# Patient Record
Sex: FEMALE | Race: WHITE | NOT HISPANIC OR LATINO | Employment: OTHER | URBAN - METROPOLITAN AREA
[De-identification: names, ages, dates, MRNs, and addresses within clinical notes are randomized per-mention and may not be internally consistent; named-entity substitution may affect disease eponyms.]

---

## 2017-08-17 ENCOUNTER — HOSPITAL ENCOUNTER (OUTPATIENT)
Dept: ORTHOPEDIC SURGERY | Facility: CLINIC | Age: 62
Discharge: HOME | End: 2017-08-17
Attending: ORTHOPAEDIC SURGERY | Admitting: ORTHOPAEDIC SURGERY

## 2017-08-23 ENCOUNTER — HOSPITAL ENCOUNTER (OUTPATIENT)
Dept: PHYSICAL THERAPY | Facility: HOSPITAL | Age: 62
Setting detail: RECURRING SERIES
Discharge: HOME | End: 2017-10-18
Attending: ORTHOPAEDIC SURGERY | Admitting: ORTHOPAEDIC SURGERY

## 2017-10-31 ENCOUNTER — HOSPITAL ENCOUNTER (OUTPATIENT)
Dept: PREADMISSION TESTING | Facility: HOSPITAL | Age: 62
Discharge: HOME | End: 2017-10-31
Attending: ORTHOPAEDIC SURGERY | Admitting: ORTHOPAEDIC SURGERY

## 2017-10-31 LAB
ABS VARIANT LYMPHS: 0.36 10*3/UL
ANION GAP SERPL CALC-SCNC: 13.2 MMOL/L (ref 10–20)
APTT BLD: 25.6 SEC (ref 24–31)
BACTERIA SPEC AEROBE CULT: NORMAL
BUN SERPL-MCNC: 15 MG/DL (ref 8–20)
BUN/CREAT SERPL: 13.6 (ref 5.4–26.2)
CALCIUM SERPL-MCNC: 9.1 MG/DL (ref 8.9–10.3)
CHLORIDE SERPL-SCNC: 93 MMOL/L (ref 101–111)
CONV ABS IMM GRAN: 0.12 10*3/UL
CONV ANISOCYTES: (no result)
CONV CO2: 31 MMOL/L (ref 22–32)
CONV MICROCYTES IN BLOOD BY LIGHT MICROSCOPY: (no result)
CONV PLATELETS GIANT IN BLOOD BY LIGHT MICROSCOPY: (no result)
CONV POIKILOCYTOSIS IN BLOOD BY LIGHT MICROSCOPY: SLIGHT
CONV POLYCHROMASIA IN BLOOD BY LIGHT MICROSCOPY: SLIGHT
CONV VARIANT LYMPHOCYTES RELATIVE PERCENT BY MANUAL COUNT: 3 % (ref 0–1)
CREAT UR-MCNC: 1.1 MG/DL (ref 0.4–1)
DIFFERENTIAL METHOD BLD: (no result)
ERYTHROCYTE [DISTWIDTH] IN BLOOD BY AUTOMATED COUNT: 20.5 % (ref 11.5–14.5)
GLUCOSE SERPL-MCNC: 76 MG/DL (ref 65–99)
HCT VFR BLD AUTO: 26.1 % (ref 35–49)
HGB BLD-MCNC: 7.5 G/DL (ref 12–15)
INR PPP: 1
LYMPHOCYTES # BLD AUTO: 2.3 10*3/UL (ref 0.8–4.8)
LYMPHOCYTES NFR BLD AUTO: 19 % (ref 18–42)
Lab: NORMAL
MCH RBC QN AUTO: 17.2 PG (ref 26–32)
MCHC RBC AUTO-ENTMCNC: (no result) G/DL (ref 32–36)
MCV RBC AUTO: (no result) FL (ref 80–94)
MICRO REPORT STATUS: NORMAL
MONOCYTES # BLD AUTO: 0.6 10*3/UL (ref 0.1–1.3)
MONOCYTES NFR BLD AUTO: 5 % (ref 2–11)
MYELOCYTES NFR BLD MANUAL: 1 %
NEUTROPHILS # BLD AUTO: 8.5 10*3/UL (ref 2.3–8.6)
NEUTROPHILS NFR BLD AUTO: 72 % (ref 50–75)
NRBC BLD AUTO-RTO: 1 /100{WBCS}
PATHOLOGIST REVIEW: (no result)
PLATELET # BLD AUTO: 340 10*3/UL (ref 150–450)
PMV BLD AUTO: 8.5 FL (ref 7.4–10.4)
POTASSIUM SERPL-SCNC: 3.2 MMOL/L (ref 3.6–5.1)
PROTHROMBIN TIME: 10.7 SEC (ref 9.6–11.7)
RBC # BLD AUTO: 4.36 10*6/UL (ref 4–5.4)
SODIUM SERPL-SCNC: 134 MMOL/L (ref 136–144)
SPECIMEN SOURCE: NORMAL
WBC # BLD AUTO: 11.9 10*3/UL (ref 4.5–11.5)

## 2017-11-07 ENCOUNTER — HOSPITAL ENCOUNTER (OUTPATIENT)
Dept: PREOP | Facility: HOSPITAL | Age: 62
Setting detail: HOSPITAL OUTPATIENT SURGERY
Discharge: HOME | End: 2017-11-07
Attending: ORTHOPAEDIC SURGERY | Admitting: ORTHOPAEDIC SURGERY

## 2017-11-07 LAB
ABO + RH BLD: NORMAL
ARMBAND: NORMAL
BLD COMPONENT TYPE: NORMAL
BLD GP AB SCN SERPL QL: NEGATIVE
CROSSMATCH EXPIRATION: NORMAL
HCT VFR BLD AUTO: 25.6 % (ref 35–49)
HGB BLD-MCNC: 7.4 G/DL (ref 12–15)
POTASSIUM SERPL-SCNC: 3.3 MMOL/L (ref 3.6–5.1)

## 2018-01-09 ENCOUNTER — OFFICE (AMBULATORY)
Dept: URBAN - METROPOLITAN AREA CLINIC 64 | Facility: CLINIC | Age: 63
End: 2018-01-09
Payer: MEDICAID

## 2018-01-09 VITALS
HEIGHT: 65 IN | HEART RATE: 108 BPM | DIASTOLIC BLOOD PRESSURE: 75 MMHG | SYSTOLIC BLOOD PRESSURE: 139 MMHG | WEIGHT: 192 LBS

## 2018-01-09 DIAGNOSIS — T40.2X5A ADVERSE EFFECT OF OTHER OPIOIDS, INITIAL ENCOUNTER: ICD-10-CM

## 2018-01-09 DIAGNOSIS — K62.5 HEMORRHAGE OF ANUS AND RECTUM: ICD-10-CM

## 2018-01-09 DIAGNOSIS — D50.0 IRON DEFICIENCY ANEMIA SECONDARY TO BLOOD LOSS (CHRONIC): ICD-10-CM

## 2018-01-09 DIAGNOSIS — K25.9 GASTRIC ULCER, UNSPECIFIED AS ACUTE OR CHRONIC, WITHOUT HEMO: ICD-10-CM

## 2018-01-09 LAB
CBC WITH DIFFERENTIAL/PLATELET: BASO (ABSOLUTE): 0 X10E3/UL (ref 0–0.2)
CBC WITH DIFFERENTIAL/PLATELET: BASOS: 0 %
CBC WITH DIFFERENTIAL/PLATELET: EOS (ABSOLUTE): 0.3 X10E3/UL (ref 0–0.4)
CBC WITH DIFFERENTIAL/PLATELET: EOS: 2 %
CBC WITH DIFFERENTIAL/PLATELET: HEMATOCRIT: 28.2 % — LOW (ref 34–46.6)
CBC WITH DIFFERENTIAL/PLATELET: HEMATOLOGY COMMENTS: (no result)
CBC WITH DIFFERENTIAL/PLATELET: HEMOGLOBIN: 8.6 G/DL — LOW (ref 11.1–15.9)
CBC WITH DIFFERENTIAL/PLATELET: IMMATURE CELLS: (no result)
CBC WITH DIFFERENTIAL/PLATELET: IMMATURE GRANS (ABS): 0 X10E3/UL (ref 0–0.1)
CBC WITH DIFFERENTIAL/PLATELET: IMMATURE GRANULOCYTES: 0 %
CBC WITH DIFFERENTIAL/PLATELET: LYMPHS (ABSOLUTE): 3 X10E3/UL (ref 0.7–3.1)
CBC WITH DIFFERENTIAL/PLATELET: LYMPHS: 18 %
CBC WITH DIFFERENTIAL/PLATELET: MCH: 19.5 PG — LOW (ref 26.6–33)
CBC WITH DIFFERENTIAL/PLATELET: MCHC: 30.5 G/DL — LOW (ref 31.5–35.7)
CBC WITH DIFFERENTIAL/PLATELET: MCV: 64 FL — LOW (ref 79–97)
CBC WITH DIFFERENTIAL/PLATELET: MONOCYTES(ABSOLUTE): 0.9 X10E3/UL (ref 0.1–0.9)
CBC WITH DIFFERENTIAL/PLATELET: MONOCYTES: 5 %
CBC WITH DIFFERENTIAL/PLATELET: NEUTROPHILS (ABSOLUTE): 12 X10E3/UL — HIGH (ref 1.4–7)
CBC WITH DIFFERENTIAL/PLATELET: NEUTROPHILS: 75 %
CBC WITH DIFFERENTIAL/PLATELET: NRBC: (no result)
CBC WITH DIFFERENTIAL/PLATELET: PLATELETS: 335 X10E3/UL (ref 150–379)
CBC WITH DIFFERENTIAL/PLATELET: RBC: 4.42 X10E6/UL (ref 3.77–5.28)
CBC WITH DIFFERENTIAL/PLATELET: RDW: 20.7 % — HIGH (ref 12.3–15.4)
CBC WITH DIFFERENTIAL/PLATELET: WBC: 16.2 X10E3/UL — HIGH (ref 3.4–10.8)
FERRITIN, SERUM: 8 NG/ML — LOW (ref 15–150)
IRON AND TIBC: IRON BIND.CAP.(TIBC): 493 UG/DL — HIGH (ref 250–450)
IRON AND TIBC: IRON SATURATION: 4 % — CRITICAL LOW (ref 15–55)
IRON AND TIBC: IRON, SERUM: 18 UG/DL — LOW (ref 27–159)
IRON AND TIBC: UIBC: 475 UG/DL — HIGH (ref 131–425)

## 2018-01-09 PROCEDURE — 99203 OFFICE O/P NEW LOW 30 MIN: CPT | Performed by: INTERNAL MEDICINE

## 2018-01-10 ENCOUNTER — HOSPITAL ENCOUNTER (OUTPATIENT)
Dept: OTHER | Facility: HOSPITAL | Age: 63
Setting detail: SPECIMEN
Discharge: HOME | End: 2018-01-10
Attending: INTERNAL MEDICINE | Admitting: INTERNAL MEDICINE

## 2018-01-10 ENCOUNTER — ON CAMPUS - OUTPATIENT (AMBULATORY)
Dept: URBAN - METROPOLITAN AREA HOSPITAL 2 | Facility: HOSPITAL | Age: 63
End: 2018-01-10
Payer: MEDICAID

## 2018-01-10 ENCOUNTER — OFFICE (AMBULATORY)
Dept: URBAN - METROPOLITAN AREA PATHOLOGY 4 | Facility: PATHOLOGY | Age: 63
End: 2018-01-10
Payer: MEDICAID

## 2018-01-10 VITALS
HEART RATE: 76 BPM | RESPIRATION RATE: 15 BRPM | DIASTOLIC BLOOD PRESSURE: 54 MMHG | DIASTOLIC BLOOD PRESSURE: 65 MMHG | WEIGHT: 184 LBS | HEART RATE: 81 BPM | DIASTOLIC BLOOD PRESSURE: 67 MMHG | HEART RATE: 74 BPM | HEART RATE: 85 BPM | HEART RATE: 83 BPM | OXYGEN SATURATION: 98 % | DIASTOLIC BLOOD PRESSURE: 74 MMHG | SYSTOLIC BLOOD PRESSURE: 103 MMHG | HEART RATE: 73 BPM | SYSTOLIC BLOOD PRESSURE: 125 MMHG | SYSTOLIC BLOOD PRESSURE: 96 MMHG | OXYGEN SATURATION: 100 % | DIASTOLIC BLOOD PRESSURE: 64 MMHG | HEART RATE: 105 BPM | SYSTOLIC BLOOD PRESSURE: 78 MMHG | DIASTOLIC BLOOD PRESSURE: 60 MMHG | HEART RATE: 75 BPM | SYSTOLIC BLOOD PRESSURE: 115 MMHG | SYSTOLIC BLOOD PRESSURE: 87 MMHG | DIASTOLIC BLOOD PRESSURE: 111 MMHG | TEMPERATURE: 97.7 F | DIASTOLIC BLOOD PRESSURE: 55 MMHG | SYSTOLIC BLOOD PRESSURE: 132 MMHG | RESPIRATION RATE: 18 BRPM | OXYGEN SATURATION: 95 % | OXYGEN SATURATION: 99 % | HEIGHT: 65 IN | HEART RATE: 71 BPM | RESPIRATION RATE: 16 BRPM | HEART RATE: 77 BPM | SYSTOLIC BLOOD PRESSURE: 98 MMHG | DIASTOLIC BLOOD PRESSURE: 53 MMHG | OXYGEN SATURATION: 97 % | SYSTOLIC BLOOD PRESSURE: 95 MMHG | DIASTOLIC BLOOD PRESSURE: 62 MMHG | DIASTOLIC BLOOD PRESSURE: 52 MMHG | OXYGEN SATURATION: 94 % | SYSTOLIC BLOOD PRESSURE: 107 MMHG

## 2018-01-10 DIAGNOSIS — K62.5 HEMORRHAGE OF ANUS AND RECTUM: ICD-10-CM

## 2018-01-10 DIAGNOSIS — K25.9 GASTRIC ULCER, UNSPECIFIED AS ACUTE OR CHRONIC, WITHOUT HEMO: ICD-10-CM

## 2018-01-10 DIAGNOSIS — K62.1 RECTAL POLYP: ICD-10-CM

## 2018-01-10 DIAGNOSIS — K31.819 ANGIODYSPLASIA OF STOMACH AND DUODENUM WITHOUT BLEEDING: ICD-10-CM

## 2018-01-10 DIAGNOSIS — K31.89 OTHER DISEASES OF STOMACH AND DUODENUM: ICD-10-CM

## 2018-01-10 DIAGNOSIS — K29.70 GASTRITIS, UNSPECIFIED, WITHOUT BLEEDING: ICD-10-CM

## 2018-01-10 DIAGNOSIS — D50.0 IRON DEFICIENCY ANEMIA SECONDARY TO BLOOD LOSS (CHRONIC): ICD-10-CM

## 2018-01-10 PROBLEM — Z48.815 ENCNTR FOR SURGICAL AFTCR FOLLOWING SURGERY ON THE DGSTV SYS: Status: ACTIVE | Noted: 2018-01-10

## 2018-01-10 LAB
GI HISTOLOGY: A. SELECT: (no result)
GI HISTOLOGY: B. UNSPECIFIED: (no result)
GI HISTOLOGY: PDF REPORT: (no result)

## 2018-01-10 PROCEDURE — 45380 COLONOSCOPY AND BIOPSY: CPT | Performed by: INTERNAL MEDICINE

## 2018-01-10 PROCEDURE — 43239 EGD BIOPSY SINGLE/MULTIPLE: CPT | Mod: 59 | Performed by: INTERNAL MEDICINE

## 2018-01-10 PROCEDURE — 88305 TISSUE EXAM BY PATHOLOGIST: CPT | Mod: 26 | Performed by: INTERNAL MEDICINE

## 2018-01-10 PROCEDURE — 43270 EGD LESION ABLATION: CPT | Performed by: INTERNAL MEDICINE

## 2018-01-10 RX ADMIN — PROPOFOL: 10 INJECTION, EMULSION INTRAVENOUS at 15:03

## 2018-01-24 ENCOUNTER — OFFICE (AMBULATORY)
Dept: URBAN - METROPOLITAN AREA INFUSION 3 | Facility: INFUSION | Age: 63
End: 2018-01-24
Payer: MEDICAID

## 2018-01-24 VITALS
RESPIRATION RATE: 18 BRPM | HEART RATE: 77 BPM | HEIGHT: 65 IN | SYSTOLIC BLOOD PRESSURE: 136 MMHG | TEMPERATURE: 96.9 F | HEART RATE: 76 BPM | TEMPERATURE: 96.8 F | SYSTOLIC BLOOD PRESSURE: 110 MMHG | DIASTOLIC BLOOD PRESSURE: 74 MMHG | DIASTOLIC BLOOD PRESSURE: 70 MMHG

## 2018-01-24 DIAGNOSIS — D50.0 IRON DEFICIENCY ANEMIA SECONDARY TO BLOOD LOSS (CHRONIC): ICD-10-CM

## 2018-01-24 DIAGNOSIS — K90.9 INTESTINAL MALABSORPTION, UNSPECIFIED: ICD-10-CM

## 2018-01-24 PROCEDURE — 96365 THER/PROPH/DIAG IV INF INIT: CPT | Performed by: INTERNAL MEDICINE

## 2018-01-31 ENCOUNTER — OFFICE (AMBULATORY)
Dept: URBAN - METROPOLITAN AREA INFUSION 3 | Facility: INFUSION | Age: 63
End: 2018-01-31
Payer: MEDICAID

## 2018-01-31 VITALS
TEMPERATURE: 97.6 F | TEMPERATURE: 96.9 F | DIASTOLIC BLOOD PRESSURE: 69 MMHG | HEIGHT: 65 IN | HEART RATE: 82 BPM | HEART RATE: 85 BPM | RESPIRATION RATE: 18 BRPM | DIASTOLIC BLOOD PRESSURE: 51 MMHG | SYSTOLIC BLOOD PRESSURE: 96 MMHG | SYSTOLIC BLOOD PRESSURE: 102 MMHG

## 2018-01-31 DIAGNOSIS — D50.0 IRON DEFICIENCY ANEMIA SECONDARY TO BLOOD LOSS (CHRONIC): ICD-10-CM

## 2018-01-31 DIAGNOSIS — K90.9 INTESTINAL MALABSORPTION, UNSPECIFIED: ICD-10-CM

## 2018-01-31 PROCEDURE — 96365 THER/PROPH/DIAG IV INF INIT: CPT | Performed by: INTERNAL MEDICINE

## 2018-10-02 ENCOUNTER — OFFICE (AMBULATORY)
Dept: URBAN - METROPOLITAN AREA CLINIC 64 | Facility: CLINIC | Age: 63
End: 2018-10-02

## 2018-10-02 VITALS
WEIGHT: 188 LBS | HEART RATE: 90 BPM | HEIGHT: 65 IN | DIASTOLIC BLOOD PRESSURE: 91 MMHG | SYSTOLIC BLOOD PRESSURE: 156 MMHG

## 2018-10-02 DIAGNOSIS — D50.9 IRON DEFICIENCY ANEMIA, UNSPECIFIED: ICD-10-CM

## 2018-10-02 PROCEDURE — 99213 OFFICE O/P EST LOW 20 MIN: CPT | Performed by: NURSE PRACTITIONER

## 2018-10-23 ENCOUNTER — OFFICE (AMBULATORY)
Dept: URBAN - METROPOLITAN AREA CLINIC 64 | Facility: CLINIC | Age: 63
End: 2018-10-23

## 2018-10-23 VITALS
DIASTOLIC BLOOD PRESSURE: 66 MMHG | WEIGHT: 188 LBS | HEART RATE: 88 BPM | SYSTOLIC BLOOD PRESSURE: 118 MMHG | HEIGHT: 65 IN

## 2018-10-23 DIAGNOSIS — D50.9 IRON DEFICIENCY ANEMIA, UNSPECIFIED: ICD-10-CM

## 2018-10-23 PROCEDURE — 99213 OFFICE O/P EST LOW 20 MIN: CPT | Performed by: NURSE PRACTITIONER

## 2018-10-29 ENCOUNTER — OFFICE (AMBULATORY)
Dept: URBAN - METROPOLITAN AREA CLINIC 64 | Facility: CLINIC | Age: 63
End: 2018-10-29

## 2018-10-29 DIAGNOSIS — D50.0 IRON DEFICIENCY ANEMIA SECONDARY TO BLOOD LOSS (CHRONIC): ICD-10-CM

## 2018-10-29 DIAGNOSIS — K55.20 ANGIODYSPLASIA OF COLON WITHOUT HEMORRHAGE: ICD-10-CM

## 2018-10-29 DIAGNOSIS — Z98.890 OTHER SPECIFIED POSTPROCEDURAL STATES: ICD-10-CM

## 2018-10-29 PROCEDURE — 91110 GI TRC IMG INTRAL ESOPH-ILE: CPT | Performed by: INTERNAL MEDICINE

## 2018-12-07 ENCOUNTER — OFFICE (AMBULATORY)
Dept: URBAN - METROPOLITAN AREA CLINIC 64 | Facility: CLINIC | Age: 63
End: 2018-12-07

## 2018-12-07 VITALS
HEIGHT: 65 IN | WEIGHT: 187 LBS | SYSTOLIC BLOOD PRESSURE: 110 MMHG | HEART RATE: 88 BPM | DIASTOLIC BLOOD PRESSURE: 66 MMHG

## 2018-12-07 DIAGNOSIS — D50.0 IRON DEFICIENCY ANEMIA SECONDARY TO BLOOD LOSS (CHRONIC): ICD-10-CM

## 2018-12-07 DIAGNOSIS — K92.1 MELENA: ICD-10-CM

## 2018-12-07 PROCEDURE — 99213 OFFICE O/P EST LOW 20 MIN: CPT | Performed by: NURSE PRACTITIONER

## 2018-12-12 ENCOUNTER — ON CAMPUS - OUTPATIENT (AMBULATORY)
Dept: URBAN - METROPOLITAN AREA HOSPITAL 2 | Facility: HOSPITAL | Age: 63
End: 2018-12-12

## 2018-12-12 VITALS
OXYGEN SATURATION: 96 % | SYSTOLIC BLOOD PRESSURE: 121 MMHG | WEIGHT: 184 LBS | DIASTOLIC BLOOD PRESSURE: 59 MMHG | OXYGEN SATURATION: 100 % | HEIGHT: 65 IN | HEART RATE: 105 BPM | SYSTOLIC BLOOD PRESSURE: 113 MMHG | HEART RATE: 81 BPM | SYSTOLIC BLOOD PRESSURE: 97 MMHG | SYSTOLIC BLOOD PRESSURE: 165 MMHG | OXYGEN SATURATION: 99 % | OXYGEN SATURATION: 98 % | HEART RATE: 85 BPM | SYSTOLIC BLOOD PRESSURE: 133 MMHG | DIASTOLIC BLOOD PRESSURE: 87 MMHG | HEART RATE: 80 BPM | HEART RATE: 84 BPM | TEMPERATURE: 98 F | DIASTOLIC BLOOD PRESSURE: 61 MMHG | RESPIRATION RATE: 18 BRPM | SYSTOLIC BLOOD PRESSURE: 122 MMHG | OXYGEN SATURATION: 97 % | DIASTOLIC BLOOD PRESSURE: 65 MMHG | DIASTOLIC BLOOD PRESSURE: 53 MMHG | OXYGEN SATURATION: 95 % | DIASTOLIC BLOOD PRESSURE: 63 MMHG | SYSTOLIC BLOOD PRESSURE: 102 MMHG | DIASTOLIC BLOOD PRESSURE: 56 MMHG | RESPIRATION RATE: 12 BRPM | RESPIRATION RATE: 16 BRPM | HEART RATE: 86 BPM | DIASTOLIC BLOOD PRESSURE: 74 MMHG | SYSTOLIC BLOOD PRESSURE: 120 MMHG

## 2018-12-12 DIAGNOSIS — K31.819 ANGIODYSPLASIA OF STOMACH AND DUODENUM WITHOUT BLEEDING: ICD-10-CM

## 2018-12-12 DIAGNOSIS — D50.0 IRON DEFICIENCY ANEMIA SECONDARY TO BLOOD LOSS (CHRONIC): ICD-10-CM

## 2018-12-12 PROCEDURE — 44369 SMALL BOWEL ENDOSCOPY: CPT | Performed by: INTERNAL MEDICINE

## 2018-12-12 RX ADMIN — GLUCAGON 0.5 MG: KIT at 16:17

## 2019-01-01 ENCOUNTER — OFFICE VISIT (OUTPATIENT)
Dept: SURGERY | Facility: CLINIC | Age: 64
End: 2019-01-01

## 2019-01-01 ENCOUNTER — OFFICE VISIT (OUTPATIENT)
Dept: FAMILY MEDICINE CLINIC | Facility: CLINIC | Age: 64
End: 2019-01-01

## 2019-01-01 ENCOUNTER — TELEPHONE (OUTPATIENT)
Dept: FAMILY MEDICINE CLINIC | Facility: CLINIC | Age: 64
End: 2019-01-01

## 2019-01-01 ENCOUNTER — HOSPITAL ENCOUNTER (OUTPATIENT)
Dept: RADIATION ONCOLOGY | Facility: HOSPITAL | Age: 64
Discharge: HOME | End: 2019-10-25

## 2019-01-01 ENCOUNTER — HOSPITAL ENCOUNTER (OUTPATIENT)
Dept: CT IMAGING | Facility: HOSPITAL | Age: 64
Discharge: HOME | End: 2019-08-26
Admitting: ORTHOPAEDIC SURGERY

## 2019-01-01 ENCOUNTER — EPISODE CHANGES (OUTPATIENT)
Dept: CASE MANAGEMENT | Facility: OTHER | Age: 64
End: 2019-01-01

## 2019-01-01 ENCOUNTER — LAB REQUISITION (OUTPATIENT)
Dept: LAB | Facility: HOSPITAL | Age: 64
End: 2019-01-01

## 2019-01-01 ENCOUNTER — HOSPITAL ENCOUNTER (OUTPATIENT)
Dept: RADIATION ONCOLOGY | Facility: HOSPITAL | Age: 64
Discharge: HOME | End: 2019-10-22

## 2019-01-01 ENCOUNTER — TELEPHONE (OUTPATIENT)
Dept: ONCOLOGY | Facility: CLINIC | Age: 64
End: 2019-01-01

## 2019-01-01 ENCOUNTER — HOSPITAL ENCOUNTER (OUTPATIENT)
Dept: NUCLEAR MEDICINE | Facility: HOSPITAL | Age: 64
Discharge: HOME | End: 2019-09-18

## 2019-01-01 ENCOUNTER — PREP FOR SURGERY (OUTPATIENT)
Dept: OTHER | Facility: HOSPITAL | Age: 64
End: 2019-01-01

## 2019-01-01 ENCOUNTER — HOSPITAL ENCOUNTER (OUTPATIENT)
Dept: RADIATION ONCOLOGY | Facility: HOSPITAL | Age: 64
Discharge: HOME | End: 2019-10-28

## 2019-01-01 ENCOUNTER — OFFICE VISIT (OUTPATIENT)
Dept: CARDIOLOGY | Facility: CLINIC | Age: 64
End: 2019-01-01

## 2019-01-01 ENCOUNTER — HOSPITAL ENCOUNTER (OUTPATIENT)
Dept: RADIATION ONCOLOGY | Facility: HOSPITAL | Age: 64
Discharge: HOME | End: 2019-10-23

## 2019-01-01 ENCOUNTER — READMISSION MANAGEMENT (OUTPATIENT)
Dept: CALL CENTER | Facility: HOSPITAL | Age: 64
End: 2019-01-01

## 2019-01-01 ENCOUNTER — TELEPHONE (OUTPATIENT)
Dept: ORTHOPEDIC SURGERY | Facility: CLINIC | Age: 64
End: 2019-01-01

## 2019-01-01 ENCOUNTER — HOSPITAL ENCOUNTER (OUTPATIENT)
Dept: RADIATION ONCOLOGY | Facility: HOSPITAL | Age: 64
Discharge: HOME | End: 2019-12-24

## 2019-01-01 ENCOUNTER — OFFICE VISIT (OUTPATIENT)
Dept: ORTHOPEDIC SURGERY | Facility: CLINIC | Age: 64
End: 2019-01-01

## 2019-01-01 ENCOUNTER — HOSPITAL ENCOUNTER (OUTPATIENT)
Dept: INFUSION THERAPY | Facility: HOSPITAL | Age: 64
Discharge: HOME | End: 2019-06-25
Admitting: NURSE PRACTITIONER

## 2019-01-01 ENCOUNTER — HOSPITAL ENCOUNTER (OUTPATIENT)
Dept: CARDIOLOGY | Facility: HOSPITAL | Age: 64
Discharge: HOME | End: 2019-09-23
Admitting: INTERNAL MEDICINE

## 2019-01-01 ENCOUNTER — HOSPITAL ENCOUNTER (OUTPATIENT)
Dept: INFUSION THERAPY | Facility: HOSPITAL | Age: 64
Discharge: HOME | End: 2019-07-02
Admitting: NURSE PRACTITIONER

## 2019-01-01 ENCOUNTER — ANESTHESIA (OUTPATIENT)
Dept: PERIOP | Facility: HOSPITAL | Age: 64
End: 2019-01-01

## 2019-01-01 ENCOUNTER — HOSPITAL ENCOUNTER (OUTPATIENT)
Dept: RADIATION ONCOLOGY | Facility: HOSPITAL | Age: 64
Discharge: HOME | End: 2019-12-27

## 2019-01-01 ENCOUNTER — HOSPITAL ENCOUNTER (OUTPATIENT)
Dept: RADIATION ONCOLOGY | Facility: HOSPITAL | Age: 64
Setting detail: RADIATION/ONCOLOGY SERIES
End: 2019-01-01

## 2019-01-01 ENCOUNTER — TELEPHONE (OUTPATIENT)
Dept: CARDIOLOGY | Facility: CLINIC | Age: 64
End: 2019-01-01

## 2019-01-01 ENCOUNTER — HOSPITAL ENCOUNTER (OUTPATIENT)
Dept: CARDIOLOGY | Facility: HOSPITAL | Age: 64
Discharge: HOME | End: 2019-12-17
Admitting: NURSE PRACTITIONER

## 2019-01-01 ENCOUNTER — TELEPHONE (OUTPATIENT)
Dept: PET IMAGING | Facility: HOSPITAL | Age: 64
End: 2019-01-01

## 2019-01-01 ENCOUNTER — TRANSCRIBE ORDERS (OUTPATIENT)
Dept: ADMINISTRATIVE | Facility: HOSPITAL | Age: 64
End: 2019-01-01

## 2019-01-01 ENCOUNTER — TRANSITIONAL CARE MANAGEMENT TELEPHONE ENCOUNTER (OUTPATIENT)
Dept: FAMILY MEDICINE CLINIC | Facility: CLINIC | Age: 64
End: 2019-01-01

## 2019-01-01 ENCOUNTER — TELEPHONE (OUTPATIENT)
Dept: RADIATION ONCOLOGY | Facility: HOSPITAL | Age: 64
End: 2019-01-01

## 2019-01-01 ENCOUNTER — ANESTHESIA (OUTPATIENT)
Dept: GASTROENTEROLOGY | Facility: HOSPITAL | Age: 64
End: 2019-01-01

## 2019-01-01 ENCOUNTER — HOSPITAL ENCOUNTER (OUTPATIENT)
Dept: INFUSION THERAPY | Facility: HOSPITAL | Age: 64
Discharge: HOME | End: 2019-06-28
Admitting: FAMILY MEDICINE

## 2019-01-01 ENCOUNTER — HOSPITAL ENCOUNTER (OUTPATIENT)
Dept: GENERAL RADIOLOGY | Facility: HOSPITAL | Age: 64
Discharge: HOME | End: 2019-12-02

## 2019-01-01 ENCOUNTER — HOSPITAL ENCOUNTER (OUTPATIENT)
Facility: HOSPITAL | Age: 64
Setting detail: HOSPITAL OUTPATIENT SURGERY
Discharge: HOME | End: 2019-10-04
Attending: THORACIC SURGERY (CARDIOTHORACIC VASCULAR SURGERY) | Admitting: THORACIC SURGERY (CARDIOTHORACIC VASCULAR SURGERY)

## 2019-01-01 ENCOUNTER — HOSPITAL ENCOUNTER (OUTPATIENT)
Dept: RADIATION ONCOLOGY | Facility: HOSPITAL | Age: 64
Discharge: HOME | End: 2019-12-23

## 2019-01-01 ENCOUNTER — RESULTS ENCOUNTER (OUTPATIENT)
Dept: FAMILY MEDICINE CLINIC | Facility: CLINIC | Age: 64
End: 2019-01-01

## 2019-01-01 ENCOUNTER — HOSPITAL ENCOUNTER (OUTPATIENT)
Dept: MRI IMAGING | Facility: HOSPITAL | Age: 64
Discharge: HOME | End: 2019-08-19
Admitting: FAMILY MEDICINE

## 2019-01-01 ENCOUNTER — OFFICE VISIT (OUTPATIENT)
Dept: RADIATION ONCOLOGY | Facility: HOSPITAL | Age: 64
End: 2019-01-01

## 2019-01-01 ENCOUNTER — HOSPITAL ENCOUNTER (OUTPATIENT)
Dept: RADIATION ONCOLOGY | Facility: HOSPITAL | Age: 64
Discharge: HOME | End: 2019-12-20

## 2019-01-01 ENCOUNTER — HOSPITAL ENCOUNTER (OUTPATIENT)
Dept: RADIATION ONCOLOGY | Facility: HOSPITAL | Age: 64
Discharge: HOME | End: 2019-10-24

## 2019-01-01 ENCOUNTER — APPOINTMENT (OUTPATIENT)
Dept: LAB | Facility: HOSPITAL | Age: 64
End: 2019-01-01

## 2019-01-01 ENCOUNTER — HOSPITAL ENCOUNTER (OUTPATIENT)
Facility: HOSPITAL | Age: 64
Setting detail: HOSPITAL OUTPATIENT SURGERY
Discharge: HOME | End: 2019-12-02
Attending: THORACIC SURGERY (CARDIOTHORACIC VASCULAR SURGERY) | Admitting: THORACIC SURGERY (CARDIOTHORACIC VASCULAR SURGERY)

## 2019-01-01 ENCOUNTER — HOSPITAL ENCOUNTER (OUTPATIENT)
Dept: RADIATION ONCOLOGY | Facility: HOSPITAL | Age: 64
Discharge: HOME | End: 2019-11-01

## 2019-01-01 ENCOUNTER — PATIENT OUTREACH (OUTPATIENT)
Dept: CASE MANAGEMENT | Facility: OTHER | Age: 64
End: 2019-01-01

## 2019-01-01 ENCOUNTER — CONSULT (OUTPATIENT)
Dept: ONCOLOGY | Facility: CLINIC | Age: 64
End: 2019-01-01

## 2019-01-01 ENCOUNTER — APPOINTMENT (OUTPATIENT)
Dept: PREADMISSION TESTING | Facility: HOSPITAL | Age: 64
End: 2019-01-01

## 2019-01-01 ENCOUNTER — HOSPITAL ENCOUNTER (OUTPATIENT)
Dept: RADIATION ONCOLOGY | Facility: HOSPITAL | Age: 64
Discharge: HOME | End: 2019-10-21

## 2019-01-01 ENCOUNTER — HOSPITAL ENCOUNTER (OUTPATIENT)
Dept: RADIATION ONCOLOGY | Facility: HOSPITAL | Age: 64
Discharge: HOME | End: 2019-10-29

## 2019-01-01 ENCOUNTER — HOSPITAL ENCOUNTER (OUTPATIENT)
Dept: RADIATION ONCOLOGY | Facility: HOSPITAL | Age: 64
Discharge: HOME | End: 2019-12-26

## 2019-01-01 ENCOUNTER — HOSPITAL ENCOUNTER (OUTPATIENT)
Dept: RADIATION ONCOLOGY | Facility: HOSPITAL | Age: 64
Discharge: HOME | End: 2019-10-31

## 2019-01-01 ENCOUNTER — HOSPITAL ENCOUNTER (OUTPATIENT)
Dept: NUCLEAR MEDICINE | Facility: HOSPITAL | Age: 64
Discharge: HOME | End: 2019-09-18
Admitting: INTERNAL MEDICINE

## 2019-01-01 ENCOUNTER — CONSULT (OUTPATIENT)
Dept: RADIATION ONCOLOGY | Facility: HOSPITAL | Age: 64
End: 2019-01-01

## 2019-01-01 ENCOUNTER — APPOINTMENT (OUTPATIENT)
Dept: GENERAL RADIOLOGY | Facility: HOSPITAL | Age: 64
End: 2019-01-01

## 2019-01-01 ENCOUNTER — HOSPITAL ENCOUNTER (OUTPATIENT)
Dept: RADIATION ONCOLOGY | Facility: HOSPITAL | Age: 64
Discharge: HOME | End: 2019-10-30

## 2019-01-01 ENCOUNTER — TELEPHONE (OUTPATIENT)
Dept: ONCOLOGY | Facility: HOSPITAL | Age: 64
End: 2019-01-01

## 2019-01-01 ENCOUNTER — HOSPITAL ENCOUNTER (OUTPATIENT)
Dept: CT IMAGING | Facility: HOSPITAL | Age: 64
Discharge: HOME | End: 2019-10-01
Admitting: FAMILY MEDICINE

## 2019-01-01 ENCOUNTER — HOSPITAL ENCOUNTER (OUTPATIENT)
Dept: PET IMAGING | Facility: HOSPITAL | Age: 64
Discharge: HOME | End: 2019-10-08
Admitting: INTERNAL MEDICINE

## 2019-01-01 ENCOUNTER — HOSPITAL ENCOUNTER (OUTPATIENT)
Dept: RADIATION ONCOLOGY | Facility: HOSPITAL | Age: 64
Discharge: HOME | End: 2019-12-30

## 2019-01-01 ENCOUNTER — ANESTHESIA EVENT (OUTPATIENT)
Dept: GASTROENTEROLOGY | Facility: HOSPITAL | Age: 64
End: 2019-01-01

## 2019-01-01 ENCOUNTER — ANESTHESIA EVENT (OUTPATIENT)
Dept: PERIOP | Facility: HOSPITAL | Age: 64
End: 2019-01-01

## 2019-01-01 ENCOUNTER — OFFICE VISIT (OUTPATIENT)
Dept: ONCOLOGY | Facility: CLINIC | Age: 64
End: 2019-01-01

## 2019-01-01 ENCOUNTER — HOSPITAL ENCOUNTER (INPATIENT)
Facility: HOSPITAL | Age: 64
LOS: 1 days | Discharge: HOME | End: 2019-09-10
Attending: INTERNAL MEDICINE | Admitting: INTERNAL MEDICINE

## 2019-01-01 VITALS
TEMPERATURE: 97.4 F | SYSTOLIC BLOOD PRESSURE: 141 MMHG | BODY MASS INDEX: 28.34 KG/M2 | HEART RATE: 105 BPM | DIASTOLIC BLOOD PRESSURE: 90 MMHG | OXYGEN SATURATION: 94 % | WEIGHT: 160 LBS

## 2019-01-01 VITALS
OXYGEN SATURATION: 96 % | HEIGHT: 63 IN | DIASTOLIC BLOOD PRESSURE: 81 MMHG | HEART RATE: 105 BPM | SYSTOLIC BLOOD PRESSURE: 150 MMHG | BODY MASS INDEX: 28.49 KG/M2 | WEIGHT: 160.8 LBS | TEMPERATURE: 98.3 F

## 2019-01-01 VITALS
WEIGHT: 180.2 LBS | WEIGHT: 182.4 LBS | HEART RATE: 104 BPM | HEIGHT: 64 IN | SYSTOLIC BLOOD PRESSURE: 108 MMHG | DIASTOLIC BLOOD PRESSURE: 72 MMHG | BODY MASS INDEX: 30.77 KG/M2 | TEMPERATURE: 98.3 F | HEART RATE: 122 BPM | SYSTOLIC BLOOD PRESSURE: 124 MMHG | OXYGEN SATURATION: 91 % | RESPIRATION RATE: 18 BRPM | DIASTOLIC BLOOD PRESSURE: 62 MMHG | RESPIRATION RATE: 18 BRPM | TEMPERATURE: 98.4 F | BODY MASS INDEX: 31.14 KG/M2 | HEIGHT: 64 IN

## 2019-01-01 VITALS
HEART RATE: 96 BPM | SYSTOLIC BLOOD PRESSURE: 113 MMHG | WEIGHT: 157 LBS | DIASTOLIC BLOOD PRESSURE: 77 MMHG | OXYGEN SATURATION: 94 % | TEMPERATURE: 97.8 F | BODY MASS INDEX: 28.72 KG/M2

## 2019-01-01 VITALS
HEART RATE: 78 BPM | DIASTOLIC BLOOD PRESSURE: 73 MMHG | RESPIRATION RATE: 16 BRPM | TEMPERATURE: 97.5 F | OXYGEN SATURATION: 93 % | SYSTOLIC BLOOD PRESSURE: 127 MMHG | BODY MASS INDEX: 28.12 KG/M2 | HEIGHT: 63 IN | WEIGHT: 158.73 LBS

## 2019-01-01 VITALS
HEIGHT: 63 IN | HEART RATE: 108 BPM | TEMPERATURE: 98.2 F | RESPIRATION RATE: 18 BRPM | WEIGHT: 165 LBS | DIASTOLIC BLOOD PRESSURE: 74 MMHG | BODY MASS INDEX: 29.23 KG/M2 | SYSTOLIC BLOOD PRESSURE: 132 MMHG | OXYGEN SATURATION: 96 %

## 2019-01-01 VITALS — WEIGHT: 171 LBS | BODY MASS INDEX: 30.3 KG/M2 | HEIGHT: 63 IN

## 2019-01-01 VITALS
SYSTOLIC BLOOD PRESSURE: 132 MMHG | TEMPERATURE: 98.5 F | HEIGHT: 63 IN | OXYGEN SATURATION: 96 % | HEART RATE: 90 BPM | DIASTOLIC BLOOD PRESSURE: 68 MMHG | WEIGHT: 171 LBS | RESPIRATION RATE: 18 BRPM | BODY MASS INDEX: 30.3 KG/M2

## 2019-01-01 VITALS
TEMPERATURE: 97.4 F | HEART RATE: 93 BPM | SYSTOLIC BLOOD PRESSURE: 122 MMHG | OXYGEN SATURATION: 96 % | DIASTOLIC BLOOD PRESSURE: 78 MMHG | BODY MASS INDEX: 28.17 KG/M2 | WEIGHT: 159 LBS

## 2019-01-01 VITALS
HEART RATE: 72 BPM | BODY MASS INDEX: 29.74 KG/M2 | TEMPERATURE: 98.4 F | RESPIRATION RATE: 18 BRPM | WEIGHT: 161.6 LBS | HEIGHT: 62 IN | OXYGEN SATURATION: 95 % | SYSTOLIC BLOOD PRESSURE: 118 MMHG | DIASTOLIC BLOOD PRESSURE: 58 MMHG

## 2019-01-01 VITALS
TEMPERATURE: 98.2 F | HEIGHT: 65 IN | BODY MASS INDEX: 29.69 KG/M2 | OXYGEN SATURATION: 95 % | HEART RATE: 94 BPM | RESPIRATION RATE: 18 BRPM | WEIGHT: 178.2 LBS

## 2019-01-01 VITALS
SYSTOLIC BLOOD PRESSURE: 112 MMHG | BODY MASS INDEX: 29.02 KG/M2 | DIASTOLIC BLOOD PRESSURE: 64 MMHG | HEIGHT: 64 IN | RESPIRATION RATE: 18 BRPM | WEIGHT: 170 LBS | HEART RATE: 96 BPM | TEMPERATURE: 97.9 F

## 2019-01-01 VITALS
TEMPERATURE: 98.3 F | HEIGHT: 63 IN | WEIGHT: 165.2 LBS | RESPIRATION RATE: 16 BRPM | HEART RATE: 98 BPM | DIASTOLIC BLOOD PRESSURE: 88 MMHG | SYSTOLIC BLOOD PRESSURE: 156 MMHG | BODY MASS INDEX: 29.27 KG/M2

## 2019-01-01 VITALS
BODY MASS INDEX: 28.31 KG/M2 | OXYGEN SATURATION: 99 % | HEART RATE: 96 BPM | WEIGHT: 159.8 LBS | TEMPERATURE: 97.6 F | HEIGHT: 63 IN | DIASTOLIC BLOOD PRESSURE: 82 MMHG | SYSTOLIC BLOOD PRESSURE: 136 MMHG

## 2019-01-01 VITALS
SYSTOLIC BLOOD PRESSURE: 146 MMHG | WEIGHT: 184 LBS | HEIGHT: 64 IN | HEART RATE: 92 BPM | DIASTOLIC BLOOD PRESSURE: 79 MMHG | TEMPERATURE: 98.6 F | BODY MASS INDEX: 31.41 KG/M2 | RESPIRATION RATE: 16 BRPM

## 2019-01-01 VITALS
DIASTOLIC BLOOD PRESSURE: 59 MMHG | HEIGHT: 64 IN | HEART RATE: 90 BPM | SYSTOLIC BLOOD PRESSURE: 116 MMHG | WEIGHT: 173 LBS | BODY MASS INDEX: 29.53 KG/M2

## 2019-01-01 VITALS
SYSTOLIC BLOOD PRESSURE: 132 MMHG | HEART RATE: 78 BPM | HEIGHT: 63 IN | OXYGEN SATURATION: 99 % | BODY MASS INDEX: 28.24 KG/M2 | DIASTOLIC BLOOD PRESSURE: 84 MMHG | WEIGHT: 159.39 LBS | RESPIRATION RATE: 14 BRPM | TEMPERATURE: 98 F

## 2019-01-01 VITALS
SYSTOLIC BLOOD PRESSURE: 115 MMHG | BODY MASS INDEX: 30.66 KG/M2 | TEMPERATURE: 98.4 F | DIASTOLIC BLOOD PRESSURE: 71 MMHG | HEIGHT: 65 IN | RESPIRATION RATE: 18 BRPM | WEIGHT: 184 LBS | OXYGEN SATURATION: 98 % | HEART RATE: 84 BPM

## 2019-01-01 VITALS
TEMPERATURE: 98 F | WEIGHT: 165 LBS | RESPIRATION RATE: 20 BRPM | SYSTOLIC BLOOD PRESSURE: 138 MMHG | HEIGHT: 63 IN | HEART RATE: 92 BPM | BODY MASS INDEX: 29.23 KG/M2 | DIASTOLIC BLOOD PRESSURE: 89 MMHG

## 2019-01-01 VITALS
HEART RATE: 111 BPM | WEIGHT: 170.8 LBS | HEIGHT: 64 IN | BODY MASS INDEX: 29.16 KG/M2 | SYSTOLIC BLOOD PRESSURE: 82 MMHG | DIASTOLIC BLOOD PRESSURE: 46 MMHG

## 2019-01-01 VITALS
OXYGEN SATURATION: 100 % | RESPIRATION RATE: 16 BRPM | HEIGHT: 63 IN | SYSTOLIC BLOOD PRESSURE: 126 MMHG | BODY MASS INDEX: 31.02 KG/M2 | TEMPERATURE: 97.2 F | HEART RATE: 84 BPM | DIASTOLIC BLOOD PRESSURE: 66 MMHG | WEIGHT: 175.04 LBS

## 2019-01-01 VITALS
SYSTOLIC BLOOD PRESSURE: 130 MMHG | WEIGHT: 175 LBS | HEIGHT: 64 IN | OXYGEN SATURATION: 96 % | RESPIRATION RATE: 18 BRPM | BODY MASS INDEX: 29.88 KG/M2 | DIASTOLIC BLOOD PRESSURE: 64 MMHG | HEART RATE: 67 BPM | TEMPERATURE: 98.3 F

## 2019-01-01 VITALS
BODY MASS INDEX: 30.39 KG/M2 | TEMPERATURE: 97.9 F | SYSTOLIC BLOOD PRESSURE: 102 MMHG | HEART RATE: 81 BPM | HEIGHT: 64 IN | RESPIRATION RATE: 15 BRPM | WEIGHT: 178 LBS | DIASTOLIC BLOOD PRESSURE: 64 MMHG | OXYGEN SATURATION: 95 %

## 2019-01-01 VITALS
WEIGHT: 170 LBS | HEART RATE: 90 BPM | BODY MASS INDEX: 30.12 KG/M2 | HEIGHT: 63 IN | DIASTOLIC BLOOD PRESSURE: 70 MMHG | SYSTOLIC BLOOD PRESSURE: 115 MMHG

## 2019-01-01 VITALS
OXYGEN SATURATION: 96 % | HEIGHT: 63 IN | SYSTOLIC BLOOD PRESSURE: 140 MMHG | DIASTOLIC BLOOD PRESSURE: 83 MMHG | WEIGHT: 159.2 LBS | BODY MASS INDEX: 28.21 KG/M2 | HEART RATE: 86 BPM

## 2019-01-01 DIAGNOSIS — C34.92 PRIMARY MALIGNANT NEOPLASM OF LEFT LUNG (HCC): Primary | ICD-10-CM

## 2019-01-01 DIAGNOSIS — M19.011 DEGENERATIVE JOINT DISEASE OF RIGHT ACROMIOCLAVICULAR JOINT: ICD-10-CM

## 2019-01-01 DIAGNOSIS — M84.40XS: ICD-10-CM

## 2019-01-01 DIAGNOSIS — M51.36 DDD (DEGENERATIVE DISC DISEASE), LUMBAR: ICD-10-CM

## 2019-01-01 DIAGNOSIS — K58.9 IRRITABLE BOWEL SYNDROME WITHOUT DIARRHEA: ICD-10-CM

## 2019-01-01 DIAGNOSIS — M43.26 FUSION OF LUMBAR SPINE: ICD-10-CM

## 2019-01-01 DIAGNOSIS — D50.0 IRON DEFICIENCY ANEMIA DUE TO CHRONIC BLOOD LOSS: ICD-10-CM

## 2019-01-01 DIAGNOSIS — I63.239: ICD-10-CM

## 2019-01-01 DIAGNOSIS — G89.29 CHRONIC LOW BACK PAIN, UNSPECIFIED BACK PAIN LATERALITY, WITH SCIATICA PRESENCE UNSPECIFIED: Primary | ICD-10-CM

## 2019-01-01 DIAGNOSIS — Z01.810 PREOPERATIVE CARDIOVASCULAR EXAMINATION: ICD-10-CM

## 2019-01-01 DIAGNOSIS — C32.9 LARYNGEAL CANCER (HCC): ICD-10-CM

## 2019-01-01 DIAGNOSIS — Z48.815 ENCOUNTER FOR SURGICAL AFTERCARE FOLLOWING SURGERY OF DIGESTIVE SYSTEM: ICD-10-CM

## 2019-01-01 DIAGNOSIS — R05.9 COUGH: ICD-10-CM

## 2019-01-01 DIAGNOSIS — R60.9 EDEMA, UNSPECIFIED TYPE: ICD-10-CM

## 2019-01-01 DIAGNOSIS — F34.89 OTHER SPECIFIED PERSISTENT MOOD DISORDERS (HCC): ICD-10-CM

## 2019-01-01 DIAGNOSIS — M50.30 OTHER CERVICAL DISC DEGENERATION, UNSPECIFIED CERVICAL REGION: ICD-10-CM

## 2019-01-01 DIAGNOSIS — I99.9 VASCULAR DISEASE: Primary | ICD-10-CM

## 2019-01-01 DIAGNOSIS — M15.9 PRIMARY OSTEOARTHRITIS INVOLVING MULTIPLE JOINTS: ICD-10-CM

## 2019-01-01 DIAGNOSIS — M48.02 SPINAL STENOSIS OF CERVICAL REGION: ICD-10-CM

## 2019-01-01 DIAGNOSIS — M54.5 CHRONIC LOW BACK PAIN, UNSPECIFIED BACK PAIN LATERALITY, WITH SCIATICA PRESENCE UNSPECIFIED: ICD-10-CM

## 2019-01-01 DIAGNOSIS — M48.062 LUMBAR STENOSIS WITH NEUROGENIC CLAUDICATION: Primary | ICD-10-CM

## 2019-01-01 DIAGNOSIS — C73 MALIGNANT NEOPLASM OF THYROID GLAND (HCC): ICD-10-CM

## 2019-01-01 DIAGNOSIS — R26.81 UNSTEADY GAIT: ICD-10-CM

## 2019-01-01 DIAGNOSIS — E78.2 MIXED HYPERLIPIDEMIA: ICD-10-CM

## 2019-01-01 DIAGNOSIS — D64.9 ANEMIA, UNSPECIFIED TYPE: Primary | ICD-10-CM

## 2019-01-01 DIAGNOSIS — R91.8 OTHER NONSPECIFIC ABNORMAL FINDING OF LUNG FIELD: ICD-10-CM

## 2019-01-01 DIAGNOSIS — J98.59 MEDIASTINAL MASS: ICD-10-CM

## 2019-01-01 DIAGNOSIS — D50.0 IRON DEFICIENCY ANEMIA DUE TO CHRONIC BLOOD LOSS: Primary | ICD-10-CM

## 2019-01-01 DIAGNOSIS — M40.30 FLAT BACK SYNDROME: ICD-10-CM

## 2019-01-01 DIAGNOSIS — R93.89 ABNORMAL CXR: ICD-10-CM

## 2019-01-01 DIAGNOSIS — F41.9 ANXIETY: ICD-10-CM

## 2019-01-01 DIAGNOSIS — M54.5 CHRONIC LOW BACK PAIN, UNSPECIFIED BACK PAIN LATERALITY, WITH SCIATICA PRESENCE UNSPECIFIED: Primary | ICD-10-CM

## 2019-01-01 DIAGNOSIS — R06.02 SHORTNESS OF BREATH: ICD-10-CM

## 2019-01-01 DIAGNOSIS — M25.50 MULTIPLE JOINT PAIN: ICD-10-CM

## 2019-01-01 DIAGNOSIS — R93.89 ABNORMAL CXR: Primary | ICD-10-CM

## 2019-01-01 DIAGNOSIS — J98.59 MEDIASTINAL MASS: Primary | ICD-10-CM

## 2019-01-01 DIAGNOSIS — R91.8 LUNG MASS: Primary | ICD-10-CM

## 2019-01-01 DIAGNOSIS — K55.20 ANGIODYSPLASIA OF COLON WITHOUT HEMORRHAGE: ICD-10-CM

## 2019-01-01 DIAGNOSIS — M81.0 OSTEOPOROSIS, UNSPECIFIED OSTEOPOROSIS TYPE, UNSPECIFIED PATHOLOGICAL FRACTURE PRESENCE: ICD-10-CM

## 2019-01-01 DIAGNOSIS — C34.90 MALIGNANT NEOPLASM OF BRONCHUS AND LUNG (HCC): Primary | ICD-10-CM

## 2019-01-01 DIAGNOSIS — Z01.810 PREOPERATIVE CARDIOVASCULAR EXAMINATION: Primary | ICD-10-CM

## 2019-01-01 DIAGNOSIS — C34.90 MALIGNANT NEOPLASM OF BRONCHUS AND LUNG (HCC): ICD-10-CM

## 2019-01-01 DIAGNOSIS — R53.1 WEAKNESS: Primary | ICD-10-CM

## 2019-01-01 DIAGNOSIS — C7A.8 LARGE CELL NEUROENDOCRINE CARCINOMA (HCC): Primary | ICD-10-CM

## 2019-01-01 DIAGNOSIS — R91.1 LUNG NODULE: ICD-10-CM

## 2019-01-01 DIAGNOSIS — R91.8 LUNG MASS: ICD-10-CM

## 2019-01-01 DIAGNOSIS — K25.7 CHRONIC GASTRIC ULCER WITHOUT HEMORRHAGE OR PERFORATION: ICD-10-CM

## 2019-01-01 DIAGNOSIS — I10 ESSENTIAL HYPERTENSION: ICD-10-CM

## 2019-01-01 DIAGNOSIS — K29.71 GASTRITIS WITH HEMORRHAGE, UNSPECIFIED CHRONICITY, UNSPECIFIED GASTRITIS TYPE: Primary | ICD-10-CM

## 2019-01-01 DIAGNOSIS — K59.00 CONSTIPATION, UNSPECIFIED CONSTIPATION TYPE: ICD-10-CM

## 2019-01-01 DIAGNOSIS — E78.5 HYPERLIPIDEMIA, UNSPECIFIED HYPERLIPIDEMIA TYPE: Primary | ICD-10-CM

## 2019-01-01 DIAGNOSIS — I63.239: Primary | ICD-10-CM

## 2019-01-01 DIAGNOSIS — C78.02 SECONDARY MALIGNANT NEOPLASM OF LEFT LUNG (HCC): ICD-10-CM

## 2019-01-01 DIAGNOSIS — M48.02 SPINAL STENOSIS OF CERVICAL REGION: Primary | ICD-10-CM

## 2019-01-01 DIAGNOSIS — K29.71 GASTRITIS WITH HEMORRHAGE, UNSPECIFIED CHRONICITY, UNSPECIFIED GASTRITIS TYPE: ICD-10-CM

## 2019-01-01 DIAGNOSIS — R52 SEVERE PAIN: Primary | ICD-10-CM

## 2019-01-01 DIAGNOSIS — G26 EXTRAPYRAMIDAL AND MOVEMENT DISORDERS IN DISEASES CLASSIFIED ELSEWHERE: ICD-10-CM

## 2019-01-01 DIAGNOSIS — M15.9 PRIMARY OSTEOARTHRITIS INVOLVING MULTIPLE JOINTS: Primary | ICD-10-CM

## 2019-01-01 DIAGNOSIS — R52 SEVERE PAIN: ICD-10-CM

## 2019-01-01 DIAGNOSIS — K92.2 ACUTE GI BLEEDING: Primary | ICD-10-CM

## 2019-01-01 DIAGNOSIS — K92.2 ACUTE GI BLEEDING: ICD-10-CM

## 2019-01-01 DIAGNOSIS — M40.30 FLAT BACK SYNDROME, ACQUIRED: Primary | ICD-10-CM

## 2019-01-01 DIAGNOSIS — M79.602 PAIN OF LEFT UPPER EXTREMITY: Primary | ICD-10-CM

## 2019-01-01 DIAGNOSIS — E87.6 HYPOKALEMIA: ICD-10-CM

## 2019-01-01 DIAGNOSIS — D64.9 ANEMIA, UNSPECIFIED TYPE: ICD-10-CM

## 2019-01-01 DIAGNOSIS — G89.29 CHRONIC LOW BACK PAIN, UNSPECIFIED BACK PAIN LATERALITY, WITH SCIATICA PRESENCE UNSPECIFIED: ICD-10-CM

## 2019-01-01 LAB
ABO + RH BLD: NORMAL
ABO + RH BLD: NORMAL
ABO GROUP BLD: NORMAL
ALBUMIN SERPL-MCNC: 3.1 G/DL (ref 3.5–4.8)
ALBUMIN SERPL-MCNC: 3.5 G/DL (ref 3.6–4.8)
ALBUMIN SERPL-MCNC: 3.6 G/DL (ref 3.5–4.8)
ALBUMIN/GLOB SERPL: 0.9 G/DL (ref 1–1.7)
ALBUMIN/GLOB SERPL: 0.9 G/DL (ref 1–1.7)
ALBUMIN/GLOB SERPL: 1.1 {RATIO} (ref 1.2–2.2)
ALP SERPL-CCNC: 101 U/L (ref 32–91)
ALP SERPL-CCNC: 106 U/L (ref 32–91)
ALP SERPL-CCNC: 132 IU/L (ref 39–117)
ALT SERPL W P-5'-P-CCNC: 11 U/L (ref 14–54)
ALT SERPL W P-5'-P-CCNC: 12 U/L (ref 14–54)
ALT SERPL-CCNC: 9 IU/L (ref 0–32)
ANION GAP SERPL CALCULATED.3IONS-SCNC: 10 MMOL/L (ref 5–15)
ANION GAP SERPL CALCULATED.3IONS-SCNC: 12 MMOL/L (ref 5–15)
ANION GAP SERPL CALCULATED.3IONS-SCNC: 15.1 MMOL/L (ref 5–15)
ANISOCYTOSIS BLD QL: ABNORMAL
APTT PPP: 21.4 SECONDS (ref 24–31)
AST SERPL-CCNC: 14 IU/L (ref 0–40)
AST SERPL-CCNC: 15 U/L (ref 15–41)
AST SERPL-CCNC: 19 U/L (ref 15–41)
BASOPHILS # BLD AUTO: 0 X10E3/UL (ref 0–0.2)
BASOPHILS # BLD AUTO: 0 X10E3/UL (ref 0–0.2)
BASOPHILS # BLD AUTO: 0.04 10*3/MM3 (ref 0–0.2)
BASOPHILS # BLD AUTO: 0.1 10*3/MM3 (ref 0–0.2)
BASOPHILS NFR BLD AUTO: 0 %
BASOPHILS NFR BLD AUTO: 0 %
BASOPHILS NFR BLD AUTO: 0.3 % (ref 0–1.5)
BASOPHILS NFR BLD AUTO: 0.6 % (ref 0–1.5)
BH BB BLOOD EXPIRATION DATE: NORMAL
BH BB BLOOD EXPIRATION DATE: NORMAL
BH BB BLOOD TYPE BARCODE: 6200
BH BB BLOOD TYPE BARCODE: 6200
BH BB DISPENSE STATUS: NORMAL
BH BB DISPENSE STATUS: NORMAL
BH BB PRODUCT CODE: NORMAL
BH BB PRODUCT CODE: NORMAL
BH BB UNIT NUMBER: NORMAL
BH BB UNIT NUMBER: NORMAL
BH CV ECHO MEAS - ACS: 1.7 CM
BH CV ECHO MEAS - AI DEC SLOPE: 268.5 CM/SEC^2
BH CV ECHO MEAS - AI DEC TIME: 1.6 SEC
BH CV ECHO MEAS - AI MAX PG: 72.2 MMHG
BH CV ECHO MEAS - AI MAX VEL: 424.6 CM/SEC
BH CV ECHO MEAS - AI P1/2T: 463.3 MSEC
BH CV ECHO MEAS - AO MAX PG (FULL): 5.8 MMHG
BH CV ECHO MEAS - AO MAX PG: 13.9 MMHG
BH CV ECHO MEAS - AO MEAN PG (FULL): 2.5 MMHG
BH CV ECHO MEAS - AO MEAN PG: 6.2 MMHG
BH CV ECHO MEAS - AO ROOT AREA (BSA CORRECTED): 1.4
BH CV ECHO MEAS - AO ROOT AREA: 5.3 CM^2
BH CV ECHO MEAS - AO ROOT DIAM: 2.6 CM
BH CV ECHO MEAS - AO V2 MAX: 186.6 CM/SEC
BH CV ECHO MEAS - AO V2 MEAN: 115.3 CM/SEC
BH CV ECHO MEAS - AO V2 VTI: 36.5 CM
BH CV ECHO MEAS - AVA(I,A): 2.6 CM^2
BH CV ECHO MEAS - AVA(I,D): 2.6 CM^2
BH CV ECHO MEAS - AVA(V,A): 2.3 CM^2
BH CV ECHO MEAS - AVA(V,D): 2.3 CM^2
BH CV ECHO MEAS - BSA(HAYCOCK): 1.9 M^2
BH CV ECHO MEAS - BSA: 1.8 M^2
BH CV ECHO MEAS - BZI_BMI: 30.3 KILOGRAMS/M^2
BH CV ECHO MEAS - BZI_METRIC_HEIGHT: 160 CM
BH CV ECHO MEAS - BZI_METRIC_WEIGHT: 77.6 KG
BH CV ECHO MEAS - EDV(CUBED): 129 ML
BH CV ECHO MEAS - EDV(MOD-SP4): 91.3 ML
BH CV ECHO MEAS - EDV(TEICH): 121.2 ML
BH CV ECHO MEAS - EF(CUBED): 64.2 %
BH CV ECHO MEAS - EF(MOD-BP): 55 %
BH CV ECHO MEAS - EF(MOD-SP4): 56.4 %
BH CV ECHO MEAS - EF(TEICH): 55.5 %
BH CV ECHO MEAS - ESV(CUBED): 46.2 ML
BH CV ECHO MEAS - ESV(MOD-SP4): 39.8 ML
BH CV ECHO MEAS - ESV(TEICH): 54 ML
BH CV ECHO MEAS - FS: 29 %
BH CV ECHO MEAS - IVS/LVPW: 1
BH CV ECHO MEAS - IVSD: 1.2 CM
BH CV ECHO MEAS - LA DIMENSION: 3.5 CM
BH CV ECHO MEAS - LA/AO: 1.3
BH CV ECHO MEAS - LV DIASTOLIC VOL/BSA (35-75): 50.5 ML/M^2
BH CV ECHO MEAS - LV MASS(C)D: 243.9 GRAMS
BH CV ECHO MEAS - LV MASS(C)DI: 134.8 GRAMS/M^2
BH CV ECHO MEAS - LV MAX PG: 8.1 MMHG
BH CV ECHO MEAS - LV MEAN PG: 3.8 MMHG
BH CV ECHO MEAS - LV SYSTOLIC VOL/BSA (12-30): 22 ML/M^2
BH CV ECHO MEAS - LV V1 MAX: 142.2 CM/SEC
BH CV ECHO MEAS - LV V1 MEAN: 88.9 CM/SEC
BH CV ECHO MEAS - LV V1 VTI: 31.2 CM
BH CV ECHO MEAS - LVIDD: 5.1 CM
BH CV ECHO MEAS - LVIDS: 3.6 CM
BH CV ECHO MEAS - LVOT AREA: 3.1 CM^2
BH CV ECHO MEAS - LVOT DIAM: 2 CM
BH CV ECHO MEAS - LVPWD: 1.2 CM
BH CV ECHO MEAS - MR MAX PG: 112.4 MMHG
BH CV ECHO MEAS - MR MAX VEL: 530.1 CM/SEC
BH CV ECHO MEAS - MV A MAX VEL: 99.8 CM/SEC
BH CV ECHO MEAS - MV E MAX VEL: 104.8 CM/SEC
BH CV ECHO MEAS - MV E/A: 1.1
BH CV ECHO MEAS - MV MAX PG: 4.4 MMHG
BH CV ECHO MEAS - MV MEAN PG: 2 MMHG
BH CV ECHO MEAS - MV V2 MAX: 105.1 CM/SEC
BH CV ECHO MEAS - MV V2 MEAN: 67.7 CM/SEC
BH CV ECHO MEAS - MV V2 VTI: 26.1 CM
BH CV ECHO MEAS - MVA(VTI): 3.7 CM^2
BH CV ECHO MEAS - PA ACC TIME: 0.1 SEC
BH CV ECHO MEAS - PA MAX PG: 8.6 MMHG
BH CV ECHO MEAS - PA PR(ACCEL): 35.3 MMHG
BH CV ECHO MEAS - PA V2 MAX: 146.5 CM/SEC
BH CV ECHO MEAS - PI END-D VEL: 144.6 CM/SEC
BH CV ECHO MEAS - RAP SYSTOLE: 10 MMHG
BH CV ECHO MEAS - RVDD(2D): 2.2 CM
BH CV ECHO MEAS - RVDD: 2.2 CM
BH CV ECHO MEAS - RVSP: 59.3 MMHG
BH CV ECHO MEAS - SI(AO): 106.7 ML/M^2
BH CV ECHO MEAS - SI(CUBED): 45.8 ML/M^2
BH CV ECHO MEAS - SI(LVOT): 52.9 ML/M^2
BH CV ECHO MEAS - SI(MOD-SP4): 28.4 ML/M^2
BH CV ECHO MEAS - SI(TEICH): 37.1 ML/M^2
BH CV ECHO MEAS - SV(AO): 193.1 ML
BH CV ECHO MEAS - SV(CUBED): 82.8 ML
BH CV ECHO MEAS - SV(LVOT): 95.8 ML
BH CV ECHO MEAS - SV(MOD-SP4): 51.5 ML
BH CV ECHO MEAS - SV(TEICH): 67.2 ML
BH CV ECHO MEAS - TR MAX VEL: 309.7 CM/SEC
BH CV LOWER VASCULAR LEFT COMMON FEMORAL AUGMENT: NORMAL
BH CV LOWER VASCULAR LEFT COMMON FEMORAL COMPETENT: NORMAL
BH CV LOWER VASCULAR LEFT COMMON FEMORAL COMPRESS: NORMAL
BH CV LOWER VASCULAR LEFT COMMON FEMORAL PHASIC: NORMAL
BH CV LOWER VASCULAR LEFT COMMON FEMORAL SPONT: NORMAL
BH CV LOWER VASCULAR LEFT DISTAL FEMORAL COMPRESS: NORMAL
BH CV LOWER VASCULAR LEFT GASTRONEMIUS COMPRESS: NORMAL
BH CV LOWER VASCULAR LEFT GREATER SAPH AK COMPRESS: NORMAL
BH CV LOWER VASCULAR LEFT GREATER SAPH BK COMPRESS: NORMAL
BH CV LOWER VASCULAR LEFT LESSER SAPH COMPRESS: NORMAL
BH CV LOWER VASCULAR LEFT MID FEMORAL AUGMENT: NORMAL
BH CV LOWER VASCULAR LEFT MID FEMORAL COMPETENT: NORMAL
BH CV LOWER VASCULAR LEFT MID FEMORAL COMPRESS: NORMAL
BH CV LOWER VASCULAR LEFT MID FEMORAL PHASIC: NORMAL
BH CV LOWER VASCULAR LEFT MID FEMORAL SPONT: NORMAL
BH CV LOWER VASCULAR LEFT PERONEAL COMPRESS: NORMAL
BH CV LOWER VASCULAR LEFT POPLITEAL AUGMENT: NORMAL
BH CV LOWER VASCULAR LEFT POPLITEAL COMPETENT: NORMAL
BH CV LOWER VASCULAR LEFT POPLITEAL COMPRESS: NORMAL
BH CV LOWER VASCULAR LEFT POPLITEAL PHASIC: NORMAL
BH CV LOWER VASCULAR LEFT POPLITEAL SPONT: NORMAL
BH CV LOWER VASCULAR LEFT POSTERIOR TIBIAL COMPRESS: NORMAL
BH CV LOWER VASCULAR LEFT PROXIMAL FEMORAL COMPRESS: NORMAL
BH CV LOWER VASCULAR LEFT SAPHENOFEMORAL JUNCTION AUGMENT: NORMAL
BH CV LOWER VASCULAR LEFT SAPHENOFEMORAL JUNCTION COMPETENT: NORMAL
BH CV LOWER VASCULAR LEFT SAPHENOFEMORAL JUNCTION COMPRESS: NORMAL
BH CV LOWER VASCULAR LEFT SAPHENOFEMORAL JUNCTION PHASIC: NORMAL
BH CV LOWER VASCULAR LEFT SAPHENOFEMORAL JUNCTION SPONT: NORMAL
BH CV LOWER VASCULAR RIGHT COMMON FEMORAL AUGMENT: NORMAL
BH CV LOWER VASCULAR RIGHT COMMON FEMORAL COMPETENT: NORMAL
BH CV LOWER VASCULAR RIGHT COMMON FEMORAL COMPRESS: NORMAL
BH CV LOWER VASCULAR RIGHT COMMON FEMORAL PHASIC: NORMAL
BH CV LOWER VASCULAR RIGHT COMMON FEMORAL SPONT: NORMAL
BILIRUB SERPL-MCNC: 0.4 MG/DL (ref 0.3–1.2)
BILIRUB SERPL-MCNC: 0.5 MG/DL (ref 0.3–1.2)
BILIRUB SERPL-MCNC: <0.2 MG/DL (ref 0–1.2)
BLD GP AB SCN SERPL QL: NEGATIVE
BUN BLD-MCNC: 10 MG/DL (ref 8–20)
BUN BLD-MCNC: 11 MG/DL (ref 8–20)
BUN BLD-MCNC: 9 MG/DL (ref 8–23)
BUN SERPL-MCNC: 16 MG/DL (ref 8–27)
BUN SERPL-MCNC: 9 MG/DL (ref 8–27)
BUN/CREAT SERPL: 10 (ref 5.4–26.2)
BUN/CREAT SERPL: 13 (ref 12–28)
BUN/CREAT SERPL: 9 (ref 12–28)
BUN/CREAT SERPL: 9.1 (ref 5.4–26.2)
BUN/CREAT SERPL: 9.9 (ref 7–25)
CALCIUM SERPL-MCNC: 9.3 MG/DL (ref 8.7–10.3)
CALCIUM SERPL-MCNC: 9.5 MG/DL (ref 8.7–10.3)
CALCIUM SPEC-SCNC: 8.7 MG/DL (ref 8.9–10.3)
CALCIUM SPEC-SCNC: 9.2 MG/DL (ref 8.9–10.3)
CALCIUM SPEC-SCNC: 9.7 MG/DL (ref 8.6–10.5)
CEA SERPL-MCNC: 4.05 NG/ML (ref 0–5)
CHLORIDE SERPL-SCNC: 101 MMOL/L (ref 98–107)
CHLORIDE SERPL-SCNC: 105 MMOL/L (ref 96–106)
CHLORIDE SERPL-SCNC: 106 MMOL/L (ref 101–111)
CHLORIDE SERPL-SCNC: 95 MMOL/L (ref 96–106)
CHLORIDE SERPL-SCNC: 97 MMOL/L (ref 101–111)
CO2 SERPL-SCNC: 20 MMOL/L (ref 20–29)
CO2 SERPL-SCNC: 23 MMOL/L (ref 20–29)
CO2 SERPL-SCNC: 25 MMOL/L (ref 22–32)
CO2 SERPL-SCNC: 26 MMOL/L (ref 22–32)
CO2 SERPL-SCNC: 28 MMOL/L (ref 22–29)
CREAT BLD-MCNC: 0.91 MG/DL (ref 0.57–1)
CREAT BLD-MCNC: 1.1 MG/DL (ref 0.4–1)
CREAT BLD-MCNC: 1.1 MG/DL (ref 0.4–1)
CREAT SERPL-MCNC: 0.96 MG/DL (ref 0.57–1)
CREAT SERPL-MCNC: 1.22 MG/DL (ref 0.57–1)
DEPRECATED RDW RBC AUTO: 54.8 FL (ref 37–54)
DEPRECATED RDW RBC AUTO: 56.4 FL (ref 37–54)
DEPRECATED RDW RBC AUTO: 62.1 FL (ref 37–54)
DEPRECATED RDW RBC AUTO: 63.9 FL (ref 37–54)
EOSINOPHIL # BLD AUTO: 0.18 10*3/MM3 (ref 0–0.4)
EOSINOPHIL # BLD AUTO: 0.2 X10E3/UL (ref 0–0.4)
EOSINOPHIL # BLD AUTO: 0.3 10*3/MM3 (ref 0–0.4)
EOSINOPHIL # BLD AUTO: 0.6 X10E3/UL (ref 0–0.4)
EOSINOPHIL NFR BLD AUTO: 1 %
EOSINOPHIL NFR BLD AUTO: 1.3 % (ref 0.3–6.2)
EOSINOPHIL NFR BLD AUTO: 2.7 % (ref 0.3–6.2)
EOSINOPHIL NFR BLD AUTO: 4 %
ERYTHROCYTE [DISTWIDTH] IN BLOOD BY AUTOMATED COUNT: 19.7 % (ref 12.3–15.4)
ERYTHROCYTE [DISTWIDTH] IN BLOOD BY AUTOMATED COUNT: 20.5 % (ref 12.3–15.4)
ERYTHROCYTE [DISTWIDTH] IN BLOOD BY AUTOMATED COUNT: 21.7 % (ref 12.3–15.4)
ERYTHROCYTE [DISTWIDTH] IN BLOOD BY AUTOMATED COUNT: 21.8 % (ref 12.3–15.4)
ERYTHROCYTE [DISTWIDTH] IN BLOOD BY AUTOMATED COUNT: 22.6 % (ref 12.3–15.4)
ERYTHROCYTE [DISTWIDTH] IN BLOOD BY AUTOMATED COUNT: 23.7 % (ref 12.3–15.4)
ERYTHROCYTE [SEDIMENTATION RATE] IN BLOOD BY WESTERGREN METHOD: 39 MM/HR (ref 0–40)
FERRITIN SERPL-MCNC: 8 NG/ML (ref 11–307)
FOLATE SERPL-MCNC: 15.3 NG/ML (ref 5.9–24.8)
GFR SERPL CREATININE-BSD FRML MDRD: 50 ML/MIN/1.73
GFR SERPL CREATININE-BSD FRML MDRD: 50 ML/MIN/1.73
GFR SERPL CREATININE-BSD FRML MDRD: 63 ML/MIN/1.73
GIANT PLATELETS: ABNORMAL
GLOBULIN SER CALC-MCNC: 3.2 G/DL (ref 1.5–4.5)
GLOBULIN UR ELPH-MCNC: 3.4 GM/DL (ref 2.5–3.8)
GLOBULIN UR ELPH-MCNC: 4 GM/DL (ref 2.5–3.8)
GLUCOSE BLD-MCNC: 113 MG/DL (ref 65–99)
GLUCOSE BLD-MCNC: 83 MG/DL (ref 65–99)
GLUCOSE BLD-MCNC: 94 MG/DL (ref 65–99)
GLUCOSE BLDC GLUCOMTR-MCNC: 91 MG/DL (ref 70–105)
GLUCOSE SERPL-MCNC: 80 MG/DL (ref 65–99)
GLUCOSE SERPL-MCNC: 83 MG/DL (ref 65–99)
HAPTOGLOB SERPL-MCNC: 155 MG/DL (ref 36–195)
HCT VFR BLD AUTO: 19.5 % (ref 34–46.6)
HCT VFR BLD AUTO: 20.5 % (ref 34–46.6)
HCT VFR BLD AUTO: 22.6 % (ref 34–46.6)
HCT VFR BLD AUTO: 23.2 % (ref 34–46.6)
HCT VFR BLD AUTO: 23.4 % (ref 34–46.6)
HCT VFR BLD AUTO: 25.1 % (ref 34–46.6)
HCT VFR BLD AUTO: 26.9 % (ref 34–46.6)
HCT VFR BLD AUTO: 32.4 % (ref 34–46.6)
HCT VFR BLD AUTO: 33.5 % (ref 34–46.6)
HCT VFR BLD AUTO: 34.8 % (ref 34–46.6)
HGB BLD-MCNC: 10.3 G/DL (ref 12–15.9)
HGB BLD-MCNC: 10.5 G/DL (ref 12–15.9)
HGB BLD-MCNC: 11.2 G/DL (ref 12–15.9)
HGB BLD-MCNC: 6 G/DL (ref 12–15.9)
HGB BLD-MCNC: 6.2 G/DL (ref 12–15.9)
HGB BLD-MCNC: 7 G/DL (ref 12–15.9)
HGB BLD-MCNC: 7 G/DL (ref 12–15.9)
HGB BLD-MCNC: 7.1 G/DL (ref 11.1–15.9)
HGB BLD-MCNC: 7.7 G/DL (ref 12–15.9)
HGB BLD-MCNC: 8.2 G/DL (ref 11.1–15.9)
IMM GRANULOCYTES # BLD AUTO: 0 X10E3/UL (ref 0–0.1)
IMM GRANULOCYTES # BLD AUTO: 0 X10E3/UL (ref 0–0.1)
IMM GRANULOCYTES NFR BLD AUTO: 0 %
IMM GRANULOCYTES NFR BLD AUTO: 0 %
INR PPP: 1.02 (ref 0.9–1.1)
IRON 24H UR-MRATE: 10 MCG/DL (ref 28–170)
IRON SATN MFR SERPL: 2 % (ref 15–50)
LAB AP CASE REPORT: NORMAL
LAB AP CLINICAL INFORMATION: NORMAL
LAB AP DIAGNOSIS COMMENT: NORMAL
LYMPHOCYTES # BLD AUTO: 1.6 10*3/MM3 (ref 0.7–3.1)
LYMPHOCYTES # BLD AUTO: 2.2 X10E3/UL (ref 0.7–3.1)
LYMPHOCYTES # BLD AUTO: 2.26 10*3/MM3 (ref 0.7–3.1)
LYMPHOCYTES # BLD AUTO: 2.7 X10E3/UL (ref 0.7–3.1)
LYMPHOCYTES # BLD MANUAL: 0.93 10*3/MM3 (ref 0.7–3.1)
LYMPHOCYTES NFR BLD AUTO: 16.2 % (ref 19.6–45.3)
LYMPHOCYTES NFR BLD AUTO: 16.3 % (ref 19.6–45.3)
LYMPHOCYTES NFR BLD AUTO: 17 %
LYMPHOCYTES NFR BLD AUTO: 20 %
LYMPHOCYTES NFR BLD MANUAL: 6 % (ref 5–12)
LYMPHOCYTES NFR BLD MANUAL: 7 % (ref 19.6–45.3)
Lab: NORMAL
MAGNESIUM SERPL-MCNC: 2 MG/DL (ref 1.8–2.5)
MAXIMAL PREDICTED HEART RATE: 158 BPM
MCH RBC QN AUTO: 23.2 PG (ref 26.6–33)
MCH RBC QN AUTO: 23.5 PG (ref 26.6–33)
MCH RBC QN AUTO: 23.6 PG (ref 26.6–33)
MCH RBC QN AUTO: 24 PG (ref 26.6–33)
MCH RBC QN AUTO: 24.5 PG (ref 26.6–33)
MCH RBC QN AUTO: 26.8 PG (ref 26.6–33)
MCHC RBC AUTO-ENTMCNC: 30 G/DL (ref 31.5–35.7)
MCHC RBC AUTO-ENTMCNC: 30.5 G/DL (ref 31.5–35.7)
MCHC RBC AUTO-ENTMCNC: 30.6 G/DL (ref 31.5–35.7)
MCHC RBC AUTO-ENTMCNC: 30.7 G/DL (ref 31.5–35.7)
MCHC RBC AUTO-ENTMCNC: 32.3 G/DL (ref 31.5–35.7)
MCHC RBC AUTO-ENTMCNC: 32.4 G/DL (ref 31.5–35.7)
MCV RBC AUTO: 72.9 FL (ref 79–97)
MCV RBC AUTO: 75.7 FL (ref 79–97)
MCV RBC AUTO: 76 FL (ref 79–97)
MCV RBC AUTO: 78.1 FL (ref 79–97)
MCV RBC AUTO: 78.8 FL (ref 79–97)
MCV RBC AUTO: 88 FL (ref 79–97)
MICROCYTES BLD QL: ABNORMAL
MONOCYTES # BLD AUTO: 0.5 X10E3/UL (ref 0.1–0.9)
MONOCYTES # BLD AUTO: 0.69 10*3/MM3 (ref 0.1–0.9)
MONOCYTES # BLD AUTO: 0.7 10*3/MM3 (ref 0.1–0.9)
MONOCYTES # BLD AUTO: 0.7 X10E3/UL (ref 0.1–0.9)
MONOCYTES # BLD AUTO: 0.8 10*3/MM3 (ref 0.1–0.9)
MONOCYTES NFR BLD AUTO: 4 %
MONOCYTES NFR BLD AUTO: 5 %
MONOCYTES NFR BLD AUTO: 5 % (ref 5–12)
MONOCYTES NFR BLD AUTO: 6.8 % (ref 5–12)
MRSA SPEC QL CULT: NORMAL
NEUTROPHILS # BLD AUTO: 10.2 X10E3/UL (ref 1.4–7)
NEUTROPHILS # BLD AUTO: 10.73 10*3/MM3 (ref 1.7–7)
NEUTROPHILS # BLD AUTO: 11.57 10*3/MM3 (ref 1.7–7)
NEUTROPHILS # BLD AUTO: 7.1 10*3/MM3 (ref 1.7–7)
NEUTROPHILS # BLD AUTO: 9.1 X10E3/UL (ref 1.4–7)
NEUTROPHILS NFR BLD AUTO: 73.7 % (ref 42.7–76)
NEUTROPHILS NFR BLD AUTO: 74 %
NEUTROPHILS NFR BLD AUTO: 75 %
NEUTROPHILS NFR BLD AUTO: 77.1 % (ref 42.7–76)
NEUTROPHILS NFR BLD MANUAL: 86 % (ref 42.7–76)
NEUTS BAND NFR BLD MANUAL: 1 % (ref 0–5)
NEUTS VAC BLD QL SMEAR: ABNORMAL
NRBC BLD AUTO-RTO: 0.1 /100 WBC (ref 0–0.2)
PATH REPORT.ADDENDUM SPEC: NORMAL
PATH REPORT.FINAL DX SPEC: NORMAL
PATH REPORT.GROSS SPEC: NORMAL
PLATELET # BLD AUTO: 281 10*3/MM3 (ref 140–450)
PLATELET # BLD AUTO: 319 10*3/MM3 (ref 140–450)
PLATELET # BLD AUTO: 340 10*3/MM3 (ref 140–450)
PLATELET # BLD AUTO: 347 10*3/MM3 (ref 140–450)
PLATELET # BLD AUTO: 413 X10E3/UL (ref 150–450)
PLATELET # BLD AUTO: 439 X10E3/UL (ref 150–450)
PMV BLD AUTO: 7.9 FL (ref 6–12)
PMV BLD AUTO: 8.1 FL (ref 6–12)
PMV BLD AUTO: 8.3 FL (ref 6–12)
PMV BLD AUTO: 9.9 FL (ref 6–12)
POLYCHROMASIA BLD QL SMEAR: ABNORMAL
POTASSIUM BLD-SCNC: 3 MMOL/L (ref 3.6–5.1)
POTASSIUM BLD-SCNC: 3.1 MMOL/L (ref 3.6–5.1)
POTASSIUM BLD-SCNC: 3.8 MMOL/L (ref 3.5–5.2)
POTASSIUM SERPL-SCNC: 3.7 MMOL/L (ref 3.5–5.2)
POTASSIUM SERPL-SCNC: 3.7 MMOL/L (ref 3.5–5.2)
PROT SERPL-MCNC: 6.5 G/DL (ref 6.1–7.9)
PROT SERPL-MCNC: 6.7 G/DL (ref 6–8.5)
PROT SERPL-MCNC: 7.6 G/DL (ref 6.1–7.9)
PROTHROMBIN TIME: 10.6 SECONDS (ref 9.6–11.7)
RBC # BLD AUTO: 2.61 10*6/MM3 (ref 3.77–5.28)
RBC # BLD AUTO: 3.06 X10E6/UL (ref 3.77–5.28)
RBC # BLD AUTO: 3.06 X10E6/UL (ref 3.77–5.28)
RBC # BLD AUTO: 4.27 10*6/MM3 (ref 3.77–5.28)
RBC # BLD AUTO: 4.29 10*6/MM3 (ref 3.77–5.28)
RBC # BLD AUTO: 4.78 10*6/MM3 (ref 3.77–5.28)
RETICS # AUTO: 0.08 10*6/MM3 (ref 0.02–0.13)
RETICS/RBC NFR AUTO: 3.28 % (ref 0.7–1.9)
RH BLD: POSITIVE
SCAN SLIDE: NORMAL
SODIUM BLD-SCNC: 135 MMOL/L (ref 136–144)
SODIUM BLD-SCNC: 139 MMOL/L (ref 136–145)
SODIUM BLD-SCNC: 140 MMOL/L (ref 136–144)
SODIUM SERPL-SCNC: 135 MMOL/L (ref 134–144)
SODIUM SERPL-SCNC: 143 MMOL/L (ref 134–144)
STRESS TARGET HR: 134 BPM
T&S EXPIRATION DATE: NORMAL
TIBC SERPL-MCNC: 406 MCG/DL (ref 228–428)
TRANSFERRIN SERPL-MCNC: 290 MG/DL (ref 190–380)
TSH SERPL DL<=0.05 MIU/L-ACNC: 3.89 UIU/ML (ref 0.34–5.6)
UNIT  ABO: NORMAL
UNIT  ABO: NORMAL
UNIT  RH: NORMAL
UNIT  RH: NORMAL
URATE SERPL-MCNC: 7.1 MG/DL (ref 2.5–7.1)
VIT B12 BLD-MCNC: 297 PG/ML (ref 180–914)
WBC # BLD AUTO: 12.4 X10E3/UL (ref 3.4–10.8)
WBC # BLD AUTO: 13.7 X10E3/UL (ref 3.4–10.8)
WBC NRBC COR # BLD: 13.3 10*3/MM3 (ref 3.4–10.8)
WBC NRBC COR # BLD: 13.9 10*3/MM3 (ref 3.4–10.8)
WBC NRBC COR # BLD: 8.4 10*3/MM3 (ref 3.4–10.8)
WBC NRBC COR # BLD: 9.7 10*3/MM3 (ref 3.4–10.8)

## 2019-01-01 PROCEDURE — 88305 TISSUE EXAM BY PATHOLOGIST: CPT | Performed by: INTERNAL MEDICINE

## 2019-01-01 PROCEDURE — 36415 COLL VENOUS BLD VENIPUNCTURE: CPT

## 2019-01-01 PROCEDURE — 99205 OFFICE O/P NEW HI 60 MIN: CPT | Performed by: INTERNAL MEDICINE

## 2019-01-01 PROCEDURE — 36590 REMOVAL TUNNELED CV CATH: CPT | Performed by: THORACIC SURGERY (CARDIOTHORACIC VASCULAR SURGERY)

## 2019-01-01 PROCEDURE — 78452 HT MUSCLE IMAGE SPECT MULT: CPT

## 2019-01-01 PROCEDURE — 80053 COMPREHEN METABOLIC PANEL: CPT | Performed by: INTERNAL MEDICINE

## 2019-01-01 PROCEDURE — 99214 OFFICE O/P EST MOD 30 MIN: CPT | Performed by: FAMILY MEDICINE

## 2019-01-01 PROCEDURE — 85018 HEMOGLOBIN: CPT | Performed by: NURSE PRACTITIONER

## 2019-01-01 PROCEDURE — 0 FLUDEOXYGLUCOSE F18 SOLUTION: Performed by: INTERNAL MEDICINE

## 2019-01-01 PROCEDURE — 77290 THER RAD SIMULAJ FIELD CPLX: CPT | Performed by: RADIOLOGY

## 2019-01-01 PROCEDURE — C1788 PORT, INDWELLING, IMP: HCPCS | Performed by: THORACIC SURGERY (CARDIOTHORACIC VASCULAR SURGERY)

## 2019-01-01 PROCEDURE — 25010000002 FENTANYL CITRATE (PF) 100 MCG/2ML SOLUTION: Performed by: ANESTHESIOLOGIST ASSISTANT

## 2019-01-01 PROCEDURE — 77386: CPT | Performed by: RADIOLOGY

## 2019-01-01 PROCEDURE — 85045 AUTOMATED RETICULOCYTE COUNT: CPT | Performed by: INTERNAL MEDICINE

## 2019-01-01 PROCEDURE — 85014 HEMATOCRIT: CPT | Performed by: NURSE PRACTITIONER

## 2019-01-01 PROCEDURE — 25010000002 FERRIC CARBOXYMALTOSE 750 MG/15ML SOLUTION 15 ML VIAL: Performed by: NURSE PRACTITIONER

## 2019-01-01 PROCEDURE — 77334 RADIATION TREATMENT AID(S): CPT | Performed by: RADIOLOGY

## 2019-01-01 PROCEDURE — 88331 PATH CONSLTJ SURG 1 BLK 1SPC: CPT | Performed by: THORACIC SURGERY (CARDIOTHORACIC VASCULAR SURGERY)

## 2019-01-01 PROCEDURE — 86850 RBC ANTIBODY SCREEN: CPT | Performed by: NURSE PRACTITIONER

## 2019-01-01 PROCEDURE — 93306 TTE W/DOPPLER COMPLETE: CPT | Performed by: INTERNAL MEDICINE

## 2019-01-01 PROCEDURE — 99239 HOSP IP/OBS DSCHRG MGMT >30: CPT | Performed by: INTERNAL MEDICINE

## 2019-01-01 PROCEDURE — 86901 BLOOD TYPING SEROLOGIC RH(D): CPT | Performed by: NURSE PRACTITIONER

## 2019-01-01 PROCEDURE — 72131 CT LUMBAR SPINE W/O DYE: CPT

## 2019-01-01 PROCEDURE — 77300 RADIATION THERAPY DOSE PLAN: CPT | Performed by: RADIOLOGY

## 2019-01-01 PROCEDURE — 76000 FLUOROSCOPY <1 HR PHYS/QHP: CPT

## 2019-01-01 PROCEDURE — 85025 COMPLETE CBC W/AUTO DIFF WBC: CPT | Performed by: INTERNAL MEDICINE

## 2019-01-01 PROCEDURE — 82746 ASSAY OF FOLIC ACID SERUM: CPT | Performed by: INTERNAL MEDICINE

## 2019-01-01 PROCEDURE — 25010000002 HYDROMORPHONE PER 4 MG: Performed by: ANESTHESIOLOGIST ASSISTANT

## 2019-01-01 PROCEDURE — 85730 THROMBOPLASTIN TIME PARTIAL: CPT | Performed by: ANESTHESIOLOGY

## 2019-01-01 PROCEDURE — 99205 OFFICE O/P NEW HI 60 MIN: CPT | Performed by: THORACIC SURGERY (CARDIOTHORACIC VASCULAR SURGERY)

## 2019-01-01 PROCEDURE — 96365 THER/PROPH/DIAG IV INF INIT: CPT

## 2019-01-01 PROCEDURE — 0DB68ZX EXCISION OF STOMACH, VIA NATURAL OR ARTIFICIAL OPENING ENDOSCOPIC, DIAGNOSTIC: ICD-10-PCS | Performed by: INTERNAL MEDICINE

## 2019-01-01 PROCEDURE — 86900 BLOOD TYPING SEROLOGIC ABO: CPT

## 2019-01-01 PROCEDURE — 77295 3-D RADIOTHERAPY PLAN: CPT | Performed by: RADIOLOGY

## 2019-01-01 PROCEDURE — 96372 THER/PROPH/DIAG INJ SC/IM: CPT | Performed by: FAMILY MEDICINE

## 2019-01-01 PROCEDURE — 80048 BASIC METABOLIC PNL TOTAL CA: CPT | Performed by: THORACIC SURGERY (CARDIOTHORACIC VASCULAR SURGERY)

## 2019-01-01 PROCEDURE — 86850 RBC ANTIBODY SCREEN: CPT | Performed by: ANESTHESIOLOGY

## 2019-01-01 PROCEDURE — 85018 HEMOGLOBIN: CPT | Performed by: INTERNAL MEDICINE

## 2019-01-01 PROCEDURE — 99213 OFFICE O/P EST LOW 20 MIN: CPT | Performed by: FAMILY MEDICINE

## 2019-01-01 PROCEDURE — 88341 IMHCHEM/IMCYTCHM EA ADD ANTB: CPT | Performed by: THORACIC SURGERY (CARDIOTHORACIC VASCULAR SURGERY)

## 2019-01-01 PROCEDURE — 77336 RADIATION PHYSICS CONSULT: CPT | Performed by: RADIOLOGY

## 2019-01-01 PROCEDURE — 77412 RADIATION TX DELIVERY LVL 3: CPT | Performed by: RADIOLOGY

## 2019-01-01 PROCEDURE — 77387 GUIDANCE FOR RADJ TX DLVR: CPT | Performed by: RADIOLOGY

## 2019-01-01 PROCEDURE — 83735 ASSAY OF MAGNESIUM: CPT | Performed by: INTERNAL MEDICINE

## 2019-01-01 PROCEDURE — 99222 1ST HOSP IP/OBS MODERATE 55: CPT | Performed by: INTERNAL MEDICINE

## 2019-01-01 PROCEDURE — 25010000002 HYDROMORPHONE PER 4 MG: Performed by: ANESTHESIOLOGY

## 2019-01-01 PROCEDURE — 25010000002 PROPOFOL 10 MG/ML EMULSION: Performed by: ANESTHESIOLOGIST ASSISTANT

## 2019-01-01 PROCEDURE — 93000 ELECTROCARDIOGRAM COMPLETE: CPT | Performed by: FAMILY MEDICINE

## 2019-01-01 PROCEDURE — 0 TECHNETIUM SESTAMIBI: Performed by: INTERNAL MEDICINE

## 2019-01-01 PROCEDURE — 93971 EXTREMITY STUDY: CPT

## 2019-01-01 PROCEDURE — 82728 ASSAY OF FERRITIN: CPT | Performed by: INTERNAL MEDICINE

## 2019-01-01 PROCEDURE — 77001 FLUOROGUIDE FOR VEIN DEVICE: CPT | Performed by: THORACIC SURGERY (CARDIOTHORACIC VASCULAR SURGERY)

## 2019-01-01 PROCEDURE — 86901 BLOOD TYPING SEROLOGIC RH(D): CPT | Performed by: ANESTHESIOLOGY

## 2019-01-01 PROCEDURE — A9500 TC99M SESTAMIBI: HCPCS | Performed by: INTERNAL MEDICINE

## 2019-01-01 PROCEDURE — 78815 PET IMAGE W/CT SKULL-THIGH: CPT

## 2019-01-01 PROCEDURE — 85014 HEMATOCRIT: CPT | Performed by: INTERNAL MEDICINE

## 2019-01-01 PROCEDURE — G0463 HOSPITAL OUTPT CLINIC VISIT: HCPCS | Performed by: RADIOLOGY

## 2019-01-01 PROCEDURE — 99024 POSTOP FOLLOW-UP VISIT: CPT | Performed by: THORACIC SURGERY (CARDIOTHORACIC VASCULAR SURGERY)

## 2019-01-01 PROCEDURE — 86900 BLOOD TYPING SEROLOGIC ABO: CPT | Performed by: NURSE PRACTITIONER

## 2019-01-01 PROCEDURE — 31622 DX BRONCHOSCOPE/WASH: CPT | Performed by: THORACIC SURGERY (CARDIOTHORACIC VASCULAR SURGERY)

## 2019-01-01 PROCEDURE — 0 IOPAMIDOL PER 1 ML: Performed by: FAMILY MEDICINE

## 2019-01-01 PROCEDURE — 86900 BLOOD TYPING SEROLOGIC ABO: CPT | Performed by: ANESTHESIOLOGY

## 2019-01-01 PROCEDURE — 25010000002 REGADENOSON 0.4 MG/5ML SOLUTION: Performed by: INTERNAL MEDICINE

## 2019-01-01 PROCEDURE — 25010000002 METOCLOPRAMIDE PER 10 MG: Performed by: INTERNAL MEDICINE

## 2019-01-01 PROCEDURE — 99213 OFFICE O/P EST LOW 20 MIN: CPT | Performed by: ORTHOPAEDIC SURGERY

## 2019-01-01 PROCEDURE — 82378 CARCINOEMBRYONIC ANTIGEN: CPT | Performed by: INTERNAL MEDICINE

## 2019-01-01 PROCEDURE — 83010 ASSAY OF HAPTOGLOBIN QUANT: CPT | Performed by: INTERNAL MEDICINE

## 2019-01-01 PROCEDURE — 99213 OFFICE O/P EST LOW 20 MIN: CPT | Performed by: THORACIC SURGERY (CARDIOTHORACIC VASCULAR SURGERY)

## 2019-01-01 PROCEDURE — 25010000002 HEPARIN (PORCINE) PER 1000 UNITS: Performed by: THORACIC SURGERY (CARDIOTHORACIC VASCULAR SURGERY)

## 2019-01-01 PROCEDURE — 82962 GLUCOSE BLOOD TEST: CPT

## 2019-01-01 PROCEDURE — 93017 CV STRESS TEST TRACING ONLY: CPT

## 2019-01-01 PROCEDURE — 83540 ASSAY OF IRON: CPT | Performed by: INTERNAL MEDICINE

## 2019-01-01 PROCEDURE — 99496 TRANSJ CARE MGMT HIGH F2F 7D: CPT | Performed by: FAMILY MEDICINE

## 2019-01-01 PROCEDURE — 87081 CULTURE SCREEN ONLY: CPT | Performed by: ANESTHESIOLOGY

## 2019-01-01 PROCEDURE — 36430 TRANSFUSION BLD/BLD COMPNT: CPT

## 2019-01-01 PROCEDURE — A9552 F18 FDG: HCPCS | Performed by: INTERNAL MEDICINE

## 2019-01-01 PROCEDURE — 99204 OFFICE O/P NEW MOD 45 MIN: CPT | Performed by: INTERNAL MEDICINE

## 2019-01-01 PROCEDURE — 72148 MRI LUMBAR SPINE W/O DYE: CPT

## 2019-01-01 PROCEDURE — 0DJD8ZZ INSPECTION OF LOWER INTESTINAL TRACT, VIA NATURAL OR ARTIFICIAL OPENING ENDOSCOPIC: ICD-10-PCS | Performed by: INTERNAL MEDICINE

## 2019-01-01 PROCEDURE — 73590 X-RAY EXAM OF LOWER LEG: CPT

## 2019-01-01 PROCEDURE — 82607 VITAMIN B-12: CPT | Performed by: INTERNAL MEDICINE

## 2019-01-01 PROCEDURE — 93306 TTE W/DOPPLER COMPLETE: CPT

## 2019-01-01 PROCEDURE — 84466 ASSAY OF TRANSFERRIN: CPT | Performed by: INTERNAL MEDICINE

## 2019-01-01 PROCEDURE — 88305 TISSUE EXAM BY PATHOLOGIST: CPT | Performed by: THORACIC SURGERY (CARDIOTHORACIC VASCULAR SURGERY)

## 2019-01-01 PROCEDURE — 85025 COMPLETE CBC W/AUTO DIFF WBC: CPT | Performed by: ANESTHESIOLOGY

## 2019-01-01 PROCEDURE — 36561 INSERT TUNNELED CV CATH: CPT | Performed by: THORACIC SURGERY (CARDIOTHORACIC VASCULAR SURGERY)

## 2019-01-01 PROCEDURE — 71260 CT THORAX DX C+: CPT

## 2019-01-01 PROCEDURE — 93016 CV STRESS TEST SUPVJ ONLY: CPT | Performed by: INTERNAL MEDICINE

## 2019-01-01 PROCEDURE — P9016 RBC LEUKOCYTES REDUCED: HCPCS

## 2019-01-01 PROCEDURE — 85007 BL SMEAR W/DIFF WBC COUNT: CPT | Performed by: ANESTHESIOLOGY

## 2019-01-01 PROCEDURE — 85610 PROTHROMBIN TIME: CPT | Performed by: ANESTHESIOLOGY

## 2019-01-01 PROCEDURE — 38520 BIOPSY/REMOVAL LYMPH NODES: CPT | Performed by: THORACIC SURGERY (CARDIOTHORACIC VASCULAR SURGERY)

## 2019-01-01 PROCEDURE — 84443 ASSAY THYROID STIM HORMONE: CPT | Performed by: INTERNAL MEDICINE

## 2019-01-01 PROCEDURE — 86901 BLOOD TYPING SEROLOGIC RH(D): CPT

## 2019-01-01 PROCEDURE — 88342 IMHCHEM/IMCYTCHM 1ST ANTB: CPT | Performed by: INTERNAL MEDICINE

## 2019-01-01 PROCEDURE — 88342 IMHCHEM/IMCYTCHM 1ST ANTB: CPT | Performed by: THORACIC SURGERY (CARDIOTHORACIC VASCULAR SURGERY)

## 2019-01-01 PROCEDURE — 86923 COMPATIBILITY TEST ELECTRIC: CPT

## 2019-01-01 PROCEDURE — 85027 COMPLETE CBC AUTOMATED: CPT | Performed by: THORACIC SURGERY (CARDIOTHORACIC VASCULAR SURGERY)

## 2019-01-01 PROCEDURE — 99215 OFFICE O/P EST HI 40 MIN: CPT | Performed by: INTERNAL MEDICINE

## 2019-01-01 DEVICE — POWERPORT M.R.I. IMPLANTABLE PORT WITH ATTACHABLE 8F CHRONOFLEX OPEN-ENDED SINGLE-LUMEN VENOUS CATHETER INTERMEDIATE KIT  (WITH SUTURE PLUGS)
Type: IMPLANTABLE DEVICE | Site: CHEST | Status: FUNCTIONAL
Brand: POWERPORT M.R.I., CHRONOFLEX

## 2019-01-01 RX ORDER — DULOXETIN HYDROCHLORIDE 30 MG/1
60 CAPSULE, DELAYED RELEASE ORAL 2 TIMES DAILY
Status: DISCONTINUED | OUTPATIENT
Start: 2019-01-01 | End: 2019-01-01

## 2019-01-01 RX ORDER — HYDROMORPHONE HCL 110MG/55ML
0.5 PATIENT CONTROLLED ANALGESIA SYRINGE INTRAVENOUS
Status: DISCONTINUED | OUTPATIENT
Start: 2019-01-01 | End: 2019-01-01 | Stop reason: HOSPADM

## 2019-01-01 RX ORDER — PROMETHAZINE HYDROCHLORIDE 25 MG/1
25 SUPPOSITORY RECTAL ONCE AS NEEDED
Status: DISCONTINUED | OUTPATIENT
Start: 2019-01-01 | End: 2019-01-01 | Stop reason: HOSPADM

## 2019-01-01 RX ORDER — ROSUVASTATIN CALCIUM 10 MG/1
10 TABLET, COATED ORAL DAILY
Status: DISCONTINUED | OUTPATIENT
Start: 2019-01-01 | End: 2019-01-01 | Stop reason: HOSPADM

## 2019-01-01 RX ORDER — HYDROCHLOROTHIAZIDE 25 MG/1
50 TABLET ORAL DAILY
Status: DISCONTINUED | OUTPATIENT
Start: 2019-01-01 | End: 2019-01-01 | Stop reason: HOSPADM

## 2019-01-01 RX ORDER — LIDOCAINE HYDROCHLORIDE AND EPINEPHRINE 10; 10 MG/ML; UG/ML
INJECTION, SOLUTION INFILTRATION; PERINEURAL AS NEEDED
Status: DISCONTINUED | OUTPATIENT
Start: 2019-01-01 | End: 2019-01-01 | Stop reason: HOSPADM

## 2019-01-01 RX ORDER — ONDANSETRON 2 MG/ML
4 INJECTION INTRAMUSCULAR; INTRAVENOUS ONCE AS NEEDED
Status: DISCONTINUED | OUTPATIENT
Start: 2019-01-01 | End: 2019-01-01 | Stop reason: HOSPADM

## 2019-01-01 RX ORDER — ROPINIROLE 1 MG/1
4 TABLET, FILM COATED ORAL 3 TIMES DAILY PRN
Status: DISCONTINUED | OUTPATIENT
Start: 2019-01-01 | End: 2019-01-01 | Stop reason: HOSPADM

## 2019-01-01 RX ORDER — ACETAMINOPHEN 325 MG/1
650 TABLET ORAL EVERY 4 HOURS PRN
Status: DISCONTINUED | OUTPATIENT
Start: 2019-01-01 | End: 2019-01-01 | Stop reason: HOSPADM

## 2019-01-01 RX ORDER — ONDANSETRON 4 MG/1
4 TABLET, FILM COATED ORAL EVERY 6 HOURS PRN
Status: DISCONTINUED | OUTPATIENT
Start: 2019-01-01 | End: 2019-01-01

## 2019-01-01 RX ORDER — LIDOCAINE HYDROCHLORIDE 10 MG/ML
INJECTION, SOLUTION EPIDURAL; INFILTRATION; INTRACAUDAL; PERINEURAL AS NEEDED
Status: DISCONTINUED | OUTPATIENT
Start: 2019-01-01 | End: 2019-01-01 | Stop reason: SURG

## 2019-01-01 RX ORDER — SUCRALFATE 1 G/1
1 TABLET ORAL
Qty: 120 TABLET | Refills: 0 | Status: SHIPPED | OUTPATIENT
Start: 2019-01-01 | End: 2019-01-01

## 2019-01-01 RX ORDER — HYDROCHLOROTHIAZIDE 50 MG/1
TABLET ORAL
Qty: 30 TABLET | Refills: 5 | Status: SHIPPED | OUTPATIENT
Start: 2019-01-01 | End: 2019-01-01 | Stop reason: SDUPTHER

## 2019-01-01 RX ORDER — PANTOPRAZOLE SODIUM 40 MG/10ML
40 INJECTION, POWDER, LYOPHILIZED, FOR SOLUTION INTRAVENOUS
Status: DISCONTINUED | OUTPATIENT
Start: 2019-01-01 | End: 2019-01-01 | Stop reason: HOSPADM

## 2019-01-01 RX ORDER — LANOLIN ALCOHOL/MO/W.PET/CERES
1000 CREAM (GRAM) TOPICAL DAILY
Status: DISCONTINUED | OUTPATIENT
Start: 2019-01-01 | End: 2019-01-01 | Stop reason: HOSPADM

## 2019-01-01 RX ORDER — SODIUM CHLORIDE 9 MG/ML
250 INJECTION, SOLUTION INTRAVENOUS AS NEEDED
Status: DISCONTINUED | OUTPATIENT
Start: 2019-01-01 | End: 2019-01-01 | Stop reason: HOSPADM

## 2019-01-01 RX ORDER — ROSUVASTATIN CALCIUM 10 MG/1
TABLET, COATED ORAL
Qty: 30 TABLET | Refills: 12 | Status: SHIPPED | OUTPATIENT
Start: 2019-01-01 | End: 2019-01-01

## 2019-01-01 RX ORDER — SERTRALINE HYDROCHLORIDE 100 MG/1
200 TABLET, FILM COATED ORAL NIGHTLY
Status: DISCONTINUED | OUTPATIENT
Start: 2019-01-01 | End: 2019-01-01 | Stop reason: HOSPADM

## 2019-01-01 RX ORDER — IPRATROPIUM BROMIDE AND ALBUTEROL SULFATE 2.5; .5 MG/3ML; MG/3ML
3 SOLUTION RESPIRATORY (INHALATION) ONCE AS NEEDED
Status: DISCONTINUED | OUTPATIENT
Start: 2019-01-01 | End: 2019-01-01 | Stop reason: HOSPADM

## 2019-01-01 RX ORDER — ONDANSETRON 4 MG/1
4 TABLET, FILM COATED ORAL EVERY 6 HOURS PRN
Status: DISCONTINUED | OUTPATIENT
Start: 2019-01-01 | End: 2019-01-01 | Stop reason: HOSPADM

## 2019-01-01 RX ORDER — ROCURONIUM BROMIDE 10 MG/ML
INJECTION, SOLUTION INTRAVENOUS AS NEEDED
Status: DISCONTINUED | OUTPATIENT
Start: 2019-01-01 | End: 2019-01-01 | Stop reason: SURG

## 2019-01-01 RX ORDER — DULOXETIN HYDROCHLORIDE 30 MG/1
60 CAPSULE, DELAYED RELEASE ORAL DAILY
Status: DISCONTINUED | OUTPATIENT
Start: 2019-01-01 | End: 2019-01-01 | Stop reason: HOSPADM

## 2019-01-01 RX ORDER — HYDRALAZINE HYDROCHLORIDE 20 MG/ML
5 INJECTION INTRAMUSCULAR; INTRAVENOUS
Status: DISCONTINUED | OUTPATIENT
Start: 2019-01-01 | End: 2019-01-01 | Stop reason: HOSPADM

## 2019-01-01 RX ORDER — NALOXONE HCL 0.4 MG/ML
0.4 VIAL (ML) INJECTION AS NEEDED
Status: DISCONTINUED | OUTPATIENT
Start: 2019-01-01 | End: 2019-01-01 | Stop reason: HOSPADM

## 2019-01-01 RX ORDER — METHYLPREDNISOLONE 4 MG/1
TABLET ORAL
Qty: 1 EACH | Refills: 0 | Status: SHIPPED | OUTPATIENT
Start: 2019-01-01 | End: 2019-01-01

## 2019-01-01 RX ORDER — ONDANSETRON 2 MG/ML
4 INJECTION INTRAMUSCULAR; INTRAVENOUS EVERY 6 HOURS PRN
Status: DISCONTINUED | OUTPATIENT
Start: 2019-01-01 | End: 2019-01-01 | Stop reason: HOSPADM

## 2019-01-01 RX ORDER — OXYCODONE AND ACETAMINOPHEN 10; 325 MG/1; MG/1
1 TABLET ORAL 4 TIMES DAILY PRN
Qty: 120 TABLET | Refills: 0
Start: 2019-01-01 | End: 2019-01-01

## 2019-01-01 RX ORDER — HYDROCHLOROTHIAZIDE 50 MG/1
50 TABLET ORAL DAILY
Qty: 30 TABLET | Refills: 5 | Status: SHIPPED | OUTPATIENT
Start: 2019-01-01

## 2019-01-01 RX ORDER — POTASSIUM CHLORIDE 20 MEQ/1
20 TABLET, EXTENDED RELEASE ORAL 2 TIMES DAILY
COMMUNITY

## 2019-01-01 RX ORDER — METHADONE HYDROCHLORIDE 10 MG/1
10 TABLET ORAL EVERY 8 HOURS PRN
Qty: 90 TABLET | Refills: 0 | Status: SHIPPED | OUTPATIENT
Start: 2019-01-01 | End: 2019-01-01 | Stop reason: SDUPTHER

## 2019-01-01 RX ORDER — LABETALOL HYDROCHLORIDE 5 MG/ML
5 INJECTION, SOLUTION INTRAVENOUS
Status: DISCONTINUED | OUTPATIENT
Start: 2019-01-01 | End: 2019-01-01 | Stop reason: HOSPADM

## 2019-01-01 RX ORDER — PROMETHAZINE HYDROCHLORIDE 25 MG/ML
6.25 INJECTION, SOLUTION INTRAMUSCULAR; INTRAVENOUS ONCE AS NEEDED
Status: DISCONTINUED | OUTPATIENT
Start: 2019-01-01 | End: 2019-01-01 | Stop reason: HOSPADM

## 2019-01-01 RX ORDER — SERTRALINE HYDROCHLORIDE 100 MG/1
200 TABLET, FILM COATED ORAL
Qty: 60 TABLET | Refills: 12 | Status: SHIPPED | OUTPATIENT
Start: 2019-01-01 | End: 2019-01-01 | Stop reason: SDUPTHER

## 2019-01-01 RX ORDER — POTASSIUM CHLORIDE 20 MEQ/1
40 TABLET, EXTENDED RELEASE ORAL AS NEEDED
Status: DISCONTINUED | OUTPATIENT
Start: 2019-01-01 | End: 2019-01-01 | Stop reason: HOSPADM

## 2019-01-01 RX ORDER — LIDOCAINE HYDROCHLORIDE 20 MG/ML
INJECTION, SOLUTION EPIDURAL; INFILTRATION; INTRACAUDAL; PERINEURAL AS NEEDED
Status: DISCONTINUED | OUTPATIENT
Start: 2019-01-01 | End: 2019-01-01 | Stop reason: SURG

## 2019-01-01 RX ORDER — FLUMAZENIL 0.1 MG/ML
0.2 INJECTION INTRAVENOUS AS NEEDED
Status: DISCONTINUED | OUTPATIENT
Start: 2019-01-01 | End: 2019-01-01 | Stop reason: HOSPADM

## 2019-01-01 RX ORDER — SODIUM CHLORIDE 9 MG/ML
INJECTION, SOLUTION INTRAVENOUS CONTINUOUS PRN
Status: DISCONTINUED | OUTPATIENT
Start: 2019-01-01 | End: 2019-01-01 | Stop reason: SURG

## 2019-01-01 RX ORDER — POTASSIUM CHLORIDE 20 MEQ/1
20 TABLET, EXTENDED RELEASE ORAL 2 TIMES DAILY
Status: DISCONTINUED | OUTPATIENT
Start: 2019-01-01 | End: 2019-01-01 | Stop reason: HOSPADM

## 2019-01-01 RX ORDER — NEOSTIGMINE METHYLSULFATE 5 MG/5 ML
SYRINGE (ML) INTRAVENOUS AS NEEDED
Status: DISCONTINUED | OUTPATIENT
Start: 2019-01-01 | End: 2019-01-01 | Stop reason: SURG

## 2019-01-01 RX ORDER — FERROUS SULFATE TAB EC 324 MG (65 MG FE EQUIVALENT) 324 (65 FE) MG
324 TABLET DELAYED RESPONSE ORAL
Status: DISCONTINUED | OUTPATIENT
Start: 2019-01-01 | End: 2019-01-01

## 2019-01-01 RX ORDER — MIDAZOLAM HYDROCHLORIDE 1 MG/ML
1 INJECTION INTRAMUSCULAR; INTRAVENOUS
Status: DISCONTINUED | OUTPATIENT
Start: 2019-01-01 | End: 2019-01-01 | Stop reason: HOSPADM

## 2019-01-01 RX ORDER — PROPOFOL 10 MG/ML
VIAL (ML) INTRAVENOUS AS NEEDED
Status: DISCONTINUED | OUTPATIENT
Start: 2019-01-01 | End: 2019-01-01 | Stop reason: SURG

## 2019-01-01 RX ORDER — RALOXIFENE HYDROCHLORIDE 60 MG/1
60 TABLET, FILM COATED ORAL DAILY
COMMUNITY
End: 2019-01-01

## 2019-01-01 RX ORDER — METOCLOPRAMIDE HYDROCHLORIDE 5 MG/ML
10 INJECTION INTRAMUSCULAR; INTRAVENOUS EVERY 6 HOURS
Status: DISCONTINUED | OUTPATIENT
Start: 2019-01-01 | End: 2019-01-01 | Stop reason: HOSPADM

## 2019-01-01 RX ORDER — NITROGLYCERIN 0.4 MG/1
0.4 TABLET SUBLINGUAL
Status: DISCONTINUED | OUTPATIENT
Start: 2019-01-01 | End: 2019-01-01 | Stop reason: HOSPADM

## 2019-01-01 RX ORDER — ACETAMINOPHEN 160 MG/5ML
650 SOLUTION ORAL EVERY 4 HOURS PRN
Status: DISCONTINUED | OUTPATIENT
Start: 2019-01-01 | End: 2019-01-01 | Stop reason: HOSPADM

## 2019-01-01 RX ORDER — METHYLPREDNISOLONE ACETATE 80 MG/ML
80 INJECTION, SUSPENSION INTRA-ARTICULAR; INTRALESIONAL; INTRAMUSCULAR; SOFT TISSUE ONCE
Status: COMPLETED | OUTPATIENT
Start: 2019-01-01 | End: 2019-01-01

## 2019-01-01 RX ORDER — LORAZEPAM 2 MG/ML
1 INJECTION INTRAMUSCULAR
Status: DISCONTINUED | OUTPATIENT
Start: 2019-01-01 | End: 2019-01-01 | Stop reason: HOSPADM

## 2019-01-01 RX ORDER — OXYCODONE AND ACETAMINOPHEN 10; 325 MG/1; MG/1
1 TABLET ORAL 4 TIMES DAILY PRN
Qty: 120 TABLET | Refills: 0 | Status: SHIPPED | OUTPATIENT
Start: 2019-01-01 | End: 2019-01-01 | Stop reason: SDUPTHER

## 2019-01-01 RX ORDER — FENTANYL CITRATE 50 UG/ML
50 INJECTION, SOLUTION INTRAMUSCULAR; INTRAVENOUS
Status: DISCONTINUED | OUTPATIENT
Start: 2019-01-01 | End: 2019-01-01 | Stop reason: HOSPADM

## 2019-01-01 RX ORDER — METHADONE HYDROCHLORIDE 10 MG/1
10 TABLET ORAL EVERY 8 HOURS PRN
Qty: 90 TABLET | Refills: 0
Start: 2019-01-01 | End: 2019-01-01

## 2019-01-01 RX ORDER — METHADONE HYDROCHLORIDE 10 MG/1
10 TABLET ORAL EVERY 8 HOURS PRN
Status: DISCONTINUED | OUTPATIENT
Start: 2019-01-01 | End: 2019-01-01 | Stop reason: HOSPADM

## 2019-01-01 RX ORDER — GABAPENTIN 400 MG/1
800 CAPSULE ORAL EVERY 6 HOURS
Status: DISCONTINUED | OUTPATIENT
Start: 2019-01-01 | End: 2019-01-01 | Stop reason: HOSPADM

## 2019-01-01 RX ORDER — ASPIRIN 325 MG
325 TABLET ORAL DAILY
Status: DISCONTINUED | OUTPATIENT
Start: 2019-01-01 | End: 2019-01-01

## 2019-01-01 RX ORDER — APIXABAN 5 MG/1
TABLET, FILM COATED ORAL
Qty: 60 TABLET | Refills: 5 | Status: ON HOLD | OUTPATIENT
Start: 2019-01-01 | End: 2019-01-01 | Stop reason: SDUPTHER

## 2019-01-01 RX ORDER — DEXTROSE MONOHYDRATE 25 G/50ML
12.5 INJECTION, SOLUTION INTRAVENOUS AS NEEDED
Status: DISCONTINUED | OUTPATIENT
Start: 2019-01-01 | End: 2019-01-01 | Stop reason: HOSPADM

## 2019-01-01 RX ORDER — SODIUM CHLORIDE 0.9 % (FLUSH) 0.9 %
3-10 SYRINGE (ML) INJECTION AS NEEDED
Status: CANCELLED | OUTPATIENT
Start: 2019-01-01

## 2019-01-01 RX ORDER — ATROPINE SULFATE 1 MG/ML
0.5 INJECTION, SOLUTION INTRAMUSCULAR; INTRAVENOUS; SUBCUTANEOUS ONCE AS NEEDED
Status: DISCONTINUED | OUTPATIENT
Start: 2019-01-01 | End: 2019-01-01 | Stop reason: HOSPADM

## 2019-01-01 RX ORDER — NALBUPHINE HCL 10 MG/ML
10 AMPUL (ML) INJECTION EVERY 4 HOURS PRN
Status: DISCONTINUED | OUTPATIENT
Start: 2019-01-01 | End: 2019-01-01 | Stop reason: HOSPADM

## 2019-01-01 RX ORDER — METHADONE HYDROCHLORIDE 10 MG/1
10 TABLET ORAL EVERY 8 HOURS PRN
Qty: 90 TABLET | Refills: 0 | Status: SHIPPED | OUTPATIENT
Start: 2019-01-01

## 2019-01-01 RX ORDER — POLYETHYLENE GLYCOL 3350 17 G/17G
17 POWDER, FOR SOLUTION ORAL DAILY
Status: DISCONTINUED | OUTPATIENT
Start: 2019-01-01 | End: 2019-01-01

## 2019-01-01 RX ORDER — POLYETHYLENE GLYCOL 3350 17 G/17G
17 POWDER, FOR SOLUTION ORAL DAILY
COMMUNITY
End: 2019-01-01

## 2019-01-01 RX ORDER — CLOPIDOGREL BISULFATE 75 MG/1
75 TABLET ORAL DAILY
Status: DISCONTINUED | OUTPATIENT
Start: 2019-01-01 | End: 2019-01-01

## 2019-01-01 RX ORDER — SODIUM CHLORIDE 0.9 % (FLUSH) 0.9 %
10 SYRINGE (ML) INJECTION EVERY 12 HOURS SCHEDULED
Status: DISCONTINUED | OUTPATIENT
Start: 2019-01-01 | End: 2019-01-01 | Stop reason: HOSPADM

## 2019-01-01 RX ORDER — SUCRALFATE 1 G/1
1 TABLET ORAL
Status: DISCONTINUED | OUTPATIENT
Start: 2019-01-01 | End: 2019-01-01 | Stop reason: HOSPADM

## 2019-01-01 RX ORDER — B12/LEVOMEFOLATE CALCIUM/B-6 2-1.13-25
1 TABLET ORAL DAILY
COMMUNITY
End: 2019-01-01

## 2019-01-01 RX ORDER — ALBUTEROL SULFATE 2.5 MG/3ML
2.5 SOLUTION RESPIRATORY (INHALATION) ONCE AS NEEDED
Status: DISCONTINUED | OUTPATIENT
Start: 2019-01-01 | End: 2019-01-01 | Stop reason: HOSPADM

## 2019-01-01 RX ORDER — NICOTINE 21 MG/24HR
1 PATCH, TRANSDERMAL 24 HOURS TRANSDERMAL EVERY 24 HOURS
Status: DISCONTINUED | OUTPATIENT
Start: 2019-01-01 | End: 2019-01-01 | Stop reason: HOSPADM

## 2019-01-01 RX ORDER — PANTOPRAZOLE SODIUM 40 MG/1
40 TABLET, DELAYED RELEASE ORAL 2 TIMES DAILY
Qty: 60 TABLET | Refills: 0 | Status: SHIPPED | OUTPATIENT
Start: 2019-01-01 | End: 2019-01-01

## 2019-01-01 RX ORDER — DIPHENHYDRAMINE HYDROCHLORIDE 50 MG/ML
12.5 INJECTION INTRAMUSCULAR; INTRAVENOUS
Status: DISCONTINUED | OUTPATIENT
Start: 2019-01-01 | End: 2019-01-01 | Stop reason: HOSPADM

## 2019-01-01 RX ORDER — SODIUM CHLORIDE 0.9 % (FLUSH) 0.9 %
3 SYRINGE (ML) INJECTION EVERY 12 HOURS SCHEDULED
Status: CANCELLED | OUTPATIENT
Start: 2019-01-01

## 2019-01-01 RX ORDER — PEG-3350, SODIUM SULFATE, SODIUM CHLORIDE, POTASSIUM CHLORIDE, SODIUM ASCORBATE AND ASCORBIC ACID 7.5-2.691G
1000 KIT ORAL
Status: COMPLETED | OUTPATIENT
Start: 2019-01-01 | End: 2019-01-01

## 2019-01-01 RX ORDER — ALBUTEROL SULFATE 90 UG/1
2 AEROSOL, METERED RESPIRATORY (INHALATION) EVERY 6 HOURS PRN
Qty: 8.5 G | Refills: 5 | Status: SHIPPED | OUTPATIENT
Start: 2019-01-01

## 2019-01-01 RX ORDER — OXYCODONE AND ACETAMINOPHEN 10; 325 MG/1; MG/1
1 TABLET ORAL 4 TIMES DAILY PRN
Qty: 120 TABLET | Refills: 0
Start: 2019-01-01 | End: 2019-01-01 | Stop reason: SDUPTHER

## 2019-01-01 RX ORDER — SODIUM CHLORIDE, SODIUM LACTATE, POTASSIUM CHLORIDE, CALCIUM CHLORIDE 600; 310; 30; 20 MG/100ML; MG/100ML; MG/100ML; MG/100ML
1000 INJECTION, SOLUTION INTRAVENOUS CONTINUOUS
Status: DISCONTINUED | OUTPATIENT
Start: 2019-01-01 | End: 2019-01-01 | Stop reason: HOSPADM

## 2019-01-01 RX ORDER — GLYCOPYRROLATE 1 MG/5 ML
SYRINGE (ML) INTRAVENOUS AS NEEDED
Status: DISCONTINUED | OUTPATIENT
Start: 2019-01-01 | End: 2019-01-01 | Stop reason: SURG

## 2019-01-01 RX ORDER — PROMETHAZINE HYDROCHLORIDE 25 MG/1
25 TABLET ORAL ONCE AS NEEDED
Status: DISCONTINUED | OUTPATIENT
Start: 2019-01-01 | End: 2019-01-01 | Stop reason: HOSPADM

## 2019-01-01 RX ORDER — DULOXETIN HYDROCHLORIDE 60 MG/1
60 CAPSULE, DELAYED RELEASE ORAL DAILY
COMMUNITY
End: 2019-01-01

## 2019-01-01 RX ORDER — ACETAMINOPHEN 650 MG/1
650 SUPPOSITORY RECTAL EVERY 4 HOURS PRN
Status: DISCONTINUED | OUTPATIENT
Start: 2019-01-01 | End: 2019-01-01 | Stop reason: HOSPADM

## 2019-01-01 RX ORDER — POTASSIUM CHLORIDE 20 MEQ/1
TABLET, EXTENDED RELEASE ORAL
Qty: 60 TABLET | Refills: 5 | Status: SHIPPED | OUTPATIENT
Start: 2019-01-01 | End: 2019-01-01

## 2019-01-01 RX ORDER — GABAPENTIN 300 MG/1
300 CAPSULE ORAL 4 TIMES DAILY
COMMUNITY

## 2019-01-01 RX ORDER — SODIUM CHLORIDE 0.9 % (FLUSH) 0.9 %
3-10 SYRINGE (ML) INJECTION AS NEEDED
Status: DISCONTINUED | OUTPATIENT
Start: 2019-01-01 | End: 2019-01-01 | Stop reason: HOSPADM

## 2019-01-01 RX ORDER — ONDANSETRON 2 MG/ML
4 INJECTION INTRAMUSCULAR; INTRAVENOUS EVERY 6 HOURS PRN
Status: DISCONTINUED | OUTPATIENT
Start: 2019-01-01 | End: 2019-01-01

## 2019-01-01 RX ORDER — SODIUM CHLORIDE 9 MG/ML
9 INJECTION, SOLUTION INTRAVENOUS CONTINUOUS PRN
Status: DISCONTINUED | OUTPATIENT
Start: 2019-01-01 | End: 2019-01-01 | Stop reason: HOSPADM

## 2019-01-01 RX ORDER — METHADONE HYDROCHLORIDE 10 MG/1
10 TABLET ORAL EVERY 8 HOURS PRN
Qty: 90 TABLET | Refills: 0
Start: 2019-01-01 | End: 2019-01-01 | Stop reason: SDUPTHER

## 2019-01-01 RX ORDER — OXYCODONE HYDROCHLORIDE 5 MG/1
10 TABLET ORAL EVERY 4 HOURS PRN
Status: DISCONTINUED | OUTPATIENT
Start: 2019-01-01 | End: 2019-01-01 | Stop reason: HOSPADM

## 2019-01-01 RX ORDER — OXYCODONE HYDROCHLORIDE 5 MG/1
7.5 TABLET ORAL ONCE AS NEEDED
Status: COMPLETED | OUTPATIENT
Start: 2019-01-01 | End: 2019-01-01

## 2019-01-01 RX ORDER — NALBUPHINE HCL 10 MG/ML
2 AMPUL (ML) INJECTION EVERY 4 HOURS PRN
Status: DISCONTINUED | OUTPATIENT
Start: 2019-01-01 | End: 2019-01-01 | Stop reason: HOSPADM

## 2019-01-01 RX ORDER — PANTOPRAZOLE SODIUM 40 MG/1
40 TABLET, DELAYED RELEASE ORAL
Status: DISCONTINUED | OUTPATIENT
Start: 2019-01-01 | End: 2019-01-01

## 2019-01-01 RX ORDER — OXYCODONE AND ACETAMINOPHEN 10; 325 MG/1; MG/1
1 TABLET ORAL 4 TIMES DAILY PRN
Qty: 120 TABLET | Refills: 0 | Status: SHIPPED | OUTPATIENT
Start: 2019-01-01 | End: 2020-01-01

## 2019-01-01 RX ORDER — FENTANYL CITRATE 50 UG/ML
INJECTION, SOLUTION INTRAMUSCULAR; INTRAVENOUS AS NEEDED
Status: DISCONTINUED | OUTPATIENT
Start: 2019-01-01 | End: 2019-01-01 | Stop reason: SURG

## 2019-01-01 RX ORDER — FENTANYL CITRATE 50 UG/ML
25 INJECTION, SOLUTION INTRAMUSCULAR; INTRAVENOUS
Status: DISCONTINUED | OUTPATIENT
Start: 2019-01-01 | End: 2019-01-01 | Stop reason: HOSPADM

## 2019-01-01 RX ORDER — CLOPIDOGREL BISULFATE 75 MG/1
TABLET ORAL
Qty: 30 TABLET | Refills: 5 | Status: SHIPPED | OUTPATIENT
Start: 2019-01-01 | End: 2019-01-01

## 2019-01-01 RX ORDER — EPHEDRINE SULFATE 50 MG/ML
5 INJECTION, SOLUTION INTRAVENOUS ONCE AS NEEDED
Status: DISCONTINUED | OUTPATIENT
Start: 2019-01-01 | End: 2019-01-01 | Stop reason: HOSPADM

## 2019-01-01 RX ORDER — SODIUM CHLORIDE 0.9 % (FLUSH) 0.9 %
10 SYRINGE (ML) INJECTION AS NEEDED
Status: DISCONTINUED | OUTPATIENT
Start: 2019-01-01 | End: 2019-01-01 | Stop reason: HOSPADM

## 2019-01-01 RX ORDER — SERTRALINE HYDROCHLORIDE 100 MG/1
200 TABLET, FILM COATED ORAL
Qty: 60 TABLET | Refills: 12 | Status: SHIPPED | OUTPATIENT
Start: 2019-01-01

## 2019-01-01 RX ORDER — CHOLECALCIFEROL (VITAMIN D3) 125 MCG
5 CAPSULE ORAL NIGHTLY PRN
Status: DISCONTINUED | OUTPATIENT
Start: 2019-01-01 | End: 2019-01-01 | Stop reason: HOSPADM

## 2019-01-01 RX ORDER — SODIUM CHLORIDE 0.9 % (FLUSH) 0.9 %
3 SYRINGE (ML) INJECTION EVERY 12 HOURS SCHEDULED
Status: DISCONTINUED | OUTPATIENT
Start: 2019-01-01 | End: 2019-01-01 | Stop reason: HOSPADM

## 2019-01-01 RX ORDER — RALOXIFENE HYDROCHLORIDE 60 MG/1
TABLET, FILM COATED ORAL
Qty: 30 TABLET | Refills: 5 | Status: SHIPPED | OUTPATIENT
Start: 2019-01-01 | End: 2019-01-01

## 2019-01-01 RX ORDER — PHENYLEPHRINE HCL IN 0.9% NACL 0.5 MG/5ML
.5-3 SYRINGE (ML) INTRAVENOUS
Status: DISCONTINUED | OUTPATIENT
Start: 2019-01-01 | End: 2019-01-01 | Stop reason: HOSPADM

## 2019-01-01 RX ORDER — DULOXETIN HYDROCHLORIDE 60 MG/1
60 CAPSULE, DELAYED RELEASE ORAL 2 TIMES DAILY
Qty: 30 CAPSULE | Refills: 5 | Status: SHIPPED | OUTPATIENT
Start: 2019-01-01

## 2019-01-01 RX ORDER — POTASSIUM CHLORIDE 20 MEQ/1
40 TABLET, EXTENDED RELEASE ORAL AS NEEDED
Status: CANCELLED | OUTPATIENT
Start: 2019-01-01

## 2019-01-01 RX ORDER — POTASSIUM CHLORIDE 1.5 G/1.77G
40 POWDER, FOR SOLUTION ORAL AS NEEDED
Status: CANCELLED | OUTPATIENT
Start: 2019-01-01

## 2019-01-01 RX ORDER — POTASSIUM CHLORIDE 1.5 G/1.77G
40 POWDER, FOR SOLUTION ORAL AS NEEDED
Status: DISCONTINUED | OUTPATIENT
Start: 2019-01-01 | End: 2019-01-01 | Stop reason: HOSPADM

## 2019-01-01 RX ORDER — HYDROMORPHONE HCL 110MG/55ML
0.25 PATIENT CONTROLLED ANALGESIA SYRINGE INTRAVENOUS
Status: DISCONTINUED | OUTPATIENT
Start: 2019-01-01 | End: 2019-01-01 | Stop reason: HOSPADM

## 2019-01-01 RX ORDER — GABAPENTIN 800 MG/1
TABLET ORAL
Qty: 120 TABLET | Refills: 12 | Status: SHIPPED | OUTPATIENT
Start: 2019-01-01 | End: 2019-01-01

## 2019-01-01 RX ORDER — LIDOCAINE HYDROCHLORIDE 10 MG/ML
0.5 INJECTION, SOLUTION INFILTRATION; PERINEURAL ONCE AS NEEDED
Status: DISCONTINUED | OUTPATIENT
Start: 2019-01-01 | End: 2019-01-01 | Stop reason: HOSPADM

## 2019-01-01 RX ADMIN — DULOXETINE 60 MG: 30 CAPSULE, DELAYED RELEASE ORAL at 14:00

## 2019-01-01 RX ADMIN — METHADONE HYDROCHLORIDE 10 MG: 10 TABLET ORAL at 14:28

## 2019-01-01 RX ADMIN — FERRIC CARBOXYMALTOSE INJECTION 750 MG: 50 INJECTION, SOLUTION INTRAVENOUS at 11:33

## 2019-01-01 RX ADMIN — FENTANYL CITRATE 25 MCG: 50 INJECTION, SOLUTION INTRAMUSCULAR; INTRAVENOUS at 08:35

## 2019-01-01 RX ADMIN — FENTANYL CITRATE 25 MCG: 50 INJECTION, SOLUTION INTRAMUSCULAR; INTRAVENOUS at 08:12

## 2019-01-01 RX ADMIN — PROPOFOL 20 MG: 10 INJECTION, EMULSION INTRAVENOUS at 12:50

## 2019-01-01 RX ADMIN — SODIUM CHLORIDE: 0.9 INJECTION, SOLUTION INTRAVENOUS at 12:29

## 2019-01-01 RX ADMIN — SODIUM CHLORIDE 250 ML: 0.9 INJECTION, SOLUTION INTRAVENOUS at 11:00

## 2019-01-01 RX ADMIN — FENTANYL CITRATE 50 MCG: 50 INJECTION, SOLUTION INTRAMUSCULAR; INTRAVENOUS at 07:39

## 2019-01-01 RX ADMIN — LABETALOL 20 MG/4 ML (5 MG/ML) INTRAVENOUS SYRINGE 5 MG: at 08:53

## 2019-01-01 RX ADMIN — PROPOFOL 20 MG: 10 INJECTION, EMULSION INTRAVENOUS at 13:05

## 2019-01-01 RX ADMIN — PROPOFOL 20 MG: 10 INJECTION, EMULSION INTRAVENOUS at 12:46

## 2019-01-01 RX ADMIN — CEFAZOLIN SODIUM 2 G: 1 INJECTION, POWDER, FOR SOLUTION INTRAMUSCULAR; INTRAVENOUS at 12:56

## 2019-01-01 RX ADMIN — IOPAMIDOL 50 ML: 755 INJECTION, SOLUTION INTRAVENOUS at 12:00

## 2019-01-01 RX ADMIN — PROPOFOL 20 MG: 10 INJECTION, EMULSION INTRAVENOUS at 12:42

## 2019-01-01 RX ADMIN — POTASSIUM CHLORIDE 40 MEQ: 1500 TABLET, EXTENDED RELEASE ORAL at 08:44

## 2019-01-01 RX ADMIN — PROPOFOL 20 MG: 10 INJECTION, EMULSION INTRAVENOUS at 12:52

## 2019-01-01 RX ADMIN — PROPOFOL 20 MG: 10 INJECTION, EMULSION INTRAVENOUS at 13:10

## 2019-01-01 RX ADMIN — PROPOFOL 150 MG: 10 INJECTION, EMULSION INTRAVENOUS at 07:39

## 2019-01-01 RX ADMIN — REGADENOSON 0.4 MG: 0.08 INJECTION, SOLUTION INTRAVENOUS at 13:30

## 2019-01-01 RX ADMIN — PROPOFOL 20 MG: 10 INJECTION, EMULSION INTRAVENOUS at 13:15

## 2019-01-01 RX ADMIN — ROCURONIUM BROMIDE 40 MG: 10 INJECTION, SOLUTION INTRAVENOUS at 07:40

## 2019-01-01 RX ADMIN — SODIUM CHLORIDE 9 ML/HR: 900 INJECTION, SOLUTION INTRAVENOUS at 11:09

## 2019-01-01 RX ADMIN — PROPOFOL 60 MG: 10 INJECTION, EMULSION INTRAVENOUS at 12:34

## 2019-01-01 RX ADMIN — Medication 3 MG: at 08:29

## 2019-01-01 RX ADMIN — Medication 10 ML: at 21:04

## 2019-01-01 RX ADMIN — HYDROMORPHONE HYDROCHLORIDE 0.5 MG: 2 INJECTION, SOLUTION INTRAMUSCULAR; INTRAVENOUS; SUBCUTANEOUS at 13:59

## 2019-01-01 RX ADMIN — CYANOCOBALAMIN TAB 1000 MCG 1000 MCG: 1000 TAB at 14:00

## 2019-01-01 RX ADMIN — POLYETHYLENE GLYCOL 3350, SODIUM SULFATE, SODIUM CHLORIDE, POTASSIUM CHLORIDE, ASCORBIC ACID, SODIUM ASCORBATE 1000 ML: KIT at 21:02

## 2019-01-01 RX ADMIN — PROPOFOL 20 MG: 10 INJECTION, EMULSION INTRAVENOUS at 12:44

## 2019-01-01 RX ADMIN — SODIUM CHLORIDE, SODIUM LACTATE, POTASSIUM CHLORIDE, AND CALCIUM CHLORIDE 1000 ML: 600; 310; 30; 20 INJECTION, SOLUTION INTRAVENOUS at 07:17

## 2019-01-01 RX ADMIN — LIDOCAINE HYDROCHLORIDE 50 MG: 10 INJECTION, SOLUTION EPIDURAL; INFILTRATION; INTRACAUDAL; PERINEURAL at 12:53

## 2019-01-01 RX ADMIN — PROPOFOL 20 MG: 10 INJECTION, EMULSION INTRAVENOUS at 12:36

## 2019-01-01 RX ADMIN — METHADONE HYDROCHLORIDE 10 MG: 10 TABLET ORAL at 21:32

## 2019-01-01 RX ADMIN — POLYETHYLENE GLYCOL 3350, SODIUM SULFATE, SODIUM CHLORIDE, POTASSIUM CHLORIDE, ASCORBIC ACID, SODIUM ASCORBATE 1000 ML: KIT at 22:31

## 2019-01-01 RX ADMIN — POTASSIUM CHLORIDE 40 MEQ: 1500 TABLET, EXTENDED RELEASE ORAL at 14:00

## 2019-01-01 RX ADMIN — ROSUVASTATIN CALCIUM 10 MG: 10 TABLET, FILM COATED ORAL at 14:00

## 2019-01-01 RX ADMIN — CEFAZOLIN SODIUM 2 G: 1 INJECTION, POWDER, FOR SOLUTION INTRAMUSCULAR; INTRAVENOUS at 08:03

## 2019-01-01 RX ADMIN — Medication 0.6 MG: at 08:29

## 2019-01-01 RX ADMIN — PROPOFOL 70 MG: 10 INJECTION, EMULSION INTRAVENOUS at 12:53

## 2019-01-01 RX ADMIN — POTASSIUM CHLORIDE 20 MEQ: 1500 TABLET, EXTENDED RELEASE ORAL at 21:03

## 2019-01-01 RX ADMIN — METOCLOPRAMIDE 10 MG: 5 INJECTION, SOLUTION INTRAMUSCULAR; INTRAVENOUS at 05:02

## 2019-01-01 RX ADMIN — GABAPENTIN 800 MG: 400 CAPSULE ORAL at 06:25

## 2019-01-01 RX ADMIN — LIDOCAINE HYDROCHLORIDE 50 MG: 20 INJECTION, SOLUTION EPIDURAL; INFILTRATION; INTRACAUDAL; PERINEURAL at 07:39

## 2019-01-01 RX ADMIN — PROPOFOL 20 MG: 10 INJECTION, EMULSION INTRAVENOUS at 12:38

## 2019-01-01 RX ADMIN — FERRIC CARBOXYMALTOSE INJECTION 750 MG: 50 INJECTION, SOLUTION INTRAVENOUS at 10:53

## 2019-01-01 RX ADMIN — OXYCODONE HYDROCHLORIDE 7.5 MG: 5 TABLET ORAL at 14:11

## 2019-01-01 RX ADMIN — FLUDEOXYGLUCOSE F18 1 DOSE: 300 INJECTION INTRAVENOUS at 12:05

## 2019-01-01 RX ADMIN — LIDOCAINE HYDROCHLORIDE 50 MG: 10 INJECTION, SOLUTION EPIDURAL; INFILTRATION; INTRACAUDAL; PERINEURAL at 12:34

## 2019-01-01 RX ADMIN — PANTOPRAZOLE SODIUM 40 MG: 40 TABLET, DELAYED RELEASE ORAL at 06:24

## 2019-01-01 RX ADMIN — PROPOFOL 20 MG: 10 INJECTION, EMULSION INTRAVENOUS at 12:48

## 2019-01-01 RX ADMIN — OXYCODONE HYDROCHLORIDE 10 MG: 5 TABLET ORAL at 21:18

## 2019-01-01 RX ADMIN — PROPOFOL 80 MCG/KG/MIN: 10 INJECTION, EMULSION INTRAVENOUS at 12:53

## 2019-01-01 RX ADMIN — HYDROMORPHONE HYDROCHLORIDE 0.5 MG: 2 INJECTION, SOLUTION INTRAMUSCULAR; INTRAVENOUS; SUBCUTANEOUS at 13:47

## 2019-01-01 RX ADMIN — METHYLPREDNISOLONE ACETATE 80 MG: 80 INJECTION, SUSPENSION INTRA-ARTICULAR; INTRALESIONAL; INTRAMUSCULAR; SOFT TISSUE at 10:04

## 2019-01-01 RX ADMIN — GABAPENTIN 800 MG: 400 CAPSULE ORAL at 19:17

## 2019-01-01 RX ADMIN — TECHNETIUM TC 99M SESTAMIBI 1 DOSE: 1 INJECTION INTRAVENOUS at 12:45

## 2019-01-01 RX ADMIN — HYDROMORPHONE HYDROCHLORIDE 0.5 MG: 2 INJECTION, SOLUTION INTRAMUSCULAR; INTRAVENOUS; SUBCUTANEOUS at 09:04

## 2019-01-01 RX ADMIN — DOCUSATE SODIUM 50 MG: 50 CAPSULE, LIQUID FILLED ORAL at 21:03

## 2019-01-01 RX ADMIN — METOCLOPRAMIDE 10 MG: 5 INJECTION, SOLUTION INTRAMUSCULAR; INTRAVENOUS at 21:13

## 2019-01-01 RX ADMIN — PROPOFOL 20 MG: 10 INJECTION, EMULSION INTRAVENOUS at 12:40

## 2019-01-01 RX ADMIN — PROPOFOL 30 MG: 10 INJECTION, EMULSION INTRAVENOUS at 13:04

## 2019-01-01 RX ADMIN — GABAPENTIN 800 MG: 400 CAPSULE ORAL at 00:14

## 2019-01-07 ENCOUNTER — OFFICE (AMBULATORY)
Dept: URBAN - METROPOLITAN AREA INFUSION 3 | Facility: INFUSION | Age: 64
End: 2019-01-07

## 2019-01-07 VITALS
HEART RATE: 77 BPM | DIASTOLIC BLOOD PRESSURE: 57 MMHG | HEART RATE: 74 BPM | RESPIRATION RATE: 16 BRPM | SYSTOLIC BLOOD PRESSURE: 103 MMHG | SYSTOLIC BLOOD PRESSURE: 93 MMHG | HEART RATE: 69 BPM | DIASTOLIC BLOOD PRESSURE: 64 MMHG | HEIGHT: 65 IN | SYSTOLIC BLOOD PRESSURE: 107 MMHG | TEMPERATURE: 98.7 F

## 2019-01-07 DIAGNOSIS — D50.9 IRON DEFICIENCY ANEMIA, UNSPECIFIED: ICD-10-CM

## 2019-01-07 DIAGNOSIS — K90.9 INTESTINAL MALABSORPTION, UNSPECIFIED: ICD-10-CM

## 2019-01-07 PROCEDURE — 96365 THER/PROPH/DIAG IV INF INIT: CPT | Performed by: INTERNAL MEDICINE

## 2019-01-14 ENCOUNTER — OFFICE (AMBULATORY)
Dept: URBAN - METROPOLITAN AREA CLINIC 64 | Facility: CLINIC | Age: 64
End: 2019-01-14

## 2019-01-14 ENCOUNTER — OFFICE (AMBULATORY)
Dept: URBAN - METROPOLITAN AREA INFUSION 3 | Facility: INFUSION | Age: 64
End: 2019-01-14

## 2019-01-14 VITALS
RESPIRATION RATE: 18 BRPM | TEMPERATURE: 98 F | DIASTOLIC BLOOD PRESSURE: 69 MMHG | DIASTOLIC BLOOD PRESSURE: 67 MMHG | HEART RATE: 69 BPM | RESPIRATION RATE: 16 BRPM | SYSTOLIC BLOOD PRESSURE: 107 MMHG | HEART RATE: 70 BPM | SYSTOLIC BLOOD PRESSURE: 134 MMHG | TEMPERATURE: 97.5 F | HEIGHT: 65 IN | SYSTOLIC BLOOD PRESSURE: 113 MMHG | SYSTOLIC BLOOD PRESSURE: 108 MMHG | DIASTOLIC BLOOD PRESSURE: 65 MMHG | TEMPERATURE: 97.7 F | HEART RATE: 74 BPM | DIASTOLIC BLOOD PRESSURE: 74 MMHG

## 2019-01-14 VITALS
SYSTOLIC BLOOD PRESSURE: 101 MMHG | WEIGHT: 190 LBS | DIASTOLIC BLOOD PRESSURE: 62 MMHG | HEART RATE: 81 BPM | HEIGHT: 65 IN

## 2019-01-14 DIAGNOSIS — K90.9 INTESTINAL MALABSORPTION, UNSPECIFIED: ICD-10-CM

## 2019-01-14 DIAGNOSIS — D50.9 IRON DEFICIENCY ANEMIA, UNSPECIFIED: ICD-10-CM

## 2019-01-14 DIAGNOSIS — K92.1 MELENA: ICD-10-CM

## 2019-01-14 PROCEDURE — 99213 OFFICE O/P EST LOW 20 MIN: CPT | Performed by: NURSE PRACTITIONER

## 2019-01-14 PROCEDURE — 96365 THER/PROPH/DIAG IV INF INIT: CPT | Performed by: NURSE PRACTITIONER

## 2019-06-21 ENCOUNTER — OFFICE (AMBULATORY)
Dept: URBAN - METROPOLITAN AREA CLINIC 64 | Facility: CLINIC | Age: 64
End: 2019-06-21

## 2019-06-21 VITALS
HEIGHT: 65 IN | WEIGHT: 194 LBS | DIASTOLIC BLOOD PRESSURE: 79 MMHG | HEART RATE: 82 BPM | SYSTOLIC BLOOD PRESSURE: 133 MMHG

## 2019-06-21 DIAGNOSIS — D50.9 IRON DEFICIENCY ANEMIA, UNSPECIFIED: ICD-10-CM

## 2019-06-21 PROBLEM — R92.8 ABNORMAL MAMMOGRAM: Status: ACTIVE | Noted: 2019-01-01

## 2019-06-21 PROBLEM — E78.5 HYPERLIPIDEMIA: Status: ACTIVE | Noted: 2019-01-01

## 2019-06-21 PROBLEM — K58.9 IRRITABLE BOWEL SYNDROME WITHOUT DIARRHEA: Status: ACTIVE | Noted: 2019-01-01

## 2019-06-21 PROBLEM — R26.81 UNSTEADY GAIT: Status: ACTIVE | Noted: 2019-01-01

## 2019-06-21 PROBLEM — E87.6 HYPOKALEMIA: Status: ACTIVE | Noted: 2019-01-01

## 2019-06-21 PROBLEM — F34.89 OTHER SPECIFIED PERSISTENT MOOD DISORDERS: Status: ACTIVE | Noted: 2019-01-01

## 2019-06-21 PROBLEM — Z98.890 OTHER POSTPROCEDURAL STATES: Status: ACTIVE | Noted: 2019-01-01

## 2019-06-21 PROBLEM — M19.011 DEGENERATIVE JOINT DISEASE OF RIGHT ACROMIOCLAVICULAR JOINT: Status: ACTIVE | Noted: 2017-10-16

## 2019-06-21 PROBLEM — M79.2 NEURALGIA: Status: ACTIVE | Noted: 2019-01-01

## 2019-06-21 PROBLEM — C73 MALIGNANT NEOPLASM OF THYROID GLAND (HCC): Status: ACTIVE | Noted: 2019-01-01

## 2019-06-21 PROBLEM — G62.9 NEUROPATHY: Status: ACTIVE | Noted: 2019-01-01

## 2019-06-21 PROBLEM — R25.1 TREMOR: Status: ACTIVE | Noted: 2019-01-01

## 2019-06-21 PROBLEM — M19.90 OSTEOARTHRITIS: Status: ACTIVE | Noted: 2019-01-01

## 2019-06-21 PROBLEM — R53.83 MALAISE AND FATIGUE: Status: ACTIVE | Noted: 2019-01-01

## 2019-06-21 PROBLEM — M25.50 MULTIPLE JOINT PAIN: Status: ACTIVE | Noted: 2019-01-01

## 2019-06-21 PROBLEM — K44.9 DIAPHRAGMATIC HERNIA: Status: ACTIVE | Noted: 2019-01-01

## 2019-06-21 PROBLEM — M84.40XA MULTIPLE PATHOLOGICAL FRACTURES: Status: ACTIVE | Noted: 2019-01-01

## 2019-06-21 PROBLEM — N63.20 BREAST MASS, LEFT: Status: ACTIVE | Noted: 2019-01-01

## 2019-06-21 PROBLEM — D64.9 ANEMIA: Status: ACTIVE | Noted: 2019-01-01

## 2019-06-21 PROBLEM — I65.22 OCCLUSION OF LEFT CAROTID ARTERY: Status: ACTIVE | Noted: 2019-01-01

## 2019-06-21 PROBLEM — K13.0 ANGULAR CHEILITIS: Status: ACTIVE | Noted: 2019-01-01

## 2019-06-21 PROBLEM — L03.113 CELLULITIS OF RIGHT UPPER EXTREMITY: Status: ACTIVE | Noted: 2019-01-01

## 2019-06-21 PROBLEM — K21.9 GASTROESOPHAGEAL REFLUX DISEASE: Status: ACTIVE | Noted: 2019-01-01

## 2019-06-21 PROBLEM — R53.81 MALAISE AND FATIGUE: Status: ACTIVE | Noted: 2019-01-01

## 2019-06-21 PROBLEM — K29.71 GASTRITIS WITH HEMORRHAGE: Status: ACTIVE | Noted: 2019-01-01

## 2019-06-21 PROBLEM — R60.9 EDEMA: Status: ACTIVE | Noted: 2019-01-01

## 2019-06-21 PROCEDURE — 99214 OFFICE O/P EST MOD 30 MIN: CPT | Performed by: NURSE PRACTITIONER

## 2019-06-27 NOTE — PROGRESS NOTES
Subjective   Madalyn Navas is a 63 y.o. female.     Med Refill. Pt also needs recliner for comfort. Unable to lie flat due to chronic pain and GI issues.       Back Pain   This is a chronic problem. The current episode started more than 1 year ago. The problem occurs constantly. The problem has been rapidly worsening since onset. The pain is present in the lumbar spine. The quality of the pain is described as stabbing and shooting. The pain radiates to the left thigh. The pain is severe. Pertinent negatives include no abdominal pain, chest pain, dysuria, fever, numbness or weakness. The treatment provided significant (Pain Medication) relief.        The following portions of the patient's history were reviewed and updated as appropriate: allergies, current medications, past family history, past medical history, past social history, past surgical history and problem list.    Patient Active Problem List   Diagnosis   • Abnormal mammogram   • Anemia   • Angular cheilitis   • Breast mass, left   • Cerebral infarction due to unspecified occlusion or stenosis of unspecified carotid artery (CMS/HCC)   • Spinal stenosis of cervical region   • Degenerative joint disease of right acromioclavicular joint   • Cellulitis of right upper extremity   • Diaphragmatic hernia   • Edema   • Extrapyramidal and movement disorders in diseases classified elsewhere   • Fusion of lumbar spine   • Gastritis with hemorrhage   • Gastroesophageal reflux disease   • H/O total hysterectomy with bilateral salpingo-oophorectomy (BSO)   • History of Nissen fundoplication   • Hyperlipidemia   • Hypokalemia   • Malaise and fatigue   • Irritable bowel syndrome without diarrhea   • Laryngeal cancer (CMS/HCC)   • Malignant neoplasm of thyroid gland (CMS/HCC)   • Menopausal syndrome   • Multiple pathological fractures   • Neuralgia   • Neuropathy   • Occlusion of left carotid artery   • Osteoarthritis   • Other cervical disc degeneration, unspecified  cervical region   • Iron deficiency anemia   • Other postprocedural states   • Other specified persistent mood disorders (CMS/HCC)   • Multiple joint pain   • Tremor   • Unsteady gait       Current Outpatient Medications on File Prior to Visit   Medication Sig Dispense Refill   • albuterol sulfate HFA (PROAIR HFA) 108 (90 Base) MCG/ACT inhaler Inhale 2 puffs Daily.     • apixaban (ELIQUIS) 5 MG tablet tablet Take 1 tablet by mouth 2 (Two) Times a Day.     • aspirin 325 MG tablet Take 1 tablet by mouth Daily.     • Calcium Carbonate-Vitamin D (CALCIUM 500 + D) 500-125 MG-UNIT tablet Take 1 tablet by mouth Daily.     • clopidogrel (PLAVIX) 75 MG tablet Take 1 tablet by mouth Daily.     • cyanocobalamin (VITAMIN B-12) 500 MCG tablet Take 1 tablet by mouth 2 (Two) Times a Day.     • docusate sodium (COLACE) 50 MG capsule Take 1 capsule by mouth 2 (Two) Times a Day.     • DULoxetine (CYMBALTA) 60 MG capsule Take 1 capsule by mouth Daily.     • gabapentin (NEURONTIN) 800 MG tablet Take 1 tablet by mouth 4 (Four) Times a Day.     • hydrochlorothiazide (HYDRODIURIL) 50 MG tablet Take 1 tablet by mouth Daily.     • linaclotide (LINZESS) 290 MCG capsule capsule Take 1 capsule by mouth Daily.     • methadone (DOLOPHINE) 10 MG tablet Take 1 tablet by mouth Daily,     • nystatin-triamcinolone (MYCOLOG II) 802570-5.1 UNIT/GM-% cream      • oxyCODONE-acetaminophen (PERCOCET)  MG per tablet Take 1 tablet by mouth 4 (Four) Times a Day As Needed.     • pantoprazole (PROTONIX) 40 MG EC tablet Take 1 tablet by mouth Daily.     • rOPINIRole (REQUIP) 4 MG tablet Take 1 tablet by mouth 3 (Three) Times a Day As Needed.     • rosuvastatin (CRESTOR) 10 MG tablet Take 1 tablet by mouth Daily.     • sertraline (ZOLOFT) 100 MG tablet Take 1 tablet by mouth every night at bedtime.     • ferrous sulfate 325 (65 FE) MG tablet Take 1 tablet by mouth 2 (Two) Times a Day.     • Polyethylene Glycol 3350 (MIRALAX PO) Take 1 packet by mouth Daily.      • potassium chloride (KLOR-CON) 20 MEQ CR tablet Take 1 tablet by mouth 2 (Two) Times a Day.     • raloxifene (EVISTA) 60 MG tablet Take 1 tablet by mouth Daily.       Current Facility-Administered Medications on File Prior to Visit   Medication Dose Route Frequency Provider Last Rate Last Dose   • [DISCONTINUED] ferric carboxymaltose (INJECTAFER) 750 mg in sodium chloride 0.9 % 100 mL IVPB  750 mg Intravenous Weekly Carina Jorge APRN   Stopped at 06/25/19 1132       Allergies   Allergen Reactions   • Meperidine Other (See Comments)       Review of Systems   Constitutional: Negative for activity change, appetite change, fatigue and fever.   HENT: Negative for ear pain and voice change.    Eyes: Negative for visual disturbance.   Respiratory: Negative for shortness of breath and wheezing.    Cardiovascular: Negative for chest pain and leg swelling.   Gastrointestinal: Negative for abdominal pain, blood in stool, constipation, diarrhea, nausea and vomiting.   Endocrine: Negative for polydipsia and polyuria.   Genitourinary: Negative for dysuria, frequency and hematuria.   Musculoskeletal: Positive for arthralgias, back pain, gait problem, joint swelling and myalgias. Negative for neck stiffness.   Skin: Negative for rash and bruise.   Neurological: Negative for weakness, numbness and headache.   Psychiatric/Behavioral: Negative for suicidal ideas and depressed mood.       Objective   Physical Exam   Constitutional: She is oriented to person, place, and time. She appears well-developed and well-nourished.   HENT:   Head: Normocephalic and atraumatic.   Left Ear: External ear normal.   Nose: Nose normal.   Mouth/Throat: Oropharynx is clear and moist.   Eyes: EOM are normal. Pupils are equal, round, and reactive to light.   Neck: Normal range of motion. Neck supple.   Cardiovascular: Normal rate, regular rhythm and normal heart sounds.   Pulmonary/Chest: Effort normal.   Abdominal: Soft. Bowel sounds are normal.    Musculoskeletal:   Uses cane   Neurological: She is alert and oriented to person, place, and time.   Skin: Skin is warm and dry.   Psychiatric: She has a normal mood and affect. Her behavior is normal. Judgment and thought content normal.         Assessment/Plan   Problem List Items Addressed This Visit        Respiratory    Laryngeal cancer (CMS/HCC)       Endocrine    Malignant neoplasm of thyroid gland (CMS/HCC)       Nervous and Auditory    Multiple joint pain    Overview     fibromyalgia            Musculoskeletal and Integument    Multiple pathological fractures    Overview     dexa, calcium, mag normal. Recc ortho eval         Osteoarthritis - Primary    Other cervical disc degeneration, unspecified cervical region       Other    Spinal stenosis of cervical region    Other specified persistent mood disorders (CMS/HCC)    Overview     worsening. Try zoloft. Follow-up in one month, sooner for concerns. Watch for serotonin sx given concurrent cymbalta  anxiety with panic attack         Unsteady gait          Rx for recliner written. Findings discussed. All questions answered.  Medication and medication adverse effects discussed.  Drug education given and explained to patient. Patient verbalized understanding.  Follow up in 1 month for reevaluation, sooner for concerns.

## 2019-07-02 PROBLEM — M79.602 PAIN OF LEFT UPPER EXTREMITY: Status: ACTIVE | Noted: 2019-01-01

## 2019-07-02 NOTE — PROGRESS NOTES
Subjective   Madalyn Navas is a 63 y.o. female.     Arm swollen from elbow to forearm. No trauma. Warm, swelled.       Arm Pain    The incident occurred 3 to 5 days ago (3 days). The pain is present in the left elbow and left forearm. The quality of the pain is described as burning. The pain has been constant since the incident. Pertinent negatives include no chest pain or numbness.        The following portions of the patient's history were reviewed and updated as appropriate: allergies, current medications, past family history, past medical history, past social history, past surgical history and problem list.    Patient Active Problem List   Diagnosis   • Abnormal mammogram   • Anemia   • Angular cheilitis   • Breast mass, left   • Cerebral infarction due to unspecified occlusion or stenosis of unspecified carotid artery (CMS/HCC)   • Spinal stenosis of cervical region   • Degenerative joint disease of right acromioclavicular joint   • Cellulitis of right upper extremity   • Diaphragmatic hernia   • Edema   • Extrapyramidal and movement disorders in diseases classified elsewhere   • Fusion of lumbar spine   • Gastritis with hemorrhage   • Gastroesophageal reflux disease   • H/O total hysterectomy with bilateral salpingo-oophorectomy (BSO)   • History of Nissen fundoplication   • Hyperlipidemia   • Hypokalemia   • Malaise and fatigue   • Irritable bowel syndrome without diarrhea   • Laryngeal cancer (CMS/HCC)   • Malignant neoplasm of thyroid gland (CMS/HCC)   • Menopausal syndrome   • Multiple pathological fractures   • Neuralgia   • Neuropathy   • Occlusion of left carotid artery   • Osteoarthritis   • Other cervical disc degeneration, unspecified cervical region   • Iron deficiency anemia   • Other postprocedural states   • Other specified persistent mood disorders (CMS/HCC)   • Multiple joint pain   • Tremor   • Unsteady gait   • Pain of left upper extremity       Current Outpatient Medications on File Prior to  Visit   Medication Sig Dispense Refill   • albuterol sulfate HFA (PROAIR HFA) 108 (90 Base) MCG/ACT inhaler Inhale 2 puffs Daily.     • apixaban (ELIQUIS) 5 MG tablet tablet Take 1 tablet by mouth 2 (Two) Times a Day.     • aspirin 325 MG tablet Take 1 tablet by mouth Daily.     • Calcium Carbonate-Vitamin D (CALCIUM 500 + D) 500-125 MG-UNIT tablet Take 1 tablet by mouth Daily.     • docusate sodium (COLACE) 50 MG capsule Take 1 capsule by mouth 2 (Two) Times a Day.     • DULoxetine (CYMBALTA) 60 MG capsule Take 1 capsule by mouth Daily.     • gabapentin (NEURONTIN) 800 MG tablet Take 1 tablet by mouth 4 (Four) Times a Day.     • hydrochlorothiazide (HYDRODIURIL) 50 MG tablet Take 1 tablet by mouth Daily.     • linaclotide (LINZESS) 290 MCG capsule capsule Take 1 capsule by mouth Daily.     • methadone (DOLOPHINE) 10 MG tablet Take 1 tablet by mouth Every 8 (Eight) Hours As Needed for Moderate Pain  or Severe Pain , 90 tablet 0   • nystatin-triamcinolone (MYCOLOG II) 673235-3.1 UNIT/GM-% cream      • oxyCODONE-acetaminophen (PERCOCET)  MG per tablet Take 1 tablet by mouth 4 (Four) Times a Day As Needed for Moderate Pain . 120 tablet 0   • pantoprazole (PROTONIX) 40 MG EC tablet Take 1 tablet by mouth Daily.     • potassium chloride (KLOR-CON) 20 MEQ CR tablet Take 1 tablet by mouth 2 (Two) Times a Day.     • raloxifene (EVISTA) 60 MG tablet Take 1 tablet by mouth Daily.     • rOPINIRole (REQUIP) 4 MG tablet Take 1 tablet by mouth 3 (Three) Times a Day As Needed.     • rosuvastatin (CRESTOR) 10 MG tablet Take 1 tablet by mouth Daily.     • sertraline (ZOLOFT) 100 MG tablet Take 1 tablet by mouth every night at bedtime.     • clopidogrel (PLAVIX) 75 MG tablet Take 1 tablet by mouth Daily.     • cyanocobalamin (VITAMIN B-12) 500 MCG tablet Take 1 tablet by mouth 2 (Two) Times a Day.     • ferrous sulfate 325 (65 FE) MG tablet Take 1 tablet by mouth 2 (Two) Times a Day.     • Polyethylene Glycol 3350 (MIRALAX PO)  Take 1 packet by mouth Daily.       No current facility-administered medications on file prior to visit.        Allergies   Allergen Reactions   • Meperidine Other (See Comments)       Review of Systems   Constitutional: Negative for activity change, appetite change, fatigue and fever.   HENT: Negative for ear pain and voice change.    Eyes: Negative for visual disturbance.   Respiratory: Negative for shortness of breath and wheezing.    Cardiovascular: Negative for chest pain and leg swelling.   Gastrointestinal: Negative for abdominal pain, blood in stool, constipation, diarrhea, nausea and vomiting.   Endocrine: Negative for polydipsia and polyuria.   Genitourinary: Negative for dysuria, frequency and hematuria.   Musculoskeletal: Positive for arthralgias, joint swelling and myalgias.   Skin: Negative for rash and bruise.   Neurological: Positive for speech difficulty. Negative for weakness, numbness and headache.   Psychiatric/Behavioral: Negative for suicidal ideas and depressed mood.       Objective   Physical Exam   Constitutional: She is oriented to person, place, and time. She appears well-developed and well-nourished.   HENT:   Head: Normocephalic and atraumatic.   Left Ear: External ear normal.   Nose: Nose normal.   Mouth/Throat: Oropharynx is clear and moist.   Eyes: EOM are normal. Pupils are equal, round, and reactive to light.   Neck: Normal range of motion. Neck supple.   Cardiovascular: Normal rate, regular rhythm and normal heart sounds.   Pulmonary/Chest: Effort normal.   Abdominal: Soft. Bowel sounds are normal.   Musculoskeletal:        Left elbow: She exhibits swelling. Tenderness found.   Uses cane.    Left elbow warm, slightly red   Neurological: She is alert and oriented to person, place, and time.   Skin: Skin is warm and dry.   Psychiatric: She has a normal mood and affect. Her behavior is normal. Judgment and thought content normal.         Assessment/Plan   Problem List Items Addressed  This Visit        Nervous and Auditory    Pain of left upper extremity - Primary    Overview     Check labs, xray         Relevant Medications    methylPREDNISolone acetate (DEPO-medrol) injection 80 mg (Start on 7/2/2019  9:57 AM)    Other Relevant Orders    Uric Acid    Sedimentation Rate    XR Elbow 2 View Left        Follow-up after testing complete, sooner for worsening symptoms or any concerns  Follow-up in approximately 3 days for reevaluation if not improved.  Follow-up sooner for worsening symptoms or any concerns.  Suspect gout

## 2019-07-08 NOTE — TELEPHONE ENCOUNTER
----- Message from Hollis Adrian MD sent at 7/3/2019  5:16 PM EDT -----  Please patient that labs were normal.

## 2019-07-08 NOTE — TELEPHONE ENCOUNTER
Pt notified.  Pt states swelling in arm has went down but is still swollen a little. Per Dr. Adrian will send rx for medrol dose pack.

## 2019-07-23 NOTE — PROGRESS NOTES
Subjective   Madalyn Navas is a 63 y.o. female.     Med Refill. Left forearm still swollen. Xray neg      Pain   This is a chronic problem. The current episode started more than 1 year ago. The problem occurs constantly. The problem has been unchanged. Associated symptoms include arthralgias, joint swelling and myalgias. Pertinent negatives include no abdominal pain, chest pain, fatigue, fever, nausea, numbness, rash, vomiting or weakness. She has tried oral narcotics for the symptoms. The treatment provided significant relief.        The following portions of the patient's history were reviewed and updated as appropriate: allergies, current medications, past family history, past medical history, past social history, past surgical history and problem list.    Patient Active Problem List   Diagnosis   • Abnormal mammogram   • Anemia   • Angular cheilitis   • Breast mass, left   • Cerebral infarction due to unspecified occlusion or stenosis of unspecified carotid artery (CMS/HCC)   • Spinal stenosis of cervical region   • Degenerative joint disease of right acromioclavicular joint   • Cellulitis of right upper extremity   • Diaphragmatic hernia   • Edema   • Extrapyramidal and movement disorders in diseases classified elsewhere   • Fusion of lumbar spine   • Gastritis with hemorrhage   • Gastroesophageal reflux disease   • H/O total hysterectomy with bilateral salpingo-oophorectomy (BSO)   • History of Nissen fundoplication   • Hyperlipidemia   • Hypokalemia   • Malaise and fatigue   • Irritable bowel syndrome without diarrhea   • Laryngeal cancer (CMS/HCC)   • Malignant neoplasm of thyroid gland (CMS/HCC)   • Menopausal syndrome   • Multiple pathological fractures   • Neuralgia   • Neuropathy   • Occlusion of left carotid artery   • Osteoarthritis   • Other cervical disc degeneration, unspecified cervical region   • Iron deficiency anemia   • Other postprocedural states   • Other specified persistent mood disorders  (CMS/[facility])   • Multiple joint pain   • Tremor   • Unsteady gait   • Pain of left upper extremity       Current Outpatient Medications on File Prior to Visit   Medication Sig Dispense Refill   • albuterol sulfate HFA (PROAIR HFA) 108 (90 Base) MCG/ACT inhaler Inhale 2 puffs Daily.     • apixaban (ELIQUIS) 5 MG tablet tablet Take 1 tablet by mouth 2 (Two) Times a Day.     • aspirin 325 MG tablet Take 1 tablet by mouth Daily.     • Calcium Carbonate-Vitamin D (CALCIUM 500 + D) 500-125 MG-UNIT tablet Take 1 tablet by mouth Daily.     • clopidogrel (PLAVIX) 75 MG tablet Take 1 tablet by mouth Daily.     • cyanocobalamin (VITAMIN B-12) 500 MCG tablet Take 1 tablet by mouth 2 (Two) Times a Day.     • docusate sodium (COLACE) 50 MG capsule Take 1 capsule by mouth 2 (Two) Times a Day.     • DULoxetine (CYMBALTA) 60 MG capsule Take 1 capsule by mouth Daily.     • ferrous sulfate 325 (65 FE) MG tablet Take 1 tablet by mouth 2 (Two) Times a Day.     • gabapentin (NEURONTIN) 800 MG tablet Take 1 tablet by mouth 4 (Four) Times a Day.     • hydrochlorothiazide (HYDRODIURIL) 50 MG tablet Take 1 tablet by mouth Daily.     • linaclotide (LINZESS) 290 MCG capsule capsule Take 1 capsule by mouth Daily.     • methylPREDNISolone (MEDROL, MARVEL,) 4 MG tablet Take as directed on package instructions. 1 each 0   • nystatin-triamcinolone (MYCOLOG II) 419565-3.1 UNIT/GM-% cream      • pantoprazole (PROTONIX) 40 MG EC tablet Take 1 tablet by mouth Daily.     • Polyethylene Glycol 3350 (MIRALAX PO) Take 1 packet by mouth Daily.     • potassium chloride (KLOR-CON) 20 MEQ CR tablet Take 1 tablet by mouth 2 (Two) Times a Day.     • raloxifene (EVISTA) 60 MG tablet Take 1 tablet by mouth Daily.     • rOPINIRole (REQUIP) 4 MG tablet Take 1 tablet by mouth 3 (Three) Times a Day As Needed.     • rosuvastatin (CRESTOR) 10 MG tablet Take 1 tablet by mouth Daily.     • sertraline (ZOLOFT) 100 MG tablet Take 1 tablet by mouth every night at bedtime.     •  [DISCONTINUED] methadone (DOLOPHINE) 10 MG tablet Take 1 tablet by mouth Every 8 (Eight) Hours As Needed for Moderate Pain  or Severe Pain , 90 tablet 0   • [DISCONTINUED] oxyCODONE-acetaminophen (PERCOCET)  MG per tablet Take 1 tablet by mouth 4 (Four) Times a Day As Needed for Moderate Pain . 120 tablet 0     No current facility-administered medications on file prior to visit.        Allergies   Allergen Reactions   • Meperidine Other (See Comments)       Review of Systems   Constitutional: Negative for activity change, appetite change, fatigue and fever.   HENT: Negative for ear pain and voice change.    Eyes: Negative for visual disturbance.   Respiratory: Negative for shortness of breath and wheezing.    Cardiovascular: Negative for chest pain and leg swelling.   Gastrointestinal: Negative for abdominal pain, blood in stool, constipation, diarrhea, nausea and vomiting.   Endocrine: Negative for polydipsia and polyuria.   Genitourinary: Negative for dysuria, frequency and hematuria.   Musculoskeletal: Positive for arthralgias, joint swelling and myalgias.   Skin: Negative for rash and bruise.   Neurological: Positive for speech difficulty. Negative for weakness, numbness and headache.   Psychiatric/Behavioral: Negative for suicidal ideas and depressed mood.       Objective   Physical Exam   Constitutional: She is oriented to person, place, and time. She appears well-developed and well-nourished.   HENT:   Head: Normocephalic and atraumatic.   Left Ear: External ear normal.   Nose: Nose normal.   Mouth/Throat: Oropharynx is clear and moist.   Eyes: EOM are normal. Pupils are equal, round, and reactive to light.   Neck: Normal range of motion. Neck supple.   Cardiovascular: Normal rate, regular rhythm and normal heart sounds.   Pulmonary/Chest: Effort normal.   Abdominal: Soft. Bowel sounds are normal.   Musculoskeletal:        Left elbow: She exhibits swelling. Tenderness found.   Uses cane.    Left elbow  warm, slightly red   Neurological: She is alert and oriented to person, place, and time.   Skin: Skin is warm and dry.   Psychiatric: She has a normal mood and affect. Her behavior is normal. Judgment and thought content normal.         Assessment/Plan .  Problem List Items Addressed This Visit     Spinal stenosis of cervical region    Relevant Medications    methadone (DOLOPHINE) 10 MG tablet    oxyCODONE-acetaminophen (PERCOCET)  MG per tablet    Laryngeal cancer (CMS/HCC)    Relevant Medications    methadone (DOLOPHINE) 10 MG tablet    oxyCODONE-acetaminophen (PERCOCET)  MG per tablet    Malignant neoplasm of thyroid gland (CMS/HCC)    Relevant Medications    methadone (DOLOPHINE) 10 MG tablet    oxyCODONE-acetaminophen (PERCOCET)  MG per tablet    Multiple pathological fractures    Overview     dexa, calcium, mag normal. Recc ortho eval         Relevant Medications    methadone (DOLOPHINE) 10 MG tablet    oxyCODONE-acetaminophen (PERCOCET)  MG per tablet    Osteoarthritis    Relevant Medications    methadone (DOLOPHINE) 10 MG tablet    oxyCODONE-acetaminophen (PERCOCET)  MG per tablet    Other cervical disc degeneration, unspecified cervical region    Relevant Medications    methadone (DOLOPHINE) 10 MG tablet    oxyCODONE-acetaminophen (PERCOCET)  MG per tablet    Other specified persistent mood disorders (CMS/HCC)    Overview     worsening. Try zoloft. Follow-up in one month, sooner for concerns. Watch for serotonin sx given concurrent cymbalta  anxiety with panic attack         Relevant Medications    methadone (DOLOPHINE) 10 MG tablet    oxyCODONE-acetaminophen (PERCOCET)  MG per tablet    Multiple joint pain    Overview     fibromyalgia         Relevant Medications    methadone (DOLOPHINE) 10 MG tablet    oxyCODONE-acetaminophen (PERCOCET)  MG per tablet    Unsteady gait    Relevant Medications    methadone (DOLOPHINE) 10 MG tablet    oxyCODONE-acetaminophen  (PERCOCET)  MG per tablet      Follow up in 1 month for reevaluation, sooner for concerns.

## 2019-08-01 ENCOUNTER — OFFICE (AMBULATORY)
Dept: URBAN - METROPOLITAN AREA PATHOLOGY 4 | Facility: PATHOLOGY | Age: 64
End: 2019-08-01

## 2019-08-01 ENCOUNTER — ON CAMPUS - OUTPATIENT (AMBULATORY)
Dept: URBAN - METROPOLITAN AREA HOSPITAL 2 | Facility: HOSPITAL | Age: 64
End: 2019-08-01

## 2019-08-01 VITALS
RESPIRATION RATE: 16 BRPM | HEART RATE: 98 BPM | OXYGEN SATURATION: 100 % | HEART RATE: 99 BPM | HEIGHT: 65 IN | OXYGEN SATURATION: 99 % | DIASTOLIC BLOOD PRESSURE: 88 MMHG | DIASTOLIC BLOOD PRESSURE: 64 MMHG | SYSTOLIC BLOOD PRESSURE: 97 MMHG | DIASTOLIC BLOOD PRESSURE: 47 MMHG | WEIGHT: 174.8 LBS | SYSTOLIC BLOOD PRESSURE: 83 MMHG | DIASTOLIC BLOOD PRESSURE: 73 MMHG | RESPIRATION RATE: 17 BRPM | HEART RATE: 86 BPM | RESPIRATION RATE: 18 BRPM | DIASTOLIC BLOOD PRESSURE: 49 MMHG | SYSTOLIC BLOOD PRESSURE: 102 MMHG | DIASTOLIC BLOOD PRESSURE: 71 MMHG | HEART RATE: 88 BPM | SYSTOLIC BLOOD PRESSURE: 108 MMHG | HEART RATE: 84 BPM | SYSTOLIC BLOOD PRESSURE: 131 MMHG | OXYGEN SATURATION: 97 % | SYSTOLIC BLOOD PRESSURE: 129 MMHG | OXYGEN SATURATION: 95 % | TEMPERATURE: 97.7 F | DIASTOLIC BLOOD PRESSURE: 72 MMHG | SYSTOLIC BLOOD PRESSURE: 106 MMHG

## 2019-08-01 DIAGNOSIS — K31.89 OTHER DISEASES OF STOMACH AND DUODENUM: ICD-10-CM

## 2019-08-01 DIAGNOSIS — K25.9 GASTRIC ULCER, UNSPECIFIED AS ACUTE OR CHRONIC, WITHOUT HEMO: ICD-10-CM

## 2019-08-01 DIAGNOSIS — Z98.890 OTHER SPECIFIED POSTPROCEDURAL STATES: ICD-10-CM

## 2019-08-01 DIAGNOSIS — D50.0 IRON DEFICIENCY ANEMIA SECONDARY TO BLOOD LOSS (CHRONIC): ICD-10-CM

## 2019-08-01 DIAGNOSIS — K55.8 OTHER VASCULAR DISORDERS OF INTESTINE: ICD-10-CM

## 2019-08-01 LAB
GI HISTOLOGY: A. UNSPECIFIED: (no result)
GI HISTOLOGY: PDF REPORT: (no result)

## 2019-08-01 PROCEDURE — 44369 SMALL BOWEL ENDOSCOPY: CPT | Performed by: INTERNAL MEDICINE

## 2019-08-01 PROCEDURE — 88342 IMHCHEM/IMCYTCHM 1ST ANTB: CPT | Mod: 26 | Performed by: INTERNAL MEDICINE

## 2019-08-01 PROCEDURE — 44361 SMALL BOWEL ENDOSCOPY/BIOPSY: CPT | Mod: 59 | Performed by: INTERNAL MEDICINE

## 2019-08-01 PROCEDURE — 88305 TISSUE EXAM BY PATHOLOGIST: CPT | Mod: 26 | Performed by: INTERNAL MEDICINE

## 2019-08-01 RX ORDER — SUCRALFATE 1 G/1
TABLET ORAL
Qty: 120 | Refills: 5 | Status: ACTIVE
Start: 2019-08-01

## 2019-08-01 RX ORDER — PANTOPRAZOLE SODIUM 40 MG/1
TABLET, DELAYED RELEASE ORAL
Qty: 180 | Refills: 3 | Status: ACTIVE

## 2019-08-01 RX ADMIN — GLUCAGON 0.5 MG: KIT at 14:49

## 2019-08-13 NOTE — PROGRESS NOTES
Subjective   Madalyn Navas is a 63 y.o. female.     Med Refill.     Ladonna has worsened, fell twice due to it. Feeling lightheaded while standing and sitting. Dx with a huge ulcer. Hernia has been painful. Unable to walk without taking Ibuprofen. GI found multiple ulcers. Pt states she is still having bleeding. Feeling shakey. Pt would also like opinion regarding other surgical options regarding her back.       Pain   This is a chronic problem. The current episode started more than 1 year ago. The problem occurs constantly. The problem has been unchanged. Associated symptoms include arthralgias, joint swelling and myalgias. Pertinent negatives include no abdominal pain, chest pain, fatigue, fever, nausea, neck pain, numbness, rash, swollen glands, vomiting or weakness. She has tried oral narcotics for the symptoms. The treatment provided significant relief.        The following portions of the patient's history were reviewed and updated as appropriate: allergies, current medications, past family history, past medical history, past social history, past surgical history and problem list.    Patient Active Problem List   Diagnosis   • Abnormal mammogram   • Anemia   • Angular cheilitis   • Breast mass, left   • Cerebral infarction due to unspecified occlusion or stenosis of unspecified carotid artery (CMS/HCC)   • Spinal stenosis of cervical region   • Degenerative joint disease of right acromioclavicular joint   • Cellulitis of right upper extremity   • Diaphragmatic hernia   • Edema   • Extrapyramidal and movement disorders in diseases classified elsewhere   • Fusion of lumbar spine   • Gastritis with hemorrhage   • Gastroesophageal reflux disease   • H/O total hysterectomy with bilateral salpingo-oophorectomy (BSO)   • History of Nissen fundoplication   • Hyperlipidemia   • Hypokalemia   • Malaise and fatigue   • Irritable bowel syndrome without diarrhea   • Laryngeal cancer (CMS/HCC)   • Malignant neoplasm of thyroid  gland (CMS/HCC)   • Menopausal syndrome   • Multiple pathological fractures   • Neuralgia   • Neuropathy   • Occlusion of left carotid artery   • Osteoarthritis   • Other cervical disc degeneration, unspecified cervical region   • Iron deficiency anemia   • Other postprocedural states   • Other specified persistent mood disorders (CMS/HCC)   • Multiple joint pain   • Tremor   • Unsteady gait   • Pain of left upper extremity       Current Outpatient Medications on File Prior to Visit   Medication Sig Dispense Refill   • albuterol sulfate HFA (PROAIR HFA) 108 (90 Base) MCG/ACT inhaler Inhale 2 puffs Daily.     • aspirin 325 MG tablet Take 1 tablet by mouth Daily.     • Calcium Carbonate-Vitamin D (CALCIUM 500 + D) 500-125 MG-UNIT tablet Take 1 tablet by mouth Daily.     • clopidogrel (PLAVIX) 75 MG tablet Take 1 tablet by mouth Daily.     • cyanocobalamin (VITAMIN B-12) 500 MCG tablet Take 1 tablet by mouth 2 (Two) Times a Day.     • docusate sodium (COLACE) 50 MG capsule Take 1 capsule by mouth 2 (Two) Times a Day.     • DULoxetine (CYMBALTA) 60 MG capsule Take 1 capsule by mouth Daily.     • ELIQUIS 5 MG tablet tablet TAKE ONE TABLET BY MOUTH TWO TIMES A DAY 60 tablet 5   • ferrous sulfate 325 (65 FE) MG tablet Take 1 tablet by mouth 2 (Two) Times a Day.     • gabapentin (NEURONTIN) 800 MG tablet TAKE ONE TABLET BY MOUTH FOUR TIMES A  tablet 12   • hydrochlorothiazide (HYDRODIURIL) 50 MG tablet TAKE ONE TABLET BY MOUTH ONCE DAILY 30 tablet 5   • linaclotide (LINZESS) 290 MCG capsule capsule Take 1 capsule by mouth Daily.     • methylPREDNISolone (MEDROL, MARVEL,) 4 MG tablet Take as directed on package instructions. 1 each 0   • nystatin-triamcinolone (MYCOLOG II) 345647-6.1 UNIT/GM-% cream      • pantoprazole (PROTONIX) 40 MG EC tablet Take 1 tablet by mouth Daily.     • Polyethylene Glycol 3350 (MIRALAX PO) Take 1 packet by mouth Daily.     • potassium chloride (KLOR-CON) 20 MEQ CR tablet Take 1 tablet by  mouth 2 (Two) Times a Day.     • raloxifene (EVISTA) 60 MG tablet Take 1 tablet by mouth Daily.     • rOPINIRole (REQUIP) 4 MG tablet Take 1 tablet by mouth 3 (Three) Times a Day As Needed.     • rosuvastatin (CRESTOR) 10 MG tablet Take 1 tablet by mouth Daily.     • sertraline (ZOLOFT) 100 MG tablet Take 1 tablet by mouth every night at bedtime.     • [DISCONTINUED] methadone (DOLOPHINE) 10 MG tablet Take 1 tablet by mouth Every 8 (Eight) Hours As Needed for Moderate Pain  or Severe Pain , 90 tablet 0   • [DISCONTINUED] oxyCODONE-acetaminophen (PERCOCET)  MG per tablet Take 1 tablet by mouth 4 (Four) Times a Day As Needed for Moderate Pain . 120 tablet 0     No current facility-administered medications on file prior to visit.        Allergies   Allergen Reactions   • Meperidine Other (See Comments)       Review of Systems   Constitutional: Negative for activity change, appetite change, fatigue and fever.   HENT: Negative for ear pain, swollen glands and voice change.    Eyes: Negative for visual disturbance.   Respiratory: Negative for shortness of breath and wheezing.    Cardiovascular: Negative for chest pain and leg swelling.   Gastrointestinal: Negative for abdominal pain, blood in stool, constipation, diarrhea, nausea and vomiting.   Endocrine: Negative for polydipsia and polyuria.   Genitourinary: Negative for dysuria, frequency and hematuria.   Musculoskeletal: Positive for arthralgias, joint swelling and myalgias. Negative for neck pain and neck stiffness.   Skin: Negative for rash and bruise.   Neurological: Negative for weakness, numbness and headache.   Psychiatric/Behavioral: Negative for suicidal ideas and depressed mood.       Objective   Physical Exam   Constitutional: She is oriented to person, place, and time. She appears well-developed and well-nourished.   HENT:   Head: Normocephalic and atraumatic.   Left Ear: External ear normal.   Nose: Nose normal.   Mouth/Throat: Oropharynx is clear  and moist.   Eyes: EOM are normal. Pupils are equal, round, and reactive to light.   Neck: Normal range of motion. Neck supple.   Cardiovascular: Normal rate, regular rhythm and normal heart sounds.   Pulmonary/Chest: Effort normal.   Abdominal: Soft. Bowel sounds are normal.   Musculoskeletal:        Left elbow: She exhibits swelling. Tenderness found.   Uses cane.    Left elbow warm, slightly red   Neurological: She is alert and oriented to person, place, and time.   Skin: Skin is warm and dry.   Psychiatric: She has a normal mood and affect. Her behavior is normal. Judgment and thought content normal.         Assessment/Plan .  Problem List Items Addressed This Visit     Spinal stenosis of cervical region    Relevant Medications    oxyCODONE-acetaminophen (PERCOCET)  MG per tablet    methadone (DOLOPHINE) 10 MG tablet    Other Relevant Orders    MRI Lumbar Spine Without Contrast    Ambulatory Referral to Orthopedic Surgery    Fusion of lumbar spine    Overview     and 12/2013         Relevant Orders    MRI Lumbar Spine Without Contrast    Ambulatory Referral to Orthopedic Surgery    Laryngeal cancer (CMS/[facility])    Relevant Medications    oxyCODONE-acetaminophen (PERCOCET)  MG per tablet    methadone (DOLOPHINE) 10 MG tablet    Malignant neoplasm of thyroid gland (CMS/HCC)    Relevant Medications    oxyCODONE-acetaminophen (PERCOCET)  MG per tablet    methadone (DOLOPHINE) 10 MG tablet    Multiple pathological fractures    Overview     dexa, calcium, mag normal. Recc ortho eval         Relevant Medications    oxyCODONE-acetaminophen (PERCOCET)  MG per tablet    methadone (DOLOPHINE) 10 MG tablet    Osteoarthritis    Relevant Medications    oxyCODONE-acetaminophen (PERCOCET)  MG per tablet    methadone (DOLOPHINE) 10 MG tablet    Other cervical disc degeneration, unspecified cervical region    Relevant Medications    oxyCODONE-acetaminophen (PERCOCET)  MG per tablet    methadone  (DOLOPHINE) 10 MG tablet    Other specified persistent mood disorders (CMS/HCC)    Overview     worsening. Try zoloft. Follow-up in one month, sooner for concerns. Watch for serotonin sx given concurrent cymbalta  anxiety with panic attack         Relevant Medications    oxyCODONE-acetaminophen (PERCOCET)  MG per tablet    methadone (DOLOPHINE) 10 MG tablet    Multiple joint pain    Overview     fibromyalgia         Relevant Medications    oxyCODONE-acetaminophen (PERCOCET)  MG per tablet    methadone (DOLOPHINE) 10 MG tablet    Unsteady gait    Relevant Medications    oxyCODONE-acetaminophen (PERCOCET)  MG per tablet    methadone (DOLOPHINE) 10 MG tablet      Other Visit Diagnoses     Weakness    -  Primary    Relevant Orders    CBC Auto Differential    Comprehensive Metabolic Panel        Findings discussed. All questions answered.  Medication and medication adverse effects discussed.  Drug education given and explained to patient. Patient verbalized understanding.  Follow-up after testing complete, sooner for worsening symptoms or any concerns

## 2019-08-15 NOTE — TELEPHONE ENCOUNTER
----- Message from Hollis Adrian MD sent at 8/14/2019  2:06 PM EDT -----  Please notify patient that hgb was 8.2, not low enough for transfusion.

## 2019-08-20 NOTE — TELEPHONE ENCOUNTER
----- Message from Hollis Adrian MD sent at 8/19/2019  2:51 PM EDT -----  Please notify patient that  MRI showed significant stenosis/narrowing,but it showed no fracture

## 2019-08-22 NOTE — PROGRESS NOTES
Visit Type: Follow Up  Referring Provider: No ref. provider found  Primary Care Provider: Hollis Adrian MD    Patient Name: Madalyn Navas  Patient Age: 63 y.o.  Chief Complaint: had concerns including Follow-up and Pain of the Lumbar Spine (MRI follow up).    Subjective   History of Present Illness: This patient is here today for follow-up she is a 63 years old female who is complaining of intractable lower back pain with radiation of pain to her lower extremities and difficulty walking.  Patient is a status post previous lumbar fusion by Dr. Lind the patient had MRI of lumbar spine which is demonstrating disc degeneration, facet arthropathy spinal foraminal stenosis of L2-L3, she is a status post L3-S1 fusion.  She stated to me her pain is getting better with lying down aggravated with standing and walking the patient tried conservative modality of treatment as like as physical therapy and pain management without significant change in his pain pattern.      Review of Systems   Constitutional: Positive for activity change.   Respiratory: Positive for cough.         Patient is a smoker   Musculoskeletal: Positive for back pain and myalgias.   Neurological: Positive for weakness and numbness.       The following portions of the patient's history were reviewed and updated as appropriate: allergies, current medications, past family history, past medical history, past social history, past surgical history and problem list.    Past Medical History:   Diagnosis Date   • Abnormal mammogram    • Acute bronchitis    • Acute non-infective otitis externa of right ear     Unspecified.    • Anemia, unspecified     Impression: recheck   • Angular cheilitis    • Bloody stool     Impression: recommend GI referral.   • Breast mass, left     Impression: abcess on ultrasound. Needs follow up diagnostic mammo   • Cellulitis of right upper extremity     Impression: at previous IV site. start atbx, heat.   • Cerebral ischemia      Other specified transient cerebral ischemias   • Cerebrovascular accident, old     Impression: followed by neurology. Has appt in Feb   • Cervical cancer screening     Impression: s/p hysterectomy   • Cervical disc disorder with myelopathy     Impression: currently undergoing accupuncture which works well.   • Closed fracture of bone     Foot.    • Cough     Impression: likely reactive   • Cramps, muscle, general     Impression: check labs   • Degeneration of cervical intervertebral disc     Impression: try increasing requip due to RLS   • Degenerative disc disease, lumbar    • Diaphragmatic hernia    • Edema     Impression: dependent. BP noted. Baseline labs ok. on hctz, try increase to 50 mg qd   • Extrapyramidal and movement disorders in diseases classified elsewhere    • Fever presenting with conditions classified elsewhere    • Folliculitis     Impression: right chin. Try bactroban Follow-up in 2 weeks for reevaluation, sooner for concerns.   • Fracture of right hip, closed, with nonunion, subsequent encounter     Impression: June 2015 Dr Carrera. Will get records   • Fracture, intertrochanteric, right femur (CMS/HCC)    • Fusion of lumbar spine     Story: and 12/2013   • Gastritis with hemorrhage     Unspecified chronicity, unspecified gastritis type. Impression: GI bleed noted on camera evaluation, followed by GI   • GERD (gastroesophageal reflux disease)    • H/O total hysterectomy with bilateral salpingo-oophorectomy (BSO)    • Hip pain, left     Impression: will re-xray   • History of Nissen fundoplication    • History of tobacco use    • Hx of cholecystectomy     Open.   • Hypokalemia     Impression: continue potassium, recheck in 2 weeks   • Initial Medicare annual wellness visit    • Internal derangement of shoulder, right     Impression: MRI showed labral tears, surger scheduled 11/7/17 by Dr David   • Iron deficiency anemia, unspecified     Impression: recommended venofer. Pt would like to discuss with  specialist. GI AVMs cause chronic blood loss. Followed by GI   • Irritable bowel syndrome without diarrhea     Impression: constipation predominant   • Knee pain    • Laryngeal cancer (CMS/HCC)    • Leg pain     Impression: 10 day history   • Lumbar disc disorder with myelopathy     Impression: failed fentanyl, go back to methadone. improved with epidurals Pt would like referral to Dr Abdi for opinion.   • Malaise and fatigue    • Malignant neoplasm of thyroid gland (CMS/HCC)    • Medicare annual wellness visit, subsequent     With abnormal findings   • Menopausal syndrome    • Mixed hyperlipidemia     Story: Stable, Compliant with meds because Is getting adequate diet and exercise Goals developed at last visit were met because Follow up in months Care management needs are self addressed. Would benefit from care management Self-Management abilities addressed and patient is capable of managing his/her own disease   • Multiple pathological fractures     Sequela. Impression: dexa, calcium, mag normal. Recc ortho eval   • Neuralgia    • Neuropathy     Impression: try foltx   • Occlusion of left carotid artery     Impression: nonsurgicsl. Seen by vascular   • Orthostatic hypotension    • Osteoporosis     Impression: recheck dexa   • Other specified abdominal hernia without obstruction or gangrene     Impression: most likely. Findings discussed. All questions answered. Reassurance, education. Consider CT if changes. Pt not interested in surgical repair   • Other specified persistent mood disorders (CMS/HCC)     Story: anxiety with panic attack. Impression: worsening. Try zoloft. Follow-up in one month, sooner for concerns. Watch for serotonin sx given concurrent cymbalta   • Overweight (BMI 25.0-29.9)    • Pain of both hip joints     Impression: refer to Dr Marcano   • Pelvic fracture (CMS/HCC)    • Polyarthralgia     Story: fibromyalgia   • Positive depression screening    • S/P lumbar spine operation    • Screening for  alcoholism    • Screening for osteoporosis    • Seroma, post-traumatic (CMS/HCC)     Impression: likely at site of nephrectomy. Findings discussed. All questions answered. Empiric antibiotics. Follow-up for routine health maintenance as directed. Keep appt with urologist   • Sinusitis    • Tobacco use    • Tremor     Story: unclear if intermittent tremor central from petit mal or focal seizure vs local nerve irritation. Findings discussed. All questions answered. Differential diagnosis discussed Impression: left sided. Try increasing requip to 4 mg tid. Continue gabapentin. May need neurology eval for treatment options. Will get EEgprior to eval for petit mal    • Unsteady gait    • UTI (urinary tract infection)    • Visit for screening mammogram        Past Surgical History:   Procedure Laterality Date   • HIP SURGERY Right     ORIF right hip   • NEPHRECTOMY  07/09/2013    Right, [facility].   • SHOULDER SURGERY Right 11/07/2017    arthroplasty   • TOTAL ABDOMINAL HYSTERECTOMY WITH SALPINGO OOPHORECTOMY         Vitals:    08/21/19 1421   BP: (!) 82/46   Pulse: 111       Patient Active Problem List   Diagnosis   • Abnormal mammogram   • Anemia   • Angular cheilitis   • Breast mass, left   • Cerebral infarction due to unspecified occlusion or stenosis of unspecified carotid artery (CMS/HCC)   • Spinal stenosis of cervical region   • Degenerative joint disease of right acromioclavicular joint   • Cellulitis of right upper extremity   • Diaphragmatic hernia   • Edema   • Extrapyramidal and movement disorders in diseases classified elsewhere   • Fusion of lumbar spine   • Gastritis with hemorrhage   • Gastroesophageal reflux disease   • H/O total hysterectomy with bilateral salpingo-oophorectomy (BSO)   • History of Nissen fundoplication   • Hyperlipidemia   • Hypokalemia   • Malaise and fatigue   • Irritable bowel syndrome without diarrhea   • Laryngeal cancer (CMS/HCC)   • Malignant neoplasm of thyroid  gland (CMS/HCC)   • Menopausal syndrome   • Multiple pathological fractures   • Neuralgia   • Neuropathy   • Occlusion of left carotid artery   • Osteoarthritis   • Other cervical disc degeneration, unspecified cervical region   • Iron deficiency anemia   • Other postprocedural states   • Other specified persistent mood disorders (CMS/HCC)   • Multiple joint pain   • Tremor   • Unsteady gait   • Pain of left upper extremity       Family History Summary:  family history includes Cancer in her other; Diabetes in her other.    Social History     Socioeconomic History   • Marital status:      Spouse name: Not on file   • Number of children: Not on file   • Years of education: Not on file   • Highest education level: Not on file   Tobacco Use   • Smoking status: Current Every Day Smoker     Packs/day: 1.00     Types: Cigarettes   Substance and Sexual Activity   • Alcohol use: No     Frequency: Never   • Drug use: No         Current Outpatient Medications:   •  albuterol sulfate HFA (PROAIR HFA) 108 (90 Base) MCG/ACT inhaler, Inhale 2 puffs Daily., Disp: , Rfl:   •  aspirin 325 MG tablet, Take 1 tablet by mouth Daily., Disp: , Rfl:   •  Calcium Carbonate-Vitamin D (CALCIUM 500 + D) 500-125 MG-UNIT tablet, Take 1 tablet by mouth Daily., Disp: , Rfl:   •  clopidogrel (PLAVIX) 75 MG tablet, Take 1 tablet by mouth Daily., Disp: , Rfl:   •  cyanocobalamin (VITAMIN B-12) 500 MCG tablet, Take 1 tablet by mouth 2 (Two) Times a Day., Disp: , Rfl:   •  docusate sodium (COLACE) 50 MG capsule, Take 1 capsule by mouth 2 (Two) Times a Day., Disp: , Rfl:   •  DULoxetine (CYMBALTA) 60 MG capsule, Take 1 capsule by mouth Daily., Disp: , Rfl:   •  ELIQUIS 5 MG tablet tablet, TAKE ONE TABLET BY MOUTH TWO TIMES A DAY, Disp: 60 tablet, Rfl: 5  •  ferrous sulfate 325 (65 FE) MG tablet, Take 1 tablet by mouth 2 (Two) Times a Day., Disp: , Rfl:   •  gabapentin (NEURONTIN) 800 MG tablet, TAKE ONE TABLET BY MOUTH FOUR TIMES A DAY, Disp:  120 tablet, Rfl: 12  •  hydrochlorothiazide (HYDRODIURIL) 50 MG tablet, TAKE ONE TABLET BY MOUTH ONCE DAILY, Disp: 30 tablet, Rfl: 5  •  linaclotide (LINZESS) 290 MCG capsule capsule, Take 1 capsule by mouth Daily., Disp: , Rfl:   •  methadone (DOLOPHINE) 10 MG tablet, Take 1 tablet by mouth Every 8 (Eight) Hours As Needed for Moderate Pain  or Severe Pain ,, Disp: 90 tablet, Rfl: 0  •  nystatin-triamcinolone (MYCOLOG II) 538288-5.1 UNIT/GM-% cream, , Disp: , Rfl:   •  oxyCODONE-acetaminophen (PERCOCET)  MG per tablet, Take 1 tablet by mouth 4 (Four) Times a Day As Needed for Moderate Pain ., Disp: 120 tablet, Rfl: 0  •  pantoprazole (PROTONIX) 40 MG EC tablet, Take 1 tablet by mouth Daily., Disp: , Rfl:   •  potassium chloride (KLOR-CON) 20 MEQ CR tablet, Take 1 tablet by mouth 2 (Two) Times a Day., Disp: , Rfl:   •  raloxifene (EVISTA) 60 MG tablet, Take 1 tablet by mouth Daily., Disp: , Rfl:   •  rOPINIRole (REQUIP) 4 MG tablet, Take 1 tablet by mouth 3 (Three) Times a Day As Needed., Disp: , Rfl:   •  rosuvastatin (CRESTOR) 10 MG tablet, Take 1 tablet by mouth Daily., Disp: , Rfl:   •  sertraline (ZOLOFT) 100 MG tablet, Take 1 tablet by mouth every night at bedtime., Disp: , Rfl:     Allergies   Allergen Reactions   • Meperidine Other (See Comments)           Objective   Physical Exam  Back Exam     Tenderness   The patient is experiencing tenderness in the lumbar.    Range of Motion   Extension:  20 abnormal   Flexion:  30 abnormal   Lateral bend right:  10 abnormal   Lateral bend left:  10 abnormal   Rotation right:  10 abnormal   Rotation left:  10 abnormal     Muscle Strength   Right Quadriceps:  4/5   Left Quadriceps:  4/5   Right Hamstrings:  5/5   Left Hamstrings:  5/5     Tests   Straight leg raise right: negative  Straight leg raise left: negative    Reflexes   Patellar: 1/4  Achilles: 1/4  Biceps: normal  Babinski's sign: normal     Other   Toe walk: normal  Heel walk: normal  Sensation:  normal  Gait: normal               Impression and Plan: This patient is here today for evaluation of her lumbar spine MRI, the patient is a status post L3- S1 fusion by Dr. Lind in another facility, the patient x-rays compatible with spinal fusion of L3-S1 with loss of lordosis at the area of fusion, MRI is compatible with spinal and foraminal stenosis facet arthropathy of L2-L3.  Plan; the patient is stated to me her pain is pretty bad and it is affecting the quality of her life, her pain is getting better with lying down and sitting aggravated with standing and walking the patient would like to proceed with surgical intervention I am going to obtain a CT scan of lumbar spine to look at the status of fusion also look at the bony structure at the level of L2-L3 and in my next office visit I am going to consider to reevaluate her sagittal alignment and her pelvic incidence.    Chronic low back pain, unspecified back pain laterality, with sciatica presence unspecified [M54.5, G89.29]     Orders Placed This Encounter   Procedures   • XR Spine Lumbar 2 or 3 View     Scheduling Instructions:      [id number]      LSpine ap/lat standing      Ongoing LBP       3:11     Order Specific Question:   Reason for Exam:     Answer:   low back pain   • CT Lumbar Spine Without Contrast     Standing Status:   Future     Standing Expiration Date:   8/21/2020               Pavel Marie MD  08/22/19  3:21 PM

## 2019-08-28 NOTE — PROGRESS NOTES
Visit Type: Follow Up  Referring Provider: No ref. provider found  Primary Care Provider: Hollis Adrian MD    Patient Name: Madayln Navas  Patient Age: 63 y.o.  Chief Complaint: had concerns including Follow-up of the Lumbar Spine (CT Lspine).    Subjective   History of Present Illness: This patient is a pleasant 63 years old female who is here for evaluation of her lumbar spine CT scan patient is a status post L3-S1 fusion and implantation by Dr. Lind patient developed adjacent disc disease disc degeneration facet arthropathy of L2-L3 and the CT scan is compatible with loss of lordosis of lumbar spine and a sign of lucency surrounding the interbody spacer, her Hounsfield unit is at normal range.  The patient is not able to stand straight, with bending of her knees the pain in her lumbar spine get better      Review of Systems   Constitutional: Positive for activity change and fatigue.   Musculoskeletal: Positive for back pain and myalgias.   Neurological: Positive for weakness and numbness.       The following portions of the patient's history were reviewed and updated as appropriate: allergies, current medications, past family history, past medical history, past social history, past surgical history and problem list.    Past Medical History:   Diagnosis Date   • Abnormal mammogram    • Acute bronchitis    • Acute non-infective otitis externa of right ear     Unspecified.    • Anemia, unspecified     Impression: recheck   • Angular cheilitis    • Bloody stool     Impression: recommend GI referral.   • Breast mass, left     Impression: abcess on ultrasound. Needs follow up diagnostic mammo   • Cellulitis of right upper extremity     Impression: at previous IV site. start atbx, heat.   • Cerebral ischemia     Other specified transient cerebral ischemias   • Cerebrovascular accident, old     Impression: followed by neurology. Has appt in Feb   • Cervical cancer screening     Impression: s/p hysterectomy   •  Cervical disc disorder with myelopathy     Impression: currently undergoing accupuncture which works well.   • Closed fracture of bone     Foot.    • Cough     Impression: likely reactive   • Cramps, muscle, general     Impression: check labs   • Degeneration of cervical intervertebral disc     Impression: try increasing requip due to RLS   • Degenerative disc disease, lumbar    • Diaphragmatic hernia    • Edema     Impression: dependent. BP noted. Baseline labs ok. on hctz, try increase to 50 mg qd   • Extrapyramidal and movement disorders in diseases classified elsewhere    • Fever presenting with conditions classified elsewhere    • Folliculitis     Impression: right chin. Try bactroban Follow-up in 2 weeks for reevaluation, sooner for concerns.   • Fracture of right hip, closed, with nonunion, subsequent encounter     Impression: June 2015 Dr Carrera. Will get records   • Fracture, intertrochanteric, right femur (CMS/HCC)    • Fusion of lumbar spine     Story: and 12/2013   • Gastritis with hemorrhage     Unspecified chronicity, unspecified gastritis type. Impression: GI bleed noted on camera evaluation, followed by GI   • GERD (gastroesophageal reflux disease)    • H/O total hysterectomy with bilateral salpingo-oophorectomy (BSO)    • Hip pain, left     Impression: will re-xray   • History of Nissen fundoplication    • History of tobacco use    • Hx of cholecystectomy     Open.   • Hypokalemia     Impression: continue potassium, recheck in 2 weeks   • Initial Medicare annual wellness visit    • Internal derangement of shoulder, right     Impression: MRI showed labral tears, surger scheduled 11/7/17 by Dr David   • Iron deficiency anemia, unspecified     Impression: recommended venofer. Pt would like to discuss with specialist. GI AVMs cause chronic blood loss. Followed by GI   • Irritable bowel syndrome without diarrhea     Impression: constipation predominant   • Knee pain    • Laryngeal cancer (CMS/HCC)    •  Leg pain     Impression: 10 day history   • Lumbar disc disorder with myelopathy     Impression: failed fentanyl, go back to methadone. improved with epidurals Pt would like referral to Dr Abdi for opinion.   • Malaise and fatigue    • Malignant neoplasm of thyroid gland (CMS/[facility])    • Medicare annual wellness visit, subsequent     With abnormal findings   • Menopausal syndrome    • Mixed hyperlipidemia     Story: Stable, Compliant with meds because Is getting adequate diet and exercise Goals developed at last visit were met because Follow up in months Care management needs are self addressed. Would benefit from care management Self-Management abilities addressed and patient is capable of managing his/her own disease   • Multiple pathological fractures     Sequela. Impression: dexa, calcium, mag normal. Recc ortho eval   • Neuralgia    • Neuropathy     Impression: try foltx   • Occlusion of left carotid artery     Impression: nonsurgicsl. Seen by vascular   • Orthostatic hypotension    • Osteoporosis     Impression: recheck dexa   • Other specified abdominal hernia without obstruction or gangrene     Impression: most likely. Findings discussed. All questions answered. Reassurance, education. Consider CT if changes. Pt not interested in surgical repair   • Other specified persistent mood disorders (CMS/[facility])     Story: anxiety with panic attack. Impression: worsening. Try zoloft. Follow-up in one month, sooner for concerns. Watch for serotonin sx given concurrent cymbalta   • Overweight (BMI 25.0-29.9)    • Pain of both hip joints     Impression: refer to Dr Marcano   • Pelvic fracture (CMS/[facility])    • Polyarthralgia     Story: fibromyalgia   • Positive depression screening    • S/P lumbar spine operation    • Screening for alcoholism    • Screening for osteoporosis    • Seroma, post-traumatic (CMS/[facility])     Impression: likely at site of nephrectomy. Findings discussed. All questions answered. Empiric antibiotics.  Follow-up for routine health maintenance as directed. Keep appt with urologist   • Sinusitis    • Tobacco use    • Tremor     Story: unclear if intermittent tremor central from petit mal or focal seizure vs local nerve irritation. Findings discussed. All questions answered. Differential diagnosis discussed Impression: left sided. Try increasing requip to 4 mg tid. Continue gabapentin. May need neurology eval for treatment options. Will get EEgprior to eval for petit mal    • Unsteady gait    • UTI (urinary tract infection)    • Visit for screening mammogram        Past Surgical History:   Procedure Laterality Date   • HIP SURGERY Right     ORIF right hip   • NEPHRECTOMY  07/09/2013    Right, [facility].   • SHOULDER SURGERY Right 11/07/2017    arthroplasty   • TOTAL ABDOMINAL HYSTERECTOMY WITH SALPINGO OOPHORECTOMY         Vitals:    08/28/19 1238   BP: 116/59   Pulse: 90       Patient Active Problem List   Diagnosis   • Abnormal mammogram   • Anemia   • Angular cheilitis   • Breast mass, left   • Cerebral infarction due to unspecified occlusion or stenosis of unspecified carotid artery (CMS/HCC)   • Spinal stenosis of cervical region   • Degenerative joint disease of right acromioclavicular joint   • Cellulitis of right upper extremity   • Diaphragmatic hernia   • Edema   • Extrapyramidal and movement disorders in diseases classified elsewhere   • Fusion of lumbar spine   • Gastritis with hemorrhage   • Gastroesophageal reflux disease   • H/O total hysterectomy with bilateral salpingo-oophorectomy (BSO)   • History of Nissen fundoplication   • Hyperlipidemia   • Hypokalemia   • Malaise and fatigue   • Irritable bowel syndrome without diarrhea   • Laryngeal cancer (CMS/HCC)   • Malignant neoplasm of thyroid gland (CMS/HCC)   • Menopausal syndrome   • Multiple pathological fractures   • Neuralgia   • Neuropathy   • Occlusion of left carotid artery   • Osteoarthritis   • Other cervical disc degeneration,  unspecified cervical region   • Iron deficiency anemia   • Other postprocedural states   • Other specified persistent mood disorders (CMS/HCC)   • Multiple joint pain   • Tremor   • Unsteady gait   • Pain of left upper extremity       Family History Summary:  family history includes Cancer in her other; Diabetes in her other.    Social History     Socioeconomic History   • Marital status:      Spouse name: Not on file   • Number of children: Not on file   • Years of education: Not on file   • Highest education level: Not on file   Tobacco Use   • Smoking status: Current Every Day Smoker     Packs/day: 1.00     Types: Cigarettes   Substance and Sexual Activity   • Alcohol use: No     Frequency: Never   • Drug use: No         Current Outpatient Medications:   •  albuterol sulfate HFA (PROAIR HFA) 108 (90 Base) MCG/ACT inhaler, Inhale 2 puffs Daily., Disp: , Rfl:   •  aspirin 325 MG tablet, Take 1 tablet by mouth Daily., Disp: , Rfl:   •  Calcium Carbonate-Vitamin D (CALCIUM 500 + D) 500-125 MG-UNIT tablet, Take 1 tablet by mouth Daily., Disp: , Rfl:   •  clopidogrel (PLAVIX) 75 MG tablet, Take 1 tablet by mouth Daily., Disp: , Rfl:   •  cyanocobalamin (VITAMIN B-12) 500 MCG tablet, Take 1 tablet by mouth 2 (Two) Times a Day., Disp: , Rfl:   •  docusate sodium (COLACE) 50 MG capsule, Take 1 capsule by mouth 2 (Two) Times a Day., Disp: , Rfl:   •  DULoxetine (CYMBALTA) 60 MG capsule, Take 1 capsule by mouth Daily., Disp: , Rfl:   •  ELIQUIS 5 MG tablet tablet, TAKE ONE TABLET BY MOUTH TWO TIMES A DAY, Disp: 60 tablet, Rfl: 5  •  ferrous sulfate 325 (65 FE) MG tablet, Take 1 tablet by mouth 2 (Two) Times a Day., Disp: , Rfl:   •  gabapentin (NEURONTIN) 800 MG tablet, TAKE ONE TABLET BY MOUTH FOUR TIMES A DAY, Disp: 120 tablet, Rfl: 12  •  hydrochlorothiazide (HYDRODIURIL) 50 MG tablet, TAKE ONE TABLET BY MOUTH ONCE DAILY, Disp: 30 tablet, Rfl: 5  •  linaclotide (LINZESS) 290 MCG capsule capsule, Take 1 capsule by  mouth Daily., Disp: , Rfl:   •  methadone (DOLOPHINE) 10 MG tablet, Take 1 tablet by mouth Every 8 (Eight) Hours As Needed for Moderate Pain  or Severe Pain ,, Disp: 90 tablet, Rfl: 0  •  nystatin-triamcinolone (MYCOLOG II) 215966-2.1 UNIT/GM-% cream, , Disp: , Rfl:   •  oxyCODONE-acetaminophen (PERCOCET)  MG per tablet, Take 1 tablet by mouth 4 (Four) Times a Day As Needed for Moderate Pain ., Disp: 120 tablet, Rfl: 0  •  pantoprazole (PROTONIX) 40 MG EC tablet, Take 1 tablet by mouth Daily., Disp: , Rfl:   •  potassium chloride (KLOR-CON) 20 MEQ CR tablet, Take 1 tablet by mouth 2 (Two) Times a Day., Disp: , Rfl:   •  raloxifene (EVISTA) 60 MG tablet, Take 1 tablet by mouth Daily., Disp: , Rfl:   •  rOPINIRole (REQUIP) 4 MG tablet, Take 1 tablet by mouth 3 (Three) Times a Day As Needed., Disp: , Rfl:   •  rosuvastatin (CRESTOR) 10 MG tablet, Take 1 tablet by mouth Daily., Disp: , Rfl:   •  sertraline (ZOLOFT) 100 MG tablet, Take 1 tablet by mouth every night at bedtime., Disp: , Rfl:     Allergies   Allergen Reactions   • Meperidine Other (See Comments)           Objective   Physical Exam  Back Exam     Tenderness   The patient is experiencing tenderness in the lumbar.    Range of Motion   Extension:  10 abnormal   Flexion: 10   Lateral bend right:  20 abnormal   Lateral bend left:  20 abnormal   Rotation right:  10 abnormal   Rotation left:  10 abnormal     Muscle Strength   The patient has normal back strength.    Tests   Straight leg raise right: negative  Straight leg raise left: negative    Reflexes   Patellar: Hyporeflexic  Achilles: Hyporeflexic  Biceps: Hyporeflexic  Babinski's sign: normal     Other   Toe walk: normal  Heel walk: normal  Sensation: normal  Gait: normal               Impression and Plan: This patient is here today for follow-up and evaluation of her lumbar CT scan, her Hounsfield unit is quite normal, CT scan and x-rays demonstrating flat back syndrome sign of lucency surrounding the  interbody spacer MRIs demonstrating disc degeneration disc desiccation at the level of L2-L3, the patient is a status post L3-S1 fusion, the patient tried conservative modality of treatment without any benefit the patient would like to proceed with surgical intervention.  Plan; I am going to schedule this patient to proceed with exploration of previous surgical area, removal of implantation, pedicle subtraction osteotomy of L5, transforaminal interbody fusion of L2-L3 posterior spinal fusion of L2- S2 AI.  I explained to her and her friend the procedure and the risk of surgical intervention as like as risk of bleeding, infection, DVT , risk of trauma to the thecal sac and nerve roots , CSF leak risk of failure of implantation risk of trauma to the great vessels risk of life and limbs    No primary diagnosis found.     No orders of the defined types were placed in this encounter.              Pavel Marie MD  08/28/19  5:06 PM

## 2019-08-29 PROBLEM — M48.062 LUMBAR STENOSIS WITH NEUROGENIC CLAUDICATION: Status: ACTIVE | Noted: 2019-01-01

## 2019-08-29 PROBLEM — M40.30 FLAT BACK SYNDROME: Status: ACTIVE | Noted: 2019-01-01

## 2019-09-05 PROBLEM — I99.9 VASCULAR DISEASE: Status: ACTIVE | Noted: 2019-01-01

## 2019-09-06 ENCOUNTER — OFFICE (AMBULATORY)
Dept: URBAN - METROPOLITAN AREA CLINIC 64 | Facility: CLINIC | Age: 64
End: 2019-09-06

## 2019-09-06 VITALS
HEIGHT: 65 IN | DIASTOLIC BLOOD PRESSURE: 78 MMHG | SYSTOLIC BLOOD PRESSURE: 144 MMHG | HEART RATE: 93 BPM | WEIGHT: 172 LBS

## 2019-09-06 DIAGNOSIS — D50.9 IRON DEFICIENCY ANEMIA, UNSPECIFIED: ICD-10-CM

## 2019-09-06 DIAGNOSIS — K31.819 ANGIODYSPLASIA OF STOMACH AND DUODENUM WITHOUT BLEEDING: ICD-10-CM

## 2019-09-06 DIAGNOSIS — K25.9 GASTRIC ULCER, UNSPECIFIED AS ACUTE OR CHRONIC, WITHOUT HEMO: ICD-10-CM

## 2019-09-06 PROCEDURE — 99213 OFFICE O/P EST LOW 20 MIN: CPT | Performed by: NURSE PRACTITIONER

## 2019-09-09 PROBLEM — K92.2 ACUTE GI BLEEDING: Status: ACTIVE | Noted: 2019-01-01

## 2019-09-09 NOTE — H&P
[facility] HOSPITALIST     Hollis Adrian MD    CHIEF COMPLAINT:     Low hemoglobin and dark stools    HISTORY OF PRESENT ILLNESS:    Patient is a 62 y/o female with multiple medical problems who presented as a direct admission from gastroenterology for a low hemoglobin. Patient reports she has been having dark black stools for the past month with occasional brbpr. She reports a colonoscopy 3-4 years ago. She denies any abdominal pain, nausea/ vomiting. Denies chest pain, palpitations or sob.      Past Medical History:   Diagnosis Date   • Abnormal mammogram    • Acute bronchitis    • Acute non-infective otitis externa of right ear     Unspecified.    • Anemia, unspecified     Impression: recheck   • Angular cheilitis    • Bloody stool     Impression: recommend GI referral.   • Breast mass, left     Impression: abcess on ultrasound. Needs follow up diagnostic mammo   • Cellulitis of right upper extremity     Impression: at previous IV site. start atbx, heat.   • Cerebral ischemia     Other specified transient cerebral ischemias   • Cerebrovascular accident, old     Impression: followed by neurology. Has appt in Feb   • Cervical cancer screening     Impression: s/p hysterectomy   • Cervical disc disorder with myelopathy     Impression: currently undergoing accupuncture which works well.   • Closed fracture of bone     Foot.    • Cough     Impression: likely reactive   • Cramps, muscle, general     Impression: check labs   • Degeneration of cervical intervertebral disc     Impression: try increasing requip due to RLS   • Degenerative disc disease, lumbar    • Diaphragmatic hernia    • Edema     Impression: dependent. BP noted. Baseline labs ok. on hctz, try increase to 50 mg qd   • Extrapyramidal and movement disorders in diseases classified elsewhere    • Fever presenting with conditions classified elsewhere    • Folliculitis     Impression: right chin. Try bactroban Follow-up in 2 weeks  for reevaluation, sooner for concerns.   • Fracture of right hip, closed, with nonunion, subsequent encounter     Impression: June 2015 Dr Carrera. Will get records   • Fracture, intertrochanteric, right femur (CMS/HCC)    • Fusion of lumbar spine     Story: and 12/2013   • Gastritis with hemorrhage     Unspecified chronicity, unspecified gastritis type. Impression: GI bleed noted on camera evaluation, followed by GI   • GERD (gastroesophageal reflux disease)    • H/O total hysterectomy with bilateral salpingo-oophorectomy (BSO)    • Hip pain, left     Impression: will re-xray   • History of Nissen fundoplication    • History of tobacco use    • Hx of cholecystectomy     Open.   • Hypokalemia     Impression: continue potassium, recheck in 2 weeks   • Initial Medicare annual wellness visit    • Internal derangement of shoulder, right     Impression: MRI showed labral tears, surger scheduled 11/7/17 by Dr David   • Iron deficiency anemia, unspecified     Impression: recommended venofer. Pt would like to discuss with specialist. GI AVMs cause chronic blood loss. Followed by GI   • Irritable bowel syndrome without diarrhea     Impression: constipation predominant   • Knee pain    • Laryngeal cancer (CMS/HCC)    • Leg pain     Impression: 10 day history   • Lumbar disc disorder with myelopathy     Impression: failed fentanyl, go back to methadone. improved with epidurals Pt would like referral to Dr Abdi for opinion.   • Malaise and fatigue    • Malignant neoplasm of thyroid gland (CMS/HCC)    • Medicare annual wellness visit, subsequent     With abnormal findings   • Menopausal syndrome    • Mixed hyperlipidemia     Story: Stable, Compliant with meds because Is getting adequate diet and exercise Goals developed at last visit were met because Follow up in months Care management needs are self addressed. Would benefit from care management Self-Management abilities addressed and patient is capable of managing his/her own  disease   • Multiple pathological fractures     Sequela. Impression: dexa, calcium, mag normal. Recc ortho eval   • Neuralgia    • Neuropathy     Impression: try foltx   • Occlusion of left carotid artery     Impression: nonsurgicsl. Seen by vascular   • Orthostatic hypotension    • Osteoporosis     Impression: recheck dexa   • Other specified abdominal hernia without obstruction or gangrene     Impression: most likely. Findings discussed. All questions answered. Reassurance, education. Consider CT if changes. Pt not interested in surgical repair   • Other specified persistent mood disorders (CMS/HCC)     Story: anxiety with panic attack. Impression: worsening. Try zoloft. Follow-up in one month, sooner for concerns. Watch for serotonin sx given concurrent cymbalta   • Overweight (BMI 25.0-29.9)    • Pain of both hip joints     Impression: refer to Dr Marcano   • Pelvic fracture (CMS/HCC)    • Polyarthralgia     Story: fibromyalgia   • Positive depression screening    • S/P lumbar spine operation    • Screening for alcoholism    • Screening for osteoporosis    • Seroma, post-traumatic (CMS/HCC)     Impression: likely at site of nephrectomy. Findings discussed. All questions answered. Empiric antibiotics. Follow-up for routine health maintenance as directed. Keep appt with urologist   • Sinusitis    • Tobacco use    • Tremor     Story: unclear if intermittent tremor central from petit mal or focal seizure vs local nerve irritation. Findings discussed. All questions answered. Differential diagnosis discussed Impression: left sided. Try increasing requip to 4 mg tid. Continue gabapentin. May need neurology eval for treatment options. Will get EEgprior to eval for petit mal    • Unsteady gait    • UTI (urinary tract infection)    • Visit for screening mammogram      Past Surgical History:   Procedure Laterality Date   • HIP SURGERY Right     ORIF right hip   • NEPHRECTOMY  07/09/2013    Right, [facility].   •  SHOULDER SURGERY Right 11/07/2017    arthroplasty   • TOTAL ABDOMINAL HYSTERECTOMY WITH SALPINGO OOPHORECTOMY       Family History   Problem Relation Age of Onset   • Cancer Other         Lung   • Diabetes Other      Social History     Tobacco Use   • Smoking status: Current Every Day Smoker     Packs/day: 1.00     Types: Cigarettes   Substance Use Topics   • Alcohol use: No     Frequency: Never   • Drug use: No     Medications Prior to Admission   Medication Sig Dispense Refill Last Dose   • aspirin 325 MG tablet Take 1 tablet by mouth Daily.   9/9/2019 at Unknown time   • Calcium Carbonate-Vitamin D (CALCIUM 500 + D) 500-125 MG-UNIT tablet Take 1 tablet by mouth Daily.   9/9/2019 at Unknown time   • clopidogrel (PLAVIX) 75 MG tablet TAKE ONE TABLET BY MOUTH ONCE DAILY 30 tablet 5 9/9/2019 at Unknown time   • cyanocobalamin (VITAMIN B-12) 500 MCG tablet Take 2 tablets by mouth Daily.   9/9/2019 at Unknown time   • docusate sodium (COLACE) 50 MG capsule Take 1 capsule by mouth 2 (Two) Times a Day.   9/9/2019 at Unknown time   • DULoxetine (CYMBALTA) 60 MG capsule Take 60 mg by mouth Daily.   9/9/2019 at Unknown time   • ELIQUIS 5 MG tablet tablet TAKE ONE TABLET BY MOUTH TWO TIMES A DAY 60 tablet 5 9/9/2019 at Unknown time   • ferrous sulfate 325 (65 FE) MG tablet Take 1 tablet by mouth 2 (Two) Times a Day.   9/9/2019 at Unknown time   • gabapentin (NEURONTIN) 800 MG tablet TAKE ONE TABLET BY MOUTH FOUR TIMES A  tablet 12 9/9/2019 at Unknown time   • hydrochlorothiazide (HYDRODIURIL) 50 MG tablet TAKE ONE TABLET BY MOUTH ONCE DAILY 30 tablet 5 9/9/2019 at Unknown time   • l-methylfolate-B6-B12 (FOLTX) 1.13-25-2 MG tablet tablet Take 1 tablet by mouth Daily.      • linaclotide (LINZESS) 290 MCG capsule capsule Take 1 capsule by mouth Daily.   9/9/2019 at Unknown time   • methadone (DOLOPHINE) 10 MG tablet Take 1 tablet by mouth Every 8 (Eight) Hours As Needed for Moderate Pain  or Severe Pain , 90 tablet 0  "9/9/2019 at Unknown time   • oxyCODONE-acetaminophen (PERCOCET)  MG per tablet Take 1 tablet by mouth 4 (Four) Times a Day As Needed for Moderate Pain . 120 tablet 0 9/9/2019 at Unknown time   • pantoprazole (PROTONIX) 40 MG EC tablet Take 1 tablet by mouth Daily.   9/9/2019 at Unknown time   • polyethylene glycol (MIRALAX) packet Take 17 g by mouth Daily.      • potassium chloride (KLOR-CON) 20 MEQ CR tablet Take 1 tablet by mouth 2 (Two) Times a Day.   9/9/2019 at Unknown time   • raloxifene (EVISTA) 60 MG tablet TAKE ONE TABLET BY MOUTH ONCE DAILY 30 tablet 5 9/9/2019 at Unknown time   • rosuvastatin (CRESTOR) 10 MG tablet Take 1 tablet by mouth Daily.   9/9/2019 at Unknown time   • sertraline (ZOLOFT) 100 MG tablet Take 2 tablets by mouth every night at bedtime. 60 tablet 12 9/9/2019 at Unknown time   • DULoxetine (CYMBALTA) 60 MG capsule Take 1 capsule by mouth 2 (Two) Times a Day.   Taking   • nystatin-triamcinolone (MYCOLOG II) 231564-9.1 UNIT/GM-% cream    Taking   • PROAIR  (90 Base) MCG/ACT inhaler INHALE 2 PUFFS EVERY 6 HOURS AS NEEDED FOR SHORTNESS OF AIR 8.5 g 5 Taking   • rOPINIRole (REQUIP) 4 MG tablet Take 1 tablet by mouth 3 (Three) Times a Day As Needed.   Taking     Allergies:  Meperidine    Immunization History   Administered Date(s) Administered   • Flu Vaccine Intradermal Quad 18-64YR 10/02/2017   • Flu Vaccine Split Quad 10/02/2017   • Tdap 03/10/2015           REVIEW OF SYSTEMS:  Please see the above history of present illness for pertinent positives and negatives.  The remainder of the patient's systems have been reviewed and are negative.    Vital Signs  Temp:  [98.3 °F (36.8 °C)-98.5 °F (36.9 °C)] 98.3 °F (36.8 °C)  Heart Rate:  [] 79  Resp:  [16] 16  BP: (125)/(63) 125/63    Flowsheet Rows      First Filed Value   Admission Height  160 cm (63\") Documented at 09/09/2019 1645   Admission Weight  79.1 kg (174 lb 6.1 oz) Documented at 09/09/2019 1645           Physical " Exam:    General: NAD, resting in bed  Eyes: PERRL, nonicteric  HENT: normocephalic, atraumatic, MMM, pharynx clear without erythema or exudate  Card: S1, S2, no murmurs  Resp: CTA b/l, no r/r/w  GI: soft, nt, nd, +bs, multiple scar from previous surgeries  Skin: warm, dry, intact, no rashes  Extremities: no c/c/e  Psych: appropriate mood and affect        Results Review:    I reviewed the patient's new clinical results.  Lab Results (most recent)     None          Assessment/Plan      Anemia  - Hgb 6.2 outpatient  - review of records shows Hgb 8.2 8/13/19  - hold eliquis  - consult GI  - possible colonoscopy in am    Chronic pain  - continue methadone, percocet, gabapentin (INSPECT verified)    Depression/Anxiety  - continue zoloft, cymbalta    RLS  - continue requip    HTN, HLP  - continue home meds    S/p right nephrectomy d/t renal cell carcinoma    Hx of throat cancer    DVT Prophylaxis  - SCDs    I discussed the patients findings and my recommendations with patient.    Roberto Ricketts DO  09/09/19  7:04 PM

## 2019-09-10 ENCOUNTER — INPATIENT HOSPITAL (AMBULATORY)
Dept: URBAN - METROPOLITAN AREA HOSPITAL 84 | Facility: HOSPITAL | Age: 64
End: 2019-09-10

## 2019-09-10 DIAGNOSIS — K25.9 GASTRIC ULCER, UNSPECIFIED AS ACUTE OR CHRONIC, WITHOUT HEMO: ICD-10-CM

## 2019-09-10 DIAGNOSIS — K29.60 OTHER GASTRITIS WITHOUT BLEEDING: ICD-10-CM

## 2019-09-10 DIAGNOSIS — K92.2 GASTROINTESTINAL HEMORRHAGE, UNSPECIFIED: ICD-10-CM

## 2019-09-10 DIAGNOSIS — D64.9 ANEMIA, UNSPECIFIED: ICD-10-CM

## 2019-09-10 PROCEDURE — 43239 EGD BIOPSY SINGLE/MULTIPLE: CPT | Performed by: INTERNAL MEDICINE

## 2019-09-10 PROCEDURE — 45378 DIAGNOSTIC COLONOSCOPY: CPT | Performed by: INTERNAL MEDICINE

## 2019-09-10 NOTE — CONSULTS
GI CONSULT  NOTE:    Referring Provider:  Dr. Ricketts    Chief complaint: Melena and anemia    Subjective .     History of present illness: Patient is a 63-year-old female with complicated past medical history.  She has chronic pain and is on methadone, history of renal cell carcinoma status post right nephrectomy 4 years ago, history of throat cancer 20 years ago, cholecystectomy, back surgery, iron deficiency anemia, bleeding small bowel AVMs, and gastric ulcers.  She presented to the hospital yesterday as direct admit for anemia.  She was seen in our office last week and blood work resulted yesterday morning with hemoglobin of 6.2.  Patient reported black stools so she was directly admitted.  Patient notes having black stools but also bright red blood per rectum intermittently.  More so they have been black.  She is on Eliquis, aspirin, and Plavix.  She also takes oral iron.  She denies abdominal pain or nausea/vomiting.  No complaints of heartburn or dysphagia.  She has been eating well.  No fevers or chills.  She does complain of feeling shaky.  She is scheduled to have back surgery in November 2019.      Endo History:  8/2019 small bowel enteroscopy by Dr. Mckeon - ulcers in the antrum with biopsy negative for H. pylori, AVM in the proximal jejunum status post APC, previous gastric surgery.  12/12/2018 small bowel enteroscopy (Dr. Mckeon) - previous surgery in the cardia, AVMs in the second and third portion of the duodenum, status post APC, AVM in the proximal jejunum status post APC  10/2018 M2A - numerous nonbleeding AVMs in the proximal and mid small bowel  1/2018 EGD/colonoscopy (Dr. Mckeon) - previous surgery in the cardia, AVM in the duodenal bulb status post APC, gastritis (biopsy negative for H. pylori), polyps (HP)    Past Medical History:  Past Medical History:   Diagnosis Date   • Abnormal mammogram    • Acute bronchitis    • Acute non-infective otitis externa of right ear      Unspecified.    • Anemia, unspecified     Impression: recheck   • Angular cheilitis    • Bloody stool     Impression: recommend GI referral.   • Breast mass, left     Impression: abcess on ultrasound. Needs follow up diagnostic mammo   • Cellulitis of right upper extremity     Impression: at previous IV site. start atbx, heat.   • Cerebral ischemia     Other specified transient cerebral ischemias   • Cerebrovascular accident, old     Impression: followed by neurology. Has appt in Feb   • Cervical cancer screening     Impression: s/p hysterectomy   • Cervical disc disorder with myelopathy     Impression: currently undergoing accupuncture which works well.   • Closed fracture of bone     Foot.    • Cough     Impression: likely reactive   • Cramps, muscle, general     Impression: check labs   • Degeneration of cervical intervertebral disc     Impression: try increasing requip due to RLS   • Degenerative disc disease, lumbar    • Diaphragmatic hernia    • Edema     Impression: dependent. BP noted. Baseline labs ok. on hctz, try increase to 50 mg qd   • Extrapyramidal and movement disorders in diseases classified elsewhere    • Fever presenting with conditions classified elsewhere    • Folliculitis     Impression: right chin. Try bactroban Follow-up in 2 weeks for reevaluation, sooner for concerns.   • Fracture of right hip, closed, with nonunion, subsequent encounter     Impression: June 2015 Dr Carrera. Will get records   • Fracture, intertrochanteric, right femur (CMS/HCC)    • Fusion of lumbar spine     Story: and 12/2013   • Gastritis with hemorrhage     Unspecified chronicity, unspecified gastritis type. Impression: GI bleed noted on camera evaluation, followed by GI   • GERD (gastroesophageal reflux disease)    • H/O total hysterectomy with bilateral salpingo-oophorectomy (BSO)    • Hip pain, left     Impression: will re-xray   • History of Nissen fundoplication    • History of tobacco use    • Hx of  cholecystectomy     Open.   • Hypokalemia     Impression: continue potassium, recheck in 2 weeks   • Initial Medicare annual wellness visit    • Internal derangement of shoulder, right     Impression: MRI showed labral tears, surger scheduled 11/7/17 by Dr David   • Iron deficiency anemia, unspecified     Impression: recommended venofer. Pt would like to discuss with specialist. GI AVMs cause chronic blood loss. Followed by GI   • Irritable bowel syndrome without diarrhea     Impression: constipation predominant   • Knee pain    • Laryngeal cancer (CMS/HCC)    • Leg pain     Impression: 10 day history   • Lumbar disc disorder with myelopathy     Impression: failed fentanyl, go back to methadone. improved with epidurals Pt would like referral to Dr Abdi for opinion.   • Malaise and fatigue    • Malignant neoplasm of thyroid gland (CMS/HCC)    • Medicare annual wellness visit, subsequent     With abnormal findings   • Menopausal syndrome    • Mixed hyperlipidemia     Story: Stable, Compliant with meds because Is getting adequate diet and exercise Goals developed at last visit were met because Follow up in months Care management needs are self addressed. Would benefit from care management Self-Management abilities addressed and patient is capable of managing his/her own disease   • Multiple pathological fractures     Sequela. Impression: dexa, calcium, mag normal. Recc ortho eval   • Neuralgia    • Neuropathy     Impression: try foltx   • Occlusion of left carotid artery     Impression: nonsurgicsl. Seen by vascular   • Orthostatic hypotension    • Osteoporosis     Impression: recheck dexa   • Other specified abdominal hernia without obstruction or gangrene     Impression: most likely. Findings discussed. All questions answered. Reassurance, education. Consider CT if changes. Pt not interested in surgical repair   • Other specified persistent mood disorders (CMS/HCC)     Story: anxiety with panic attack. Impression:  worsening. Try zoloft. Follow-up in one month, sooner for concerns. Watch for serotonin sx given concurrent cymbalta   • Overweight (BMI 25.0-29.9)    • Pain of both hip joints     Impression: refer to Dr Marcano   • Pelvic fracture (CMS/HCC)    • Polyarthralgia     Story: fibromyalgia   • Positive depression screening    • S/P lumbar spine operation    • Screening for alcoholism    • Screening for osteoporosis    • Seroma, post-traumatic (CMS/[facility])     Impression: likely at site of nephrectomy. Findings discussed. All questions answered. Empiric antibiotics. Follow-up for routine health maintenance as directed. Keep appt with urologist   • Sinusitis    • Tobacco use    • Tremor     Story: unclear if intermittent tremor central from petit mal or focal seizure vs local nerve irritation. Findings discussed. All questions answered. Differential diagnosis discussed Impression: left sided. Try increasing requip to 4 mg tid. Continue gabapentin. May need neurology eval for treatment options. Will get EEgprior to eval for petit mal    • Unsteady gait    • UTI (urinary tract infection)    • Visit for screening mammogram        Past Surgical History:  Past Surgical History:   Procedure Laterality Date   • HIP SURGERY Right     ORIF right hip   • NEPHRECTOMY  07/09/2013    [facility], [facility].   • SHOULDER SURGERY Right 11/07/2017    arthroplasty   • TOTAL ABDOMINAL HYSTERECTOMY WITH SALPINGO OOPHORECTOMY         Social History:  Social History     Tobacco Use   • Smoking status: Current Every Day Smoker     Packs/day: 1.00     Types: Cigarettes   Substance Use Topics   • Alcohol use: No     Frequency: Never   • Drug use: No       Family History:  Family History   Problem Relation Age of Onset   • Cancer Other         Lung   • Diabetes Other        Medications:  Medications Prior to Admission   Medication Sig Dispense Refill Last Dose   • aspirin 325 MG tablet Take 1 tablet by mouth Daily.   9/9/2019 at Unknown time    • Calcium Carbonate-Vitamin D (CALCIUM 500 + D) 500-125 MG-UNIT tablet Take 1 tablet by mouth Daily.   9/9/2019 at Unknown time   • clopidogrel (PLAVIX) 75 MG tablet TAKE ONE TABLET BY MOUTH ONCE DAILY 30 tablet 5 9/9/2019 at Unknown time   • cyanocobalamin (VITAMIN B-12) 500 MCG tablet Take 2 tablets by mouth Daily.   9/9/2019 at Unknown time   • docusate sodium (COLACE) 50 MG capsule Take 1 capsule by mouth 2 (Two) Times a Day.   9/9/2019 at Unknown time   • DULoxetine (CYMBALTA) 60 MG capsule Take 60 mg by mouth Daily.   9/9/2019 at Unknown time   • ELIQUIS 5 MG tablet tablet TAKE ONE TABLET BY MOUTH TWO TIMES A DAY 60 tablet 5 9/9/2019 at Unknown time   • ferrous sulfate 325 (65 FE) MG tablet Take 1 tablet by mouth 2 (Two) Times a Day.   9/9/2019 at Unknown time   • gabapentin (NEURONTIN) 800 MG tablet TAKE ONE TABLET BY MOUTH FOUR TIMES A  tablet 12 9/9/2019 at Unknown time   • hydrochlorothiazide (HYDRODIURIL) 50 MG tablet TAKE ONE TABLET BY MOUTH ONCE DAILY 30 tablet 5 9/9/2019 at Unknown time   • l-methylfolate-B6-B12 (FOLTX) 1.13-25-2 MG tablet tablet Take 1 tablet by mouth Daily.      • linaclotide (LINZESS) 290 MCG capsule capsule Take 1 capsule by mouth Daily.   9/9/2019 at Unknown time   • methadone (DOLOPHINE) 10 MG tablet Take 1 tablet by mouth Every 8 (Eight) Hours As Needed for Moderate Pain  or Severe Pain , 90 tablet 0 9/9/2019 at Unknown time   • oxyCODONE-acetaminophen (PERCOCET)  MG per tablet Take 1 tablet by mouth 4 (Four) Times a Day As Needed for Moderate Pain . 120 tablet 0 9/9/2019 at Unknown time   • pantoprazole (PROTONIX) 40 MG EC tablet Take 1 tablet by mouth Daily.   9/9/2019 at Unknown time   • polyethylene glycol (MIRALAX) packet Take 17 g by mouth Daily.      • potassium chloride (KLOR-CON) 20 MEQ CR tablet Take 1 tablet by mouth 2 (Two) Times a Day.   9/9/2019 at Unknown time   • raloxifene (EVISTA) 60 MG tablet TAKE ONE TABLET BY MOUTH ONCE DAILY 30 tablet 5  9/9/2019 at Unknown time   • rosuvastatin (CRESTOR) 10 MG tablet Take 1 tablet by mouth Daily.   9/9/2019 at Unknown time   • sertraline (ZOLOFT) 100 MG tablet Take 2 tablets by mouth every night at bedtime. 60 tablet 12 9/9/2019 at Unknown time   • DULoxetine (CYMBALTA) 60 MG capsule Take 1 capsule by mouth 2 (Two) Times a Day.   Taking   • nystatin-triamcinolone (MYCOLOG II) 893188-3.1 UNIT/GM-% cream    Taking   • PROAIR  (90 Base) MCG/ACT inhaler INHALE 2 PUFFS EVERY 6 HOURS AS NEEDED FOR SHORTNESS OF AIR 8.5 g 5 Taking   • rOPINIRole (REQUIP) 4 MG tablet Take 1 tablet by mouth 3 (Three) Times a Day As Needed.   Taking       Scheduled Meds:  calcium-vitamin D 1 tablet Oral Daily   docusate sodium 50 mg Oral BID   DULoxetine 60 mg Oral BID   DULoxetine 60 mg Oral Daily   gabapentin 800 mg Oral Q6H   hydrochlorothiazide 50 mg Oral Daily   metoclopramide 10 mg Intravenous Q6H   nicotine 1 patch Transdermal Q24H   pantoprazole 40 mg Oral Q AM   potassium chloride 20 mEq Oral BID   rosuvastatin 10 mg Oral Daily   sertraline 200 mg Oral Nightly   sodium chloride 10 mL Intravenous Q12H   cyanocobalamin 1,000 mcg Oral Daily     Continuous Infusions:   PRN Meds:.•  acetaminophen **OR** acetaminophen **OR** acetaminophen  •  melatonin  •  methadone  •  nitroglycerin  •  ondansetron **OR** ondansetron  •  oxyCODONE  •  potassium chloride  •  potassium chloride  •  rOPINIRole  •  sodium chloride    ALLERGIES:  Meperidine    ROS:  Review of Systems   Constitutional: Negative for chills and fever.   HENT: Negative for congestion and trouble swallowing.    Respiratory: Positive for cough and shortness of breath.    Cardiovascular: Negative for chest pain and palpitations.   Gastrointestinal: Positive for blood in stool and constipation. Negative for abdominal pain, nausea and vomiting.   Genitourinary: Negative for difficulty urinating, dysuria and hematuria.   Musculoskeletal: Positive for arthralgias and back pain.  "  Neurological: Negative for dizziness and weakness.   Psychiatric/Behavioral: Negative for agitation and confusion.       Objective     Vital Signs:   Visit Vitals  /54   Pulse 78   Temp 98.2 °F (36.8 °C)   Resp 16   Ht 160 cm (63\")   Wt 79.4 kg (175 lb 0.7 oz)   SpO2 94%   BMI 31.01 kg/m²       Physical Exam:      General Appearance:    Awake and alert, in no acute distress   Head:    Normocephalic, without obvious abnormality, atraumatic   Eyes:            Conjunctivae normal, anicteric sclera   Ears:    Ears appear intact with no abnormalities noted   Throat:   No oral lesions, no thrush, oral mucosa moist   Neck:   No adenopathy, supple, no thyromegaly, no JVD   Lungs:    Diminished to auscultation bilaterally, respirations regular, even and unlabored    Heart:    Regular rhythm and normal rate, normal S1 and S2, no            murmur, no gallop, no rub   Chest Wall:    No abnormalities observed   Abdomen:     Normal bowel sounds, soft, non-tender, no rebound or guarding, non-distended, no hepatosplenomegaly   Rectal:     Deferred   Extremities:  Weakness of extremities, no edema, no cyanosis, no             redness   Pulses:   Pulses palpable and equal bilaterally   Skin:   No bleeding, bruising or rash, no jaundice   Lymph nodes:   No palpable adenopathy   Neurologic:   Cranial nerves 2 - 12 grossly intact, no asterixis, sensation intact       Results Review:   I reviewed the patient's labs and imaging.  Lab Results (last 24 hours)     Procedure Component Value Units Date/Time    Iron Profile [[id number]]  (Abnormal) Collected:  09/10/19 0420    Specimen:  Blood Updated:  09/10/19 0943     Iron 10 mcg/dL      Iron Saturation 2 %      Transferrin 290 mg/dL      TIBC 406 mcg/dL     Folate [[id number]]  (Normal) Collected:  09/10/19 0803    Specimen:  Blood Updated:  09/10/19 0912     Folate 15.30 ng/mL     Narrative:       Results may be falsely increased if patient taking Biotin.    Ferritin [[id number]]  " (Abnormal) Collected:  09/10/19 0803    Specimen:  Blood Updated:  09/10/19 0912     Ferritin 8.00 ng/mL      Comment: Results may be falsely decreased if patient taking Biotin.       Vitamin B12 [[id number]]  (Normal) Collected:  09/10/19 0803    Specimen:  Blood Updated:  09/10/19 0912     Vitamin B-12 297 pg/mL      Comment: Results may be falsely increased if patient taking Biotin.       Hemoglobin & Hematocrit, Blood [[id number]]  (Abnormal) Collected:  09/10/19 0749    Specimen:  Blood Updated:  09/10/19 0820     Hemoglobin 6.0 g/dL      Hematocrit 19.5 %     Reticulocytes [[id number]]  (Abnormal) Collected:  09/10/19 0749    Specimen:  Blood Updated:  09/10/19 0814     Reticulocyte % 3.28 %      Reticulocyte Absolute 0.0785 10*6/mm3     Haptoglobin [[id number]] Collected:  09/10/19 0803    Specimen:  Blood Updated:  09/10/19 0813    Comprehensive Metabolic Panel [[id number]]  (Abnormal) Collected:  09/10/19 0420    Specimen:  Blood Updated:  09/10/19 0531     Glucose 94 mg/dL      BUN 11 mg/dL      Creatinine 1.10 mg/dL      Sodium 140 mmol/L      Potassium 3.0 mmol/L      Chloride 106 mmol/L      CO2 25.0 mmol/L      Calcium 8.7 mg/dL      Total Protein 6.5 g/dL      Albumin 3.10 g/dL      ALT (SGPT) 11 U/L      AST (SGOT) 15 U/L      Alkaline Phosphatase 101 U/L      Total Bilirubin 0.5 mg/dL      eGFR Non African Amer 50 mL/min/1.73      Globulin 3.4 gm/dL      A/G Ratio 0.9 g/dL      BUN/Creatinine Ratio 10.0     Anion Gap 12.0 mmol/L     Magnesium [[id number]]  (Normal) Collected:  09/10/19 0420    Specimen:  Blood Updated:  09/10/19 0531     Magnesium 2.0 mg/dL     TSH [[id number]]  (Normal) Collected:  09/10/19 0420    Specimen:  Blood Updated:  09/10/19 0531     TSH 3.890 uIU/mL      Comment: Results may be falsely decreased if patient taking Biotin.       CBC Auto Differential [[id number]]  (Abnormal) Collected:  09/10/19 0420    Specimen:  Blood Updated:  09/10/19 0443     WBC 9.70 10*3/mm3      RBC 2.61  10*6/mm3      Hemoglobin 6.2 g/dL      Hematocrit 20.5 %      MCV 78.8 fL      MCH 23.6 pg      MCHC 30.0 g/dL      RDW 22.6 %      RDW-SD 63.9 fl      MPV 7.9 fL      Platelets 347 10*3/mm3      Neutrophil % 73.7 %      Lymphocyte % 16.2 %      Monocyte % 6.8 %      Eosinophil % 2.7 %      Basophil % 0.6 %      Neutrophils, Absolute 7.10 10*3/mm3      Lymphocytes, Absolute 1.60 10*3/mm3      Monocytes, Absolute 0.70 10*3/mm3      Eosinophils, Absolute 0.30 10*3/mm3      Basophils, Absolute 0.10 10*3/mm3      nRBC 0.1 /100 WBC     Hemoglobin & Hematocrit, Blood [[id number]]  (Abnormal) Collected:  09/10/19 0002    Specimen:  Blood Updated:  09/10/19 0012     Hemoglobin 7.0 g/dL      Hematocrit 23.4 %           Imaging Results (last 24 hours)     ** No results found for the last 24 hours. **             ASSESSMENT AND PLAN:    Severe iron deficiency anemia due to blood loss  Melena and bright red blood per rectum  Hypokalemia  History of recent gastric ulcer and small bowel AVMs  Chronic pain on methadone  Chronic constipation  History of renal cell carcinoma status post right nephrectomy 4 years ago  History of throat cancer 20 years ago  History of back surgery  History of cholecystectomy    Plan:  63-year-old female presented as a direct admit for anemia and continued GI blood loss.  She was taking NSAIDs in addition to aspirin, Plavix, and Eliquis.  We have recommended she stop taking NSAIDs.  Continue PPI twice daily and plan EGD/colonoscopy today.  May need to evaluate necessity of being on aspirin, Plavix, and Eliquis.  Monitor H&H and transfuse as needed.  She is currently receiving packed red blood cell transfusion.  Replace potassium.  Supportive care.    I discussed the patients findings and my recommendations with the patient.  Michelle Davis, YAEL  09/10/19  9:51 AM

## 2019-09-10 NOTE — OP NOTE
COLONOSCOPY, ESOPHAGOGASTRODUODENOSCOPY Procedure Report    Patient Name:  Madalyn Navas  YOB: 1955    Date of Surgery:  9/10/2019     Pre-Op Diagnosis:  Acute GI bleeding [K92.2]  Anemia, unspecified type [D64.9]       Post-Op Diagnosis:  Post-Op Diagnosis Codes:     * Acute GI bleeding [K92.2]     * Anemia, unspecified type [D64.9]      Procedure/CPT® Codes:      Procedure(s):  COLONOSCOPY  ESOPHAGOGASTRODUODENOSCOPY with biopsy X1    Staff:  Surgeon(s):  Nain Rosales MD         Anesthesia: Monitored Anesthesia Care    Implants:    Nothing was implanted during the procedure    Specimen:        See Below    Complications:  None    Description of Procedure:  Description of Procedure:  A description of the procedure as well as risks, benefits and alternative methods were explained to the patient who voiced understanding and signed the corresponding consent form. A physical exam was performed and vital signs were monitored throughout the procedure.    An upper GI endoscope was placed into the mouth and proceeded through the esophagus, stomach and second portion of the duodenum without difficulty. The scope was then retroflexed and the fundus was visualized. The procedure was not difficult and there were no immediate complications.    A rectal exam was performed which was normal. An Olympus colonoscope was placed into the rectum and proceeded under direct visualization through the colon until the cecum and appendiceal orifice were identified. Careful visualization occurred upon slow withdraw of the scope. The scope was then retroflexed and the distal rectum was visualized. The quality of the prep was good. The procedure was not difficult and there were no immediate complications.      Findings:   6 mm clean-based antral ulcer  Gastritis biopsied  Normal colonoscopy    Impression:  Anemia and melena due to gastric ulcer which is currently clean based    Recommendations:  Follow-up biopsy  results  Continue Protonix and Carafate  Repeat colonoscopy in 10 years  We will see as needed in the hospital      Nain Rosales MD     Date: 9/10/2019  Time: 1:02 PM

## 2019-09-10 NOTE — PLAN OF CARE
Problem: Patient Care Overview  Goal: Plan of Care Review  Outcome: Ongoing (interventions implemented as appropriate)   09/10/19 0518   Coping/Psychosocial   Plan of Care Reviewed With patient   Plan of Care Review   Progress declining   OTHER   Outcome Summary Pt hgb 6.2; will receive 1 unit PRBC. Pt rested throughout the night and will be having colonoscopy today       Problem: Pain, Chronic (Adult)  Goal: Identify Related Risk Factors and Signs and Symptoms  Outcome: Ongoing (interventions implemented as appropriate)    Goal: Acceptable Pain/Comfort Level and Functional Ability  Outcome: Ongoing (interventions implemented as appropriate)

## 2019-09-10 NOTE — PROGRESS NOTES
Discharge Planning Assessment  [facility]     Patient Name: Madalyn Navas  MRN: [id number]  Today's Date: 9/10/2019    Admit Date: 9/9/2019    Discharge Needs Assessment     Row Name 09/10/19 1544       Living Environment    Lives With  alone    Primary Care Provided by  self    Quality of Family Relationships  supportive    Able to Return to Prior Arrangements  yes       Resource/Environmental Concerns    Transportation Concerns  car, none       Discharge Needs Assessment    Readmission Within the Last 30 Days  no previous admission in last 30 days    Discharge Coordination/Progress  d/c plan home         Discharge Plan     Row Name 09/10/19 1544       Plan    Plan  pt went colonoscopy today and has a ulcer . hgb 6.2 on admit was given 1 rbc's . d/c plan home     Patient/Family in Agreement with Plan  yes    Plan Comments  pcp dr rose        Destination      No service coordination in this encounter.      Durable Medical Equipment      No service coordination in this encounter.      Dialysis/Infusion      No service coordination in this encounter.      Home Medical Care      No service coordination in this encounter.      Therapy      No service coordination in this encounter.      Community Resources      No service coordination in this encounter.          Demographic Summary    No documentation.       Functional Status    No documentation.       Psychosocial    No documentation.       Abuse/Neglect    No documentation.       Legal    No documentation.       Substance Abuse    No documentation.       Patient Forms    No documentation.           Tara Zavaleta RN

## 2019-09-10 NOTE — ANESTHESIA PREPROCEDURE EVALUATION
Anesthesia Evaluation     NPO Solid Status: > 8 hours  NPO Liquid Status: > 8 hours           Airway   Mallampati: I  TM distance: >3 FB  Neck ROM: full  No difficulty expected  Dental - normal exam     Pulmonary - normal exam   Cardiovascular - normal exam    (+) PVD, hyperlipidemia,  carotid artery disease      Neuro/Psych  GI/Hepatic/Renal/Endo    (+)  GERD, GI bleeding,     Musculoskeletal     Abdominal  - normal exam    Bowel sounds: normal.   Substance History      OB/GYN          Other                      Anesthesia Plan    ASA 3     MAC     intravenous induction   Anesthetic plan, all risks, benefits, and alternatives have been provided, discussed and informed consent has been obtained with: patient.

## 2019-09-10 NOTE — DISCHARGE SUMMARY
Date of Admission: 9/9/2019    Date of Discharge:  9/10/2019    Length of stay:  LOS: 1 day     Admission Diagnosis:   Anemia  GI Bleed    Discharge Diagnosis:     Anemia and GI bleed d/t clean based antral ulcer  - biopsy taken and pending  - s/p EGD and colonoscopy 9/10/19  - protonix bid  - carafate achs  - f/u GI in one week  - ok to continue anticoagulation per GI     Iron deficiency anemia  - continue supplements  - consider hematology follow up   - s/p 1unit pRBC 9/10/19 with improvement of hgb    Chronic pain  - continue methadone, percocet, gabapentin (INSPECT verified)     Depression/Anxiety  - continue zoloft, cymbalta     RLS  - continue requip     HTN, HLP  - continue home meds     S/p right nephrectomy d/t renal cell carcinoma     Hx of throat cancer       Hospital Course:  Patient is a 63 y.o. female with multiple medical problems who presented as a direct admission from gastroenterology for a low hemoglobin. Patient reports she has been having dark black stools for the past month with occasional brbpr. She reports a colonoscopy 3-4 years ago. She denies any abdominal pain, nausea/ vomiting. Denies chest pain, palpitations or sob.    Patient underwent EGD and colonoscopy and was found to have an antral ulcer, biopsies were taken. She will be continued on ppi bid and carafate. She received a unit of pRBC and had improvement of her hemoglobin. She is to follow up with GI in one week.        Procedures Performed:    Procedure(s):  COLONOSCOPY  ESOPHAGOGASTRODUODENOSCOPY with biopsy X1  -------------------       Consults:   Consults     Date and Time Order Name Status Description    9/9/2019 1730 Inpatient Gastroenterology Consult            Vital Signs:  Temp:  [97.2 °F (36.2 °C)-98.6 °F (37 °C)] 97.2 °F (36.2 °C)  Heart Rate:  [] 84  Resp:  [11-16] 16  BP: ()/(51-66) 126/66      Physical Exam:  Physical Exam   Constitutional: She appears well-developed and well-nourished.   Cardiovascular:  Normal rate and regular rhythm.   Pulmonary/Chest: Effort normal and breath sounds normal.   Abdominal: Soft. Bowel sounds are normal. She exhibits no distension. There is no tenderness. There is no guarding.             Pertinent Test Results:   Lab Results (last 72 hours)     Procedure Component Value Units Date/Time    Hemoglobin & Hematocrit, Blood [[id number]]  (Abnormal) Collected:  09/10/19 1431    Specimen:  Blood Updated:  09/10/19 1523     Hemoglobin 7.7 g/dL      Hematocrit 25.1 %     Iron Profile [[id number]]  (Abnormal) Collected:  09/10/19 0420    Specimen:  Blood Updated:  09/10/19 0943     Iron 10 mcg/dL      Iron Saturation 2 %      Transferrin 290 mg/dL      TIBC 406 mcg/dL     Folate [[id number]]  (Normal) Collected:  09/10/19 0803    Specimen:  Blood Updated:  09/10/19 0912     Folate 15.30 ng/mL     Narrative:       Results may be falsely increased if patient taking Biotin.    Ferritin [[id number]]  (Abnormal) Collected:  09/10/19 0803    Specimen:  Blood Updated:  09/10/19 0912     Ferritin 8.00 ng/mL      Comment: Results may be falsely decreased if patient taking Biotin.       Vitamin B12 [[id number]]  (Normal) Collected:  09/10/19 0803    Specimen:  Blood Updated:  09/10/19 0912     Vitamin B-12 297 pg/mL      Comment: Results may be falsely increased if patient taking Biotin.       Hemoglobin & Hematocrit, Blood [[id number]]  (Abnormal) Collected:  09/10/19 0749    Specimen:  Blood Updated:  09/10/19 0820     Hemoglobin 6.0 g/dL      Hematocrit 19.5 %     Reticulocytes [[id number]]  (Abnormal) Collected:  09/10/19 0749    Specimen:  Blood Updated:  09/10/19 0814     Reticulocyte % 3.28 %      Reticulocyte Absolute 0.0785 10*6/mm3     Haptoglobin [[id number]] Collected:  09/10/19 0803    Specimen:  Blood Updated:  09/10/19 0813    Comprehensive Metabolic Panel [[id number]]  (Abnormal) Collected:  09/10/19 0420    Specimen:  Blood Updated:  09/10/19 0531     Glucose 94 mg/dL      BUN 11 mg/dL       Creatinine 1.10 mg/dL      Sodium 140 mmol/L      Potassium 3.0 mmol/L      Chloride 106 mmol/L      CO2 25.0 mmol/L      Calcium 8.7 mg/dL      Total Protein 6.5 g/dL      Albumin 3.10 g/dL      ALT (SGPT) 11 U/L      AST (SGOT) 15 U/L      Alkaline Phosphatase 101 U/L      Total Bilirubin 0.5 mg/dL      eGFR Non African Amer 50 mL/min/1.73      Globulin 3.4 gm/dL      A/G Ratio 0.9 g/dL      BUN/Creatinine Ratio 10.0     Anion Gap 12.0 mmol/L     Magnesium [[id number]]  (Normal) Collected:  09/10/19 0420    Specimen:  Blood Updated:  09/10/19 0531     Magnesium 2.0 mg/dL     TSH [[id number]]  (Normal) Collected:  09/10/19 0420    Specimen:  Blood Updated:  09/10/19 0531     TSH 3.890 uIU/mL      Comment: Results may be falsely decreased if patient taking Biotin.       CBC Auto Differential [[id number]]  (Abnormal) Collected:  09/10/19 0420    Specimen:  Blood Updated:  09/10/19 0443     WBC 9.70 10*3/mm3      RBC 2.61 10*6/mm3      Hemoglobin 6.2 g/dL      Hematocrit 20.5 %      MCV 78.8 fL      MCH 23.6 pg      MCHC 30.0 g/dL      RDW 22.6 %      RDW-SD 63.9 fl      MPV 7.9 fL      Platelets 347 10*3/mm3      Neutrophil % 73.7 %      Lymphocyte % 16.2 %      Monocyte % 6.8 %      Eosinophil % 2.7 %      Basophil % 0.6 %      Neutrophils, Absolute 7.10 10*3/mm3      Lymphocytes, Absolute 1.60 10*3/mm3      Monocytes, Absolute 0.70 10*3/mm3      Eosinophils, Absolute 0.30 10*3/mm3      Basophils, Absolute 0.10 10*3/mm3      nRBC 0.1 /100 WBC     Hemoglobin & Hematocrit, Blood [[id number]]  (Abnormal) Collected:  09/10/19 0002    Specimen:  Blood Updated:  09/10/19 0012     Hemoglobin 7.0 g/dL      Hematocrit 23.4 %                      Imaging Results (all)     None                Discharge Disposition:  Home or Self Care    Discharge Medications:     Discharge Medications      New Medications      Instructions Start Date   sucralfate 1 g tablet  Commonly known as:  CARAFATE   1 g, Oral, 4 Times Daily Before Meals &  Nightly         Changes to Medications      Instructions Start Date   pantoprazole 40 MG EC tablet  Commonly known as:  PROTONIX  What changed:  when to take this   40 mg, Oral, 2 Times Daily         Continue These Medications      Instructions Start Date   aspirin 325 MG tablet   1 tablet, Oral, Daily      CALCIUM 500 + D 500-125 MG-UNIT tablet  Generic drug:  Calcium Carbonate-Vitamin D   1 tablet, Oral, Daily      clopidogrel 75 MG tablet  Commonly known as:  PLAVIX   TAKE ONE TABLET BY MOUTH ONCE DAILY      CRESTOR 10 MG tablet  Generic drug:  rosuvastatin   1 tablet, Oral, Daily      cyanocobalamin 500 MCG tablet  Commonly known as:  VITAMIN B-12   2 tablets, Oral, Daily      DULoxetine 60 MG capsule  Commonly known as:  CYMBALTA   60 mg, Oral, Daily      CYMBALTA 60 MG capsule  Generic drug:  DULoxetine   1 capsule, Oral, 2 Times Daily      docusate sodium 50 MG capsule  Commonly known as:  COLACE   1 capsule, Oral, 2 Times Daily      ELIQUIS 5 MG tablet tablet  Generic drug:  apixaban   TAKE ONE TABLET BY MOUTH TWO TIMES A DAY      ferrous sulfate 325 (65 FE) MG tablet   1 tablet, Oral, 2 Times Daily      gabapentin 800 MG tablet  Commonly known as:  NEURONTIN   TAKE ONE TABLET BY MOUTH FOUR TIMES A DAY      hydrochlorothiazide 50 MG tablet  Commonly known as:  HYDRODIURIL   TAKE ONE TABLET BY MOUTH ONCE DAILY      KLOR-CON 20 MEQ CR tablet  Generic drug:  potassium chloride   1 tablet, Oral, 2 Times Daily      l-methylfolate-B6-B12 1.13-25-2 MG tablet tablet   1 tablet, Oral, Daily      LINZESS 290 MCG capsule capsule  Generic drug:  linaclotide   1 capsule, Oral, Daily      methadone 10 MG tablet  Commonly known as:  DOLOPHINE   10 mg, Oral, Every 8 Hours PRN      nystatin-triamcinolone 313550-0.1 UNIT/GM-% cream  Commonly known as:  MYCOLOG II       oxyCODONE-acetaminophen  MG per tablet  Commonly known as:  PERCOCET   1 tablet, Oral, 4 Times Daily PRN      polyethylene glycol packet  Commonly known  as:  MIRALAX   17 g, Oral, Daily      PROAIR  (90 Base) MCG/ACT inhaler  Generic drug:  albuterol sulfate HFA   INHALE 2 PUFFS EVERY 6 HOURS AS NEEDED FOR SHORTNESS OF AIR      raloxifene 60 MG tablet  Commonly known as:  EVISTA   TAKE ONE TABLET BY MOUTH ONCE DAILY      REQUIP 4 MG tablet  Generic drug:  rOPINIRole   1 tablet, Oral, 3 Times Daily PRN      sertraline 100 MG tablet  Commonly known as:  ZOLOFT   200 mg, Oral, Every Night at Bedtime             Discharge Diet:   Diet Instructions     Diet: Regular      Discharge Diet:  Regular          Activity at Discharge:   Activity Instructions     Activity as Tolerated            Follow-up Appointments:  Future Appointments   Date Time Provider Department Center   9/13/2019 11:30 AM Shahriar Espitia MD [facility] None   9/24/2019 11:00 AM Hollis Adrian MD [facility]         Test Results Pending at Discharge:  Gastric biopsy    Condition on Discharge:    Stable      Risk for Readmission (LACE): Score: 7 (9/10/2019  6:01 AM)          Roberto Ricketts DO  09/10/19  3:50 PM    Time: Discharge 31 min with face-to-face history/exam, writing all prescriptions, and documenting discharge data including care coordination

## 2019-09-11 NOTE — OUTREACH NOTE
Prep Survey      Responses   Facility patient discharged from?  [facility]   Is patient eligible?  Yes   Discharge diagnosis  lower GI bleed, anemia   Does the patient have one of the following disease processes/diagnoses(primary or secondary)?  Other   Does the patient have Home health ordered?  No   Is there a DME ordered?  No   Prep survey completed?  Yes          Katia Lei RN

## 2019-09-11 NOTE — OUTREACH NOTE
Spoke with pt, states she is feeling better, but still fatigued. She has not yet started new medication, Carafate, but will p/u from phar today. Confirmed appt with Dr Adrian for 09/24/19.

## 2019-09-11 NOTE — PROGRESS NOTES
Patient needs to be seen within 7 days of discharge with hx of GI Bleed. She needs to be scheduled as a hospital f/u which should be 30 min.

## 2019-09-13 NOTE — PROGRESS NOTES
Date of Office Visit: 2019  Encounter Provider: Shahriar Espitia MD  Place of Service: [facility] CARDIOLOG[facility] [place]  Patient Name: Madalyn Navas  :1955  Hollis Adrian MD    Chief Complaint   Patient presents with   • Hypertension  Preoperative clearance  Shortness of breath  Carotid artery disease     consult; clearance     History of Present Illness    I am pleased to see Mrs. Navas in my office today as a new consultation.    As you know, patient is 63-year-old white female whose past medical history significant for hypertension, hyperlipidemia, carotid artery disease, who came today for preop clearance.    Patient has advanced osteoarthritis of the back and is being considered for back surgery by Dr. Marie.  Patient came for preop clearance.    Patient is very limited because of her advanced osteoarthritis.  Patient denies any active chest pain.  Patient does complain of shortness of breath.  Patient denies any orthopnea, PND, syncope or presyncope.  No leg edema noted.  Patient does complain of involuntary movement of the left arm associated with numbness.  Patient does not lose the consciousness at that time.  Patient has no previous history of seizure    Patient does not have previous history of CAD, PCI or MI.    EKG showed sinus rhythm    I would recommend that patient should proceed with stress test and echocardiogram.  I would also refer the patient to neurologist.    Past Medical History:   Diagnosis Date   • Abnormal mammogram    • Acute bronchitis    • Acute non-infective otitis externa of right ear     Unspecified.    • Anemia, unspecified     Impression: recheck   • Angular cheilitis    • Bloody stool     Impression: recommend GI referral.   • Breast mass, left     Impression: abcess on ultrasound. Needs follow up diagnostic mammo   • Cellulitis of right upper extremity     Impression: at previous IV site. start atbx, heat.   • Cerebral ischemia     Other specified transient  cerebral ischemias   • Cerebrovascular accident, old     Impression: followed by neurology. Has appt in Feb   • Cervical cancer screening     Impression: s/p hysterectomy   • Cervical disc disorder with myelopathy     Impression: currently undergoing accupuncture which works well.   • Closed fracture of bone     Foot.    • Cough     Impression: likely reactive   • Cramps, muscle, general     Impression: check labs   • Degeneration of cervical intervertebral disc     Impression: try increasing requip due to RLS   • Degenerative disc disease, lumbar    • Diaphragmatic hernia    • Edema     Impression: dependent. BP noted. Baseline labs ok. on hctz, try increase to 50 mg qd   • Extrapyramidal and movement disorders in diseases classified elsewhere    • Fever presenting with conditions classified elsewhere    • Folliculitis     Impression: right chin. Try bactroban Follow-up in 2 weeks for reevaluation, sooner for concerns.   • Fracture of right hip, closed, with nonunion, subsequent encounter     Impression: June 2015 Dr Carrera. Will get records   • Fracture, intertrochanteric, right femur (CMS/HCC)    • Fusion of lumbar spine     Story: and 12/2013   • Gastritis with hemorrhage     Unspecified chronicity, unspecified gastritis type. Impression: GI bleed noted on camera evaluation, followed by GI   • GERD (gastroesophageal reflux disease)    • H/O total hysterectomy with bilateral salpingo-oophorectomy (BSO)    • Hip pain, left     Impression: will re-xray   • History of Nissen fundoplication    • History of tobacco use    • Hx of cholecystectomy     Open.   • Hypokalemia     Impression: continue potassium, recheck in 2 weeks   • Initial Medicare annual wellness visit    • Internal derangement of shoulder, right     Impression: MRI showed labral tears, surger scheduled 11/7/17 by Dr David   • Iron deficiency anemia, unspecified     Impression: recommended venofer. Pt would like to discuss with specialist. GI AVMs cause  chronic blood loss. Followed by GI   • Irritable bowel syndrome without diarrhea     Impression: constipation predominant   • Knee pain    • Laryngeal cancer (CMS/HCC)    • Leg pain     Impression: 10 day history   • Lumbar disc disorder with myelopathy     Impression: failed fentanyl, go back to methadone. improved with epidurals Pt would like referral to Dr Abdi for opinion.   • Malaise and fatigue    • Malignant neoplasm of thyroid gland (CMS/HCC)    • Medicare annual wellness visit, subsequent     With abnormal findings   • Menopausal syndrome    • Mixed hyperlipidemia     Story: Stable, Compliant with meds because Is getting adequate diet and exercise Goals developed at last visit were met because Follow up in months Care management needs are self addressed. Would benefit from care management Self-Management abilities addressed and patient is capable of managing his/her own disease   • Multiple pathological fractures     Sequela. Impression: dexa, calcium, mag normal. Recc ortho eval   • Neuralgia    • Neuropathy     Impression: try foltx   • Occlusion of left carotid artery     Impression: nonsurgicsl. Seen by vascular   • Orthostatic hypotension    • Osteoporosis     Impression: recheck dexa   • Other specified abdominal hernia without obstruction or gangrene     Impression: most likely. Findings discussed. All questions answered. Reassurance, education. Consider CT if changes. Pt not interested in surgical repair   • Other specified persistent mood disorders (CMS/[facility])     Story: anxiety with panic attack. Impression: worsening. Try zoloft. Follow-up in one month, sooner for concerns. Watch for serotonin sx given concurrent cymbalta   • Overweight (BMI 25.0-29.9)    • Pain of both hip joints     Impression: refer to Dr Marcano   • Pelvic fracture (CMS/[facility])    • Polyarthralgia     Story: fibromyalgia   • Positive depression screening    • S/P lumbar spine operation    • Screening for alcoholism    • Screening  for osteoporosis    • Seroma, post-traumatic (CMS/HCC)     Impression: likely at site of nephrectomy. Findings discussed. All questions answered. Empiric antibiotics. Follow-up for routine health maintenance as directed. Keep appt with urologist   • Sinusitis    • Tobacco use    • Tremor     Story: unclear if intermittent tremor central from petit mal or focal seizure vs local nerve irritation. Findings discussed. All questions answered. Differential diagnosis discussed Impression: left sided. Try increasing requip to 4 mg tid. Continue gabapentin. May need neurology eval for treatment options. Will get EEgprior to eval for petit mal    • Unsteady gait    • UTI (urinary tract infection)    • Visit for screening mammogram          Past Surgical History:   Procedure Laterality Date   • COLONOSCOPY N/A 9/10/2019    Procedure: COLONOSCOPY;  Surgeon: Nain Rosales MD;  Location: [facility] ENDOSCOPY;  Service: Gastroenterology   • ENDOSCOPY N/A 9/10/2019    Procedure: ESOPHAGOGASTRODUODENOSCOPY with biopsy X1;  Surgeon: Nain Rosales MD;  Location: [facility] ENDOSCOPY;  Service: Gastroenterology   • HIP SURGERY Right     ORIF right hip   • NEPHRECTOMY  07/09/2013    Right, [facility].   • SHOULDER SURGERY Right 11/07/2017    arthroplasty   • TOTAL ABDOMINAL HYSTERECTOMY WITH SALPINGO OOPHORECTOMY             Current Outpatient Medications:   •  aspirin 325 MG tablet, Take 1 tablet by mouth Daily., Disp: , Rfl:   •  Calcium Carbonate-Vitamin D (CALCIUM 500 + D) 500-125 MG-UNIT tablet, Take 1 tablet by mouth Daily., Disp: , Rfl:   •  clopidogrel (PLAVIX) 75 MG tablet, TAKE ONE TABLET BY MOUTH ONCE DAILY, Disp: 30 tablet, Rfl: 5  •  cyanocobalamin (VITAMIN B-12) 500 MCG tablet, Take 2 tablets by mouth Daily., Disp: , Rfl:   •  DULoxetine (CYMBALTA) 60 MG capsule, Take 1 capsule by mouth 2 (Two) Times a Day., Disp: , Rfl:   •  ELIQUIS 5 MG tablet tablet, TAKE ONE TABLET BY MOUTH TWO TIMES A DAY, Disp: 60 tablet,  Rfl: 5  •  ferrous sulfate 325 (65 FE) MG tablet, Take 1 tablet by mouth 2 (Two) Times a Day., Disp: , Rfl:   •  gabapentin (NEURONTIN) 800 MG tablet, TAKE ONE TABLET BY MOUTH FOUR TIMES A DAY, Disp: 120 tablet, Rfl: 12  •  hydrochlorothiazide (HYDRODIURIL) 50 MG tablet, TAKE ONE TABLET BY MOUTH ONCE DAILY, Disp: 30 tablet, Rfl: 5  •  linaclotide (LINZESS) 290 MCG capsule capsule, Take 1 capsule by mouth Daily., Disp: , Rfl:   •  methadone (DOLOPHINE) 10 MG tablet, Take 1 tablet by mouth Every 8 (Eight) Hours As Needed for Moderate Pain  or Severe Pain ,, Disp: 90 tablet, Rfl: 0  •  nystatin-triamcinolone (MYCOLOG II) 679441-3.1 UNIT/GM-% cream, , Disp: , Rfl:   •  oxyCODONE-acetaminophen (PERCOCET)  MG per tablet, Take 1 tablet by mouth 4 (Four) Times a Day As Needed for Moderate Pain ., Disp: 120 tablet, Rfl: 0  •  pantoprazole (PROTONIX) 40 MG EC tablet, Take 1 tablet by mouth 2 (Two) Times a Day for 30 days., Disp: 60 tablet, Rfl: 0  •  potassium chloride (KLOR-CON) 20 MEQ CR tablet, Take 1 tablet by mouth 2 (Two) Times a Day., Disp: , Rfl:   •  PROAIR  (90 Base) MCG/ACT inhaler, INHALE 2 PUFFS EVERY 6 HOURS AS NEEDED FOR SHORTNESS OF AIR, Disp: 8.5 g, Rfl: 5  •  raloxifene (EVISTA) 60 MG tablet, TAKE ONE TABLET BY MOUTH ONCE DAILY, Disp: 30 tablet, Rfl: 5  •  rOPINIRole (REQUIP) 4 MG tablet, Take 1 tablet by mouth 3 (Three) Times a Day As Needed., Disp: , Rfl:   •  rosuvastatin (CRESTOR) 10 MG tablet, Take 1 tablet by mouth Daily., Disp: , Rfl:   •  sertraline (ZOLOFT) 100 MG tablet, Take 2 tablets by mouth every night at bedtime., Disp: 60 tablet, Rfl: 12  •  sucralfate (CARAFATE) 1 g tablet, Take 1 tablet by mouth 4 (Four) Times a Day Before Meals & at Bedtime for 30 days., Disp: 120 tablet, Rfl: 0      Social History     Socioeconomic History   • Marital status:      Spouse name: Not on file   • Number of children: Not on file   • Years of education: Not on file   • Highest education  "level: Not on file   Tobacco Use   • Smoking status: Current Every Day Smoker     Packs/day: 1.00     Types: Cigarettes   Substance and Sexual Activity   • Alcohol use: No     Frequency: Never   • Drug use: No         Review of Systems   Constitution: Negative for chills and fever.   HENT: Negative for ear discharge and nosebleeds.    Eyes: Negative for discharge and redness.   Cardiovascular: Negative for chest pain, orthopnea, palpitations, paroxysmal nocturnal dyspnea and syncope.   Respiratory: Positive for shortness of breath. Negative for cough and wheezing.    Endocrine: Negative for heat intolerance.   Skin: Negative for rash.   Musculoskeletal: Negative for arthritis and myalgias.   Gastrointestinal: Negative for abdominal pain, melena, nausea and vomiting.   Genitourinary: Negative for dysuria and hematuria.   Neurological: Positive for tremors. Negative for dizziness, light-headedness and numbness.   Psychiatric/Behavioral: Negative for depression. The patient is nervous/anxious.        Procedures    Procedures    No orders to display           Objective:    /70   Pulse 90   Ht 160 cm (63\")   Wt 77.1 kg (170 lb)   BMI 30.11 kg/m²         Physical Exam   Constitutional: She is oriented to person, place, and time. She appears well-developed and well-nourished.   HENT:   Head: Normocephalic and atraumatic.   Eyes: No scleral icterus.   Neck: No thyromegaly present.   Cardiovascular: Normal rate, regular rhythm and normal heart sounds. Exam reveals no gallop and no friction rub.   No murmur heard.  Pulmonary/Chest: Effort normal and breath sounds normal. No respiratory distress. She has no wheezes. She has no rales.   Abdominal: There is no tenderness.   Musculoskeletal: She exhibits no edema.   Lymphadenopathy:     She has no cervical adenopathy.   Neurological: She is alert and oriented to person, place, and time.   Skin: No rash noted. No erythema.   Psychiatric: She has a normal mood and affect. "           Assessment:       Diagnosis Plan   1. Preoperative cardiovascular examination  Stress Test With Myocardial Perfusion One Day    Adult Transthoracic Echo Complete W/ Cont if Necessary Per Protocol   2. Shortness of breath  Stress Test With Myocardial Perfusion One Day    Adult Transthoracic Echo Complete W/ Cont if Necessary Per Protocol   3. Mixed hyperlipidemia  Stress Test With Myocardial Perfusion One Day    Adult Transthoracic Echo Complete W/ Cont if Necessary Per Protocol   4. Essential hypertension  Stress Test With Myocardial Perfusion One Day    Adult Transthoracic Echo Complete W/ Cont if Necessary Per Protocol            Plan:       I would recommend to proceed with stress test and echocardiogram.  I would recommend to proceed with neurology consultation because of involuntary movements of her left arm.

## 2019-09-16 NOTE — PROGRESS NOTES
Subjective   Madalyn Navas is a 63 y.o. female.     Follow up from [facility] stay on 9/9-9/10, dx with anemia and GI bleed. Reports she's feeling anxious but physically feels well.    Has a stress test next Monday, and an echo next Thursday. If both look good abd labs stable, will be cleared for back surgery.       GI Problem   Primary symptoms do not include fever, fatigue, abdominal pain, nausea, vomiting, diarrhea, dysuria or rash. The illness began 6 to 7 days ago.   The illness does not include constipation.        The following portions of the patient's history were reviewed and updated as appropriate: allergies, current medications, past family history, past medical history, past social history, past surgical history and problem list.    Patient Active Problem List   Diagnosis   • Abnormal mammogram   • Anemia   • Angular cheilitis   • Breast mass, left   • Cerebral infarction due to unspecified occlusion or stenosis of unspecified carotid artery (CMS/HCC)   • Spinal stenosis of cervical region   • Degenerative joint disease of right acromioclavicular joint   • Cellulitis of right upper extremity   • Diaphragmatic hernia   • Edema   • Extrapyramidal and movement disorders in diseases classified elsewhere   • Fusion of lumbar spine   • Gastritis with hemorrhage   • Gastroesophageal reflux disease   • H/O total hysterectomy with bilateral salpingo-oophorectomy (BSO)   • History of Nissen fundoplication   • Hyperlipidemia   • Hypokalemia   • Malaise and fatigue   • Irritable bowel syndrome without diarrhea   • Laryngeal cancer (CMS/HCC)   • Malignant neoplasm of thyroid gland (CMS/HCC)   • Menopausal syndrome   • Multiple pathological fractures   • Neuralgia   • Neuropathy   • Occlusion of left carotid artery   • Osteoarthritis   • Other cervical disc degeneration, unspecified cervical region   • Iron deficiency anemia   • Other postprocedural states   • Other specified persistent mood disorders (CMS/HCC)   • Multiple  joint pain   • Tremor   • Unsteady gait   • Pain of left upper extremity   • Flat back syndrome   • Lumbar stenosis with neurogenic claudication   • Vascular disease   • Acute GI bleeding       Current Outpatient Medications on File Prior to Visit   Medication Sig Dispense Refill   • aspirin 325 MG tablet Take 1 tablet by mouth Daily.     • Calcium Carbonate-Vitamin D (CALCIUM 500 + D) 500-125 MG-UNIT tablet Take 1 tablet by mouth Daily.     • clopidogrel (PLAVIX) 75 MG tablet TAKE ONE TABLET BY MOUTH ONCE DAILY 30 tablet 5   • cyanocobalamin (VITAMIN B-12) 500 MCG tablet Take 2 tablets by mouth Daily.     • DULoxetine (CYMBALTA) 60 MG capsule Take 1 capsule by mouth 2 (Two) Times a Day.     • ELIQUIS 5 MG tablet tablet TAKE ONE TABLET BY MOUTH TWO TIMES A DAY 60 tablet 5   • ferrous sulfate 325 (65 FE) MG tablet Take 1 tablet by mouth 2 (Two) Times a Day.     • gabapentin (NEURONTIN) 800 MG tablet TAKE ONE TABLET BY MOUTH FOUR TIMES A  tablet 12   • hydrochlorothiazide (HYDRODIURIL) 50 MG tablet TAKE ONE TABLET BY MOUTH ONCE DAILY 30 tablet 5   • linaclotide (LINZESS) 290 MCG capsule capsule Take 1 capsule by mouth Daily.     • nystatin-triamcinolone (MYCOLOG II) 536934-2.1 UNIT/GM-% cream      • pantoprazole (PROTONIX) 40 MG EC tablet Take 1 tablet by mouth 2 (Two) Times a Day for 30 days. 60 tablet 0   • potassium chloride (KLOR-CON) 20 MEQ CR tablet Take 1 tablet by mouth 2 (Two) Times a Day.     • PROAIR  (90 Base) MCG/ACT inhaler INHALE 2 PUFFS EVERY 6 HOURS AS NEEDED FOR SHORTNESS OF AIR 8.5 g 5   • raloxifene (EVISTA) 60 MG tablet TAKE ONE TABLET BY MOUTH ONCE DAILY 30 tablet 5   • rOPINIRole (REQUIP) 4 MG tablet Take 1 tablet by mouth 3 (Three) Times a Day As Needed.     • rosuvastatin (CRESTOR) 10 MG tablet Take 1 tablet by mouth Daily.     • sertraline (ZOLOFT) 100 MG tablet Take 2 tablets by mouth every night at bedtime. 60 tablet 12   • sucralfate (CARAFATE) 1 g tablet Take 1 tablet by  mouth 4 (Four) Times a Day Before Meals & at Bedtime for 30 days. 120 tablet 0   • [DISCONTINUED] methadone (DOLOPHINE) 10 MG tablet Take 1 tablet by mouth Every 8 (Eight) Hours As Needed for Moderate Pain  or Severe Pain , 90 tablet 0   • [DISCONTINUED] oxyCODONE-acetaminophen (PERCOCET)  MG per tablet Take 1 tablet by mouth 4 (Four) Times a Day As Needed for Moderate Pain . 120 tablet 0     No current facility-administered medications on file prior to visit.        Allergies   Allergen Reactions   • Meperidine Other (See Comments)       Review of Systems   Constitutional: Negative for activity change, appetite change, fatigue and fever.   HENT: Negative for ear pain, swollen glands and voice change.    Eyes: Negative for visual disturbance.   Respiratory: Negative for shortness of breath and wheezing.    Cardiovascular: Negative for chest pain and leg swelling.   Gastrointestinal: Negative for abdominal pain, blood in stool, constipation, diarrhea, nausea and vomiting.   Endocrine: Negative for polydipsia and polyuria.   Genitourinary: Negative for dysuria, frequency and hematuria.   Musculoskeletal: Negative for joint swelling, neck pain and neck stiffness.   Skin: Negative for rash and bruise.   Neurological: Negative for weakness, numbness and headache.   Psychiatric/Behavioral: Negative for suicidal ideas and depressed mood.       Objective   Physical Exam   Constitutional: She is oriented to person, place, and time. She appears well-developed and well-nourished.   HENT:   Head: Normocephalic and atraumatic.   Left Ear: External ear normal.   Nose: Nose normal.   Mouth/Throat: Oropharynx is clear and moist.   Eyes: EOM are normal. Pupils are equal, round, and reactive to light.   Neck: Normal range of motion. Neck supple.   Cardiovascular: Normal rate, regular rhythm and normal heart sounds.   Pulmonary/Chest: Effort normal.   Abdominal: Soft. Bowel sounds are normal.   Neurological: She is alert and  oriented to person, place, and time.   Skin: Skin is warm and dry.   Psychiatric: She has a normal mood and affect. Her behavior is normal. Judgment and thought content normal.         Assessment/Plan .  Problem List Items Addressed This Visit     Spinal stenosis of cervical region    Relevant Medications    oxyCODONE-acetaminophen (PERCOCET)  MG per tablet    methadone (DOLOPHINE) 10 MG tablet    Hypokalemia    Overview     continue potassium, recheck in 2 weeks         Relevant Orders    Basic Metabolic Panel    Laryngeal cancer (CMS/HCC)    Relevant Medications    oxyCODONE-acetaminophen (PERCOCET)  MG per tablet    methadone (DOLOPHINE) 10 MG tablet    Malignant neoplasm of thyroid gland (CMS/HCC)    Relevant Medications    oxyCODONE-acetaminophen (PERCOCET)  MG per tablet    methadone (DOLOPHINE) 10 MG tablet    Multiple pathological fractures    Overview     dexa, calcium, mag normal. Recc ortho eval         Relevant Medications    oxyCODONE-acetaminophen (PERCOCET)  MG per tablet    methadone (DOLOPHINE) 10 MG tablet    Osteoarthritis    Relevant Medications    oxyCODONE-acetaminophen (PERCOCET)  MG per tablet    methadone (DOLOPHINE) 10 MG tablet    Other cervical disc degeneration, unspecified cervical region    Relevant Medications    oxyCODONE-acetaminophen (PERCOCET)  MG per tablet    methadone (DOLOPHINE) 10 MG tablet    Iron deficiency anemia - Primary    Overview     recommended venofer. Pt would like to discuss with specialist.    GI AVMs cause chronic blood loss. Followed by GI         Relevant Orders    CBC Auto Differential    Other specified persistent mood disorders (CMS/HCC)    Overview     worsening. Try zoloft. Follow-up in one month, sooner for concerns. Watch for serotonin sx given concurrent cymbalta  anxiety with panic attack         Relevant Medications    oxyCODONE-acetaminophen (PERCOCET)  MG per tablet    methadone (DOLOPHINE) 10 MG tablet     Multiple joint pain    Overview     fibromyalgia         Relevant Medications    oxyCODONE-acetaminophen (PERCOCET)  MG per tablet    methadone (DOLOPHINE) 10 MG tablet    Unsteady gait    Relevant Medications    oxyCODONE-acetaminophen (PERCOCET)  MG per tablet    methadone (DOLOPHINE) 10 MG tablet      Overall stable today. Recheck labs. Follow-up after testing complete, sooner for worsening symptoms or any concerns

## 2019-09-17 NOTE — TELEPHONE ENCOUNTER
----- Message from Hollis Adrian MD sent at 9/17/2019  9:58 AM EDT -----  Please notify patient that  Hgb is 7.1, may need transfusion if continues to drift down. Recheck in 2 weeks a cbc

## 2019-09-20 NOTE — OUTREACH NOTE
Medical Week 2 Survey      Responses   Facility patient discharged from?  [facility]   Does the patient have one of the following disease processes/diagnoses(primary or secondary)?  Other   Week 2 attempt successful?  Yes   Call start time  1345   Discharge diagnosis  lower GI bleed, anemia   Call end time  1347   Meds reviewed with patient/caregiver?  Yes   Is the patient having any side effects they believe may be caused by any medication additions or changes?  No   Does the patient have all medications ordered at discharge?  Yes   Is the patient taking all medications as directed (includes completed medication regime)?  Yes   Does the patient have a primary care provider?   Yes   Does the patient have an appointment with their PCP within 7 days of discharge?  Yes   Has the patient kept scheduled appointments due by today?  Yes   Has home health visited the patient within 72 hours of discharge?  N/A   Did the patient receive a copy of their discharge instructions?  Yes   Nursing interventions  Reviewed instructions with patient   What is the patient's perception of their health status since discharge?  Improving   Is the patient/caregiver able to teach back signs and symptoms related to disease process for when to call PCP?  Yes   Is the patient/caregiver able to teach back signs and symptoms related to disease process for when to call 911?  Yes   Is the patient/caregiver able to teach back the hierarchy of who to call/visit for symptoms/problems? PCP, Specialist, Home health nurse, Urgent Care, ED, 911  Yes   Week 2 Call Completed?  Yes   Graduated  Yes   Did the patient feel the follow up calls were helpful during their recovery period?  Yes   Was the number of calls appropriate?  Yes          Celestina Carranza LPN

## 2019-10-01 PROBLEM — R93.89 ABNORMAL CXR: Status: ACTIVE | Noted: 2019-01-01

## 2019-10-01 NOTE — PROGRESS NOTES
Subjective   Madalyn Navas is a 64 y.o. female.     Following up from [facility] stay on 9/27/19 due to GI bleed.  Found to have an abnormal cxr at [facility]. Last cxr that I can find was 2015 and normal.       GI Problem   The primary symptoms include fatigue and arthralgias. Primary symptoms do not include fever, abdominal pain, nausea, vomiting, diarrhea, dysuria or rash. The illness began 3 to 5 days ago.   The illness is also significant for back pain. The illness does not include constipation.        The following portions of the patient's history were reviewed and updated as appropriate: allergies, current medications, past family history, past medical history, past social history, past surgical history and problem list.    Patient Active Problem List   Diagnosis   • Abnormal mammogram   • Anemia   • Angular cheilitis   • Breast mass, left   • Cerebral infarction due to unspecified occlusion or stenosis of unspecified carotid artery (CMS/HCC)   • Spinal stenosis of cervical region   • Degenerative joint disease of right acromioclavicular joint   • Cellulitis of right upper extremity   • Diaphragmatic hernia   • Edema   • Extrapyramidal and movement disorders in diseases classified elsewhere   • Fusion of lumbar spine   • Gastritis with hemorrhage   • Gastroesophageal reflux disease   • H/O total hysterectomy with bilateral salpingo-oophorectomy (BSO)   • History of Nissen fundoplication   • Hyperlipidemia   • Hypokalemia   • Malaise and fatigue   • Irritable bowel syndrome without diarrhea   • Laryngeal cancer (CMS/HCC)   • Malignant neoplasm of thyroid gland (CMS/HCC)   • Menopausal syndrome   • Multiple pathological fractures   • Neuralgia   • Neuropathy   • Occlusion of left carotid artery   • Osteoarthritis   • Other cervical disc degeneration, unspecified cervical region   • Iron deficiency anemia   • Other postprocedural states   • Other specified persistent mood disorders (CMS/HCC)   • Multiple joint pain   • Tremor    • Unsteady gait   • Pain of left upper extremity   • Flat back syndrome   • Lumbar stenosis with neurogenic claudication   • Vascular disease   • Acute GI bleeding   • Abnormal CXR       Current Outpatient Medications on File Prior to Visit   Medication Sig Dispense Refill   • Calcium Carbonate-Vitamin D (CALCIUM 500 + D) 500-125 MG-UNIT tablet Take 1 tablet by mouth Daily.     • cyanocobalamin (VITAMIN B-12) 500 MCG tablet Take 2 tablets by mouth Daily.     • DULoxetine (CYMBALTA) 60 MG capsule Take 1 capsule by mouth 2 (Two) Times a Day.     • ferrous sulfate 325 (65 FE) MG tablet Take 1 tablet by mouth 2 (Two) Times a Day.     • gabapentin (NEURONTIN) 800 MG tablet TAKE ONE TABLET BY MOUTH FOUR TIMES A  tablet 12   • hydrochlorothiazide (HYDRODIURIL) 50 MG tablet TAKE ONE TABLET BY MOUTH ONCE DAILY 30 tablet 5   • linaclotide (LINZESS) 290 MCG capsule capsule Take 1 capsule by mouth Daily.     • methadone (DOLOPHINE) 10 MG tablet Take 1 tablet by mouth Every 8 (Eight) Hours As Needed for Moderate Pain  or Severe Pain , 90 tablet 0   • nystatin-triamcinolone (MYCOLOG II) 166341-1.1 UNIT/GM-% cream      • oxyCODONE-acetaminophen (PERCOCET)  MG per tablet Take 1 tablet by mouth 4 (Four) Times a Day As Needed for Moderate Pain . 120 tablet 0   • pantoprazole (PROTONIX) 40 MG EC tablet Take 1 tablet by mouth 2 (Two) Times a Day for 30 days. 60 tablet 0   • potassium chloride (KLOR-CON) 20 MEQ CR tablet Take 1 tablet by mouth 2 (Two) Times a Day.     • PROAIR  (90 Base) MCG/ACT inhaler INHALE 2 PUFFS EVERY 6 HOURS AS NEEDED FOR SHORTNESS OF AIR 8.5 g 5   • raloxifene (EVISTA) 60 MG tablet TAKE ONE TABLET BY MOUTH ONCE DAILY 30 tablet 5   • rOPINIRole (REQUIP) 4 MG tablet Take 1 tablet by mouth 3 (Three) Times a Day As Needed.     • rosuvastatin (CRESTOR) 10 MG tablet Take 1 tablet by mouth Daily.     • sertraline (ZOLOFT) 100 MG tablet Take 2 tablets by mouth every night at bedtime. 60 tablet 12    • sucralfate (CARAFATE) 1 g tablet Take 1 tablet by mouth 4 (Four) Times a Day Before Meals & at Bedtime for 30 days. 120 tablet 0   • aspirin 325 MG tablet Take 1 tablet by mouth Daily.     • clopidogrel (PLAVIX) 75 MG tablet TAKE ONE TABLET BY MOUTH ONCE DAILY 30 tablet 5   • ELIQUIS 5 MG tablet tablet TAKE ONE TABLET BY MOUTH TWO TIMES A DAY 60 tablet 5     No current facility-administered medications on file prior to visit.        Allergies   Allergen Reactions   • Meperidine Other (See Comments)       Review of Systems   Constitutional: Positive for fatigue. Negative for activity change, appetite change and fever.   HENT: Negative for ear pain, swollen glands and voice change.    Eyes: Negative for visual disturbance.   Respiratory: Negative for shortness of breath and wheezing.    Cardiovascular: Negative for chest pain and leg swelling.   Gastrointestinal: Negative for abdominal pain, blood in stool, constipation, diarrhea, nausea and vomiting.   Endocrine: Negative for polydipsia and polyuria.   Genitourinary: Negative for dysuria, frequency and hematuria.   Musculoskeletal: Positive for arthralgias, back pain, neck pain and neck stiffness. Negative for joint swelling.   Skin: Negative for rash and bruise.   Neurological: Negative for weakness, numbness and headache.   Psychiatric/Behavioral: Negative for suicidal ideas and depressed mood.       Objective   Physical Exam   Constitutional: She is oriented to person, place, and time. She appears well-developed and well-nourished.   HENT:   Head: Normocephalic and atraumatic.   Left Ear: External ear normal.   Nose: Nose normal.   Mouth/Throat: Oropharynx is clear and moist.   Eyes: EOM are normal. Pupils are equal, round, and reactive to light.   Neck: Normal range of motion. Neck supple.   Cardiovascular: Normal rate, regular rhythm and normal heart sounds.   Pulmonary/Chest: Effort normal.   Abdominal: Soft. Bowel sounds are normal.   Neurological: She is  alert and oriented to person, place, and time.   Skin: Skin is warm and dry.   Psychiatric: Her behavior is normal. Judgment and thought content normal.         Assessment/Plan .  Problem List Items Addressed This Visit     Abnormal CXR - Primary    Overview     Will get Ct chest with contrast         Relevant Orders    CT Chest With Contrast      Findings discussed. All questions answered.  Differential diagnosis discussed.   Follow-up after testing complete, sooner for worsening symptoms or any concerns

## 2019-10-01 NOTE — PROGRESS NOTES
Hematology/Oncology Outpatient Consultation    Patient name: Madalyn Navas  : 1955  MRN: [id number]  Primary Care Physician: Hollis Adrian MD  Referring Physician: No ref. provider found  Reason For Consult:     Chief Complaint   Patient presents with   • Consult     Lung mass       History of Present Illness:  This is a 64-year-old female who developed chest pain, shortness of breath and coughing spells.  She had  white sputum production.  She denies hemoptysis.  Patient has lost approximately 42 pounds over the past 3 months.  For the above symptoms, she had a CT scan of the chest done on 10/1/2019 these basically revealed a large mass centered in the left mediastinal at the AP window measuring 9.4 cm and encasing the pulmonary artery and the left bronchus.  There was pathologically enlarged left supraclavicular lymph nodes.  There may be evidence of postobstructive pneumonitis.  There was a mass in the liver that measures 2.3 cm on the right lobe suspicious for metastatic disease.  There was probable right adrenal adenoma.  There was also a 4.5 mm nodule in the right middle lobe of unclear etiology.  She has a history of laryngeal cancer for which she underwent radiation treatment in .  Patient also had a history of kidney cancer she status post right nephrectomy in .    She has been referred for the new mediastinal mass  Past Medical History:   Diagnosis Date   • Abnormal mammogram    • Acute bronchitis    • Acute non-infective otitis externa of right ear     Unspecified.    • Anemia, unspecified     Impression: recheck   • Angular cheilitis    • Bloody stool     Impression: recommend GI referral.   • Breast mass, left     Impression: abcess on ultrasound. Needs follow up diagnostic mammo   • Cancer of kidney (CMS/HCC)    • Cellulitis of right upper extremity     Impression: at previous IV site. start atbx, heat.   • Cerebral ischemia     Other specified transient cerebral ischemias   •  Cerebrovascular accident, old     Impression: followed by neurology. Has appt in Feb   • Cervical cancer screening     Impression: s/p hysterectomy   • Cervical disc disorder with myelopathy     Impression: currently undergoing accupuncture which works well.   • Closed fracture of bone     Foot.    • Cough     Impression: likely reactive   • Cramps, muscle, general     Impression: check labs   • Degeneration of cervical intervertebral disc     Impression: try increasing requip due to RLS   • Degenerative disc disease, lumbar    • Diaphragmatic hernia    • Edema     Impression: dependent. BP noted. Baseline labs ok. on hctz, try increase to 50 mg qd   • Extrapyramidal and movement disorders in diseases classified elsewhere    • Fever presenting with conditions classified elsewhere    • Folliculitis     Impression: right chin. Try bactroban Follow-up in 2 weeks for reevaluation, sooner for concerns.   • Fracture of right hip, closed, with nonunion, subsequent encounter     Impression: June 2015 Dr Carrera. Will get records   • Fracture, intertrochanteric, right femur (CMS/HCC)    • Fusion of lumbar spine     Story: and 12/2013   • Gastritis with hemorrhage     Unspecified chronicity, unspecified gastritis type. Impression: GI bleed noted on camera evaluation, followed by GI   • GERD (gastroesophageal reflux disease)    • H/O total hysterectomy with bilateral salpingo-oophorectomy (BSO)    • Hip pain, left     Impression: will re-xray   • History of Nissen fundoplication    • History of tobacco use    • Hx of cholecystectomy     Open.   • Hypokalemia     Impression: continue potassium, recheck in 2 weeks   • Initial Medicare annual wellness visit    • Internal derangement of shoulder, right     Impression: MRI showed labral tears, surger scheduled 11/7/17 by Dr David   • Iron deficiency anemia, unspecified     Impression: recommended venofer. Pt would like to discuss with specialist. GI AVMs cause chronic blood loss.  Followed by GI   • Irritable bowel syndrome without diarrhea     Impression: constipation predominant   • Knee pain    • Laryngeal cancer (CMS/[facility])    • Leg pain     Impression: 10 day history   • Lumbar disc disorder with myelopathy     Impression: failed fentanyl, go back to methadone. improved with epidurals Pt would like referral to Dr Abdi for opinion.   • Malaise and fatigue    • Malignant neoplasm of thyroid gland (CMS/[facility])    • Medicare annual wellness visit, subsequent     With abnormal findings   • Menopausal syndrome    • Mixed hyperlipidemia     Story: Stable, Compliant with meds because Is getting adequate diet and exercise Goals developed at last visit were met because Follow up in months Care management needs are self addressed. Would benefit from care management Self-Management abilities addressed and patient is capable of managing his/her own disease   • Multiple pathological fractures     Sequela. Impression: dexa, calcium, mag normal. Recc ortho eval   • Neuralgia    • Neuropathy     Impression: try foltx   • Occlusion of left carotid artery     Impression: nonsurgicsl. Seen by vascular   • Orthostatic hypotension    • Osteoporosis     Impression: recheck dexa   • Other specified abdominal hernia without obstruction or gangrene     Impression: most likely. Findings discussed. All questions answered. Reassurance, education. Consider CT if changes. Pt not interested in surgical repair   • Other specified persistent mood disorders (CMS/[facility])     Story: anxiety with panic attack. Impression: worsening. Try zoloft. Follow-up in one month, sooner for concerns. Watch for serotonin sx given concurrent cymbalta   • Overweight (BMI 25.0-29.9)    • Pain of both hip joints     Impression: refer to Dr Marcano   • Pelvic fracture (CMS/[facility])    • Polyarthralgia     Story: fibromyalgia   • Positive depression screening    • S/P lumbar spine operation    • Screening for alcoholism    • Screening for osteoporosis    •  Seroma, post-traumatic (CMS/HCC)     Impression: likely at site of nephrectomy. Findings discussed. All questions answered. Empiric antibiotics. Follow-up for routine health maintenance as directed. Keep appt with urologist   • Sinusitis    • Tobacco use    • Tremor     Story: unclear if intermittent tremor central from petit mal or focal seizure vs local nerve irritation. Findings discussed. All questions answered. Differential diagnosis discussed Impression: left sided. Try increasing requip to 4 mg tid. Continue gabapentin. May need neurology eval for treatment options. Will get EEgprior to eval for petit mal    • Unsteady gait    • UTI (urinary tract infection)    • Visit for screening mammogram        Past Surgical History:   Procedure Laterality Date   • COLONOSCOPY N/A 9/10/2019    Procedure: COLONOSCOPY;  Surgeon: Nain Rosales MD;  Location: [facility] ENDOSCOPY;  Service: Gastroenterology   • ENDOSCOPY N/A 9/10/2019    Procedure: ESOPHAGOGASTRODUODENOSCOPY with biopsy X1;  Surgeon: Nain Rosales MD;  Location: [facility] ENDOSCOPY;  Service: Gastroenterology   • HIP SURGERY Right     ORIF right hip   • NEPHRECTOMY  07/09/2013    Right, [facility].   • SHOULDER SURGERY Right 11/07/2017    arthroplasty   • TOTAL ABDOMINAL HYSTERECTOMY WITH SALPINGO OOPHORECTOMY           Current Outpatient Medications:   •  aspirin 325 MG tablet, Take 1 tablet by mouth Daily., Disp: , Rfl:   •  Calcium Carbonate-Vitamin D (CALCIUM 500 + D) 500-125 MG-UNIT tablet, Take 1 tablet by mouth Daily., Disp: , Rfl:   •  clopidogrel (PLAVIX) 75 MG tablet, TAKE ONE TABLET BY MOUTH ONCE DAILY, Disp: 30 tablet, Rfl: 5  •  cyanocobalamin (VITAMIN B-12) 500 MCG tablet, Take 2 tablets by mouth Daily., Disp: , Rfl:   •  DULoxetine (CYMBALTA) 60 MG capsule, Take 1 capsule by mouth 2 (Two) Times a Day., Disp: , Rfl:   •  ELIQUIS 5 MG tablet tablet, TAKE ONE TABLET BY MOUTH TWO TIMES A DAY, Disp: 60 tablet, Rfl: 5  •  ferrous sulfate  325 (65 FE) MG tablet, Take 1 tablet by mouth 2 (Two) Times a Day., Disp: , Rfl:   •  gabapentin (NEURONTIN) 800 MG tablet, TAKE ONE TABLET BY MOUTH FOUR TIMES A DAY, Disp: 120 tablet, Rfl: 12  •  hydrochlorothiazide (HYDRODIURIL) 50 MG tablet, TAKE ONE TABLET BY MOUTH ONCE DAILY, Disp: 30 tablet, Rfl: 5  •  linaclotide (LINZESS) 290 MCG capsule capsule, Take 1 capsule by mouth Daily., Disp: , Rfl:   •  methadone (DOLOPHINE) 10 MG tablet, Take 1 tablet by mouth Every 8 (Eight) Hours As Needed for Moderate Pain  or Severe Pain ,, Disp: 90 tablet, Rfl: 0  •  nystatin-triamcinolone (MYCOLOG II) 050594-6.1 UNIT/GM-% cream, , Disp: , Rfl:   •  oxyCODONE-acetaminophen (PERCOCET)  MG per tablet, Take 1 tablet by mouth 4 (Four) Times a Day As Needed for Moderate Pain ., Disp: 120 tablet, Rfl: 0  •  pantoprazole (PROTONIX) 40 MG EC tablet, Take 1 tablet by mouth 2 (Two) Times a Day for 30 days., Disp: 60 tablet, Rfl: 0  •  potassium chloride (KLOR-CON) 20 MEQ CR tablet, Take 1 tablet by mouth 2 (Two) Times a Day., Disp: , Rfl:   •  PROAIR  (90 Base) MCG/ACT inhaler, INHALE 2 PUFFS EVERY 6 HOURS AS NEEDED FOR SHORTNESS OF AIR, Disp: 8.5 g, Rfl: 5  •  raloxifene (EVISTA) 60 MG tablet, TAKE ONE TABLET BY MOUTH ONCE DAILY, Disp: 30 tablet, Rfl: 5  •  rOPINIRole (REQUIP) 4 MG tablet, Take 1 tablet by mouth 3 (Three) Times a Day As Needed., Disp: , Rfl:   •  rosuvastatin (CRESTOR) 10 MG tablet, Take 1 tablet by mouth Daily., Disp: , Rfl:   •  sertraline (ZOLOFT) 100 MG tablet, Take 2 tablets by mouth every night at bedtime., Disp: 60 tablet, Rfl: 12  •  sucralfate (CARAFATE) 1 g tablet, Take 1 tablet by mouth 4 (Four) Times a Day Before Meals & at Bedtime for 30 days., Disp: 120 tablet, Rfl: 0  No current facility-administered medications for this visit.     Allergies   Allergen Reactions   • Meperidine Other (See Comments)       Immunization History   Administered Date(s) Administered   • Flu Vaccine Intradermal Quad  "18-64YR 10/02/2017   • Flu Vaccine Split Quad 10/02/2017   • Tdap 03/10/2015       Family History   Problem Relation Age of Onset   • Cancer Other         Lung   • Diabetes Other    • Lung cancer Mother 75        Smoker   • Throat cancer Father 76        Smoker   • Lung cancer Sister 62        Smoker       Cancer-related family history includes Cancer in an other family member; Lung cancer (age of onset: 62) in her sister; Lung cancer (age of onset: 75) in her mother; Throat cancer (age of onset: 76) in her father.    Social History     Tobacco Use   • Smoking status: Current Every Day Smoker     Packs/day: 1.00     Types: Cigarettes   • Smokeless tobacco: Never Used   Substance Use Topics   • Alcohol use: No     Frequency: Never   • Drug use: No       ROS:    Review of Systems   Constitutional: Positive for unexpected weight change (22lbs in 2 months). Negative for chills and fever.   HENT: Negative for ear pain, mouth sores, nosebleeds and sore throat.    Eyes: Negative for photophobia and visual disturbance.   Respiratory: Positive for cough (white sputum) and shortness of breath (not consistently). Negative for wheezing and stridor.    Cardiovascular: Negative for chest pain and palpitations.   Gastrointestinal: Negative for abdominal pain, diarrhea, nausea and vomiting.   Endocrine: Negative for cold intolerance and heat intolerance.   Genitourinary: Negative for dysuria and hematuria.   Musculoskeletal: Negative for joint swelling and neck stiffness.   Skin: Negative for color change and rash.   Neurological: Negative for seizures and syncope.   Hematological: Negative for adenopathy.        No obvious bleeding   Psychiatric/Behavioral: Negative for agitation, confusion and hallucinations.       Objective:    Vitals:    10/02/19 0914   BP: 138/89   Pulse: 92   Resp: 20   Temp: 98 °F (36.7 °C)   TempSrc: Oral   Weight: 74.8 kg (165 lb)   Height: 160 cm (63\")   PainSc:   6   PainLoc: Back       ECOG  (1) " Restricted in physically strenuous activity, ambulatory and able to do work of light nature    Physical Exam:    Physical Exam   Constitutional: She is oriented to person, place, and time. No distress.   HENT:   Head: Normocephalic and atraumatic.   Eyes: Conjunctivae and EOM are normal. Right eye exhibits no discharge. Left eye exhibits no discharge. No scleral icterus.   Neck: Normal range of motion. Neck supple. No thyromegaly present.   Cardiovascular: Normal rate, regular rhythm and normal heart sounds. Exam reveals no gallop and no friction rub.   Pulmonary/Chest: Effort normal. No stridor. No respiratory distress. She has wheezes.   Decreased breath sounds   Abdominal: Soft. Bowel sounds are normal. She exhibits no mass. There is no tenderness. There is no rebound and no guarding.   Musculoskeletal: Normal range of motion. She exhibits no tenderness.   Lymphadenopathy:     She has no cervical adenopathy.   Neurological: She is alert and oriented to person, place, and time. She exhibits normal muscle tone.   Skin: Skin is warm. No rash noted. She is not diaphoretic. No erythema.   Psychiatric: She has a normal mood and affect. Her behavior is normal.   Nursing note and vitals reviewed.      RECENT LABS  WBC   Date Value Ref Range Status   09/16/2019 13.7 (H) 3.4 - 10.8 x10E3/uL Final     RBC   Date Value Ref Range Status   09/16/2019 3.06 (L) 3.77 - 5.28 x10E6/uL Final     Hemoglobin   Date Value Ref Range Status   09/16/2019 7.1 (L) 11.1 - 15.9 g/dL Final   09/10/2019 7.7 (L) 12.0 - 15.9 g/dL Final     Hematocrit   Date Value Ref Range Status   09/16/2019 23.2 (L) 34.0 - 46.6 % Final   09/10/2019 25.1 (L) 34.0 - 46.6 % Final     MCV   Date Value Ref Range Status   09/16/2019 76 (L) 79 - 97 fL Final   09/10/2019 78.8 (L) 79.0 - 97.0 fL Final     MCH   Date Value Ref Range Status   09/16/2019 23.2 (L) 26.6 - 33.0 pg Final   09/10/2019 23.6 (L) 26.6 - 33.0 pg Final     MCHC   Date Value Ref Range Status    09/16/2019 30.6 (L) 31.5 - 35.7 g/dL Final   09/10/2019 30.0 (L) 31.5 - 35.7 g/dL Final     RDW   Date Value Ref Range Status   09/16/2019 20.5 (H) 12.3 - 15.4 % Final   09/10/2019 22.6 (H) 12.3 - 15.4 % Final     RDW-SD   Date Value Ref Range Status   09/10/2019 63.9 (H) 37.0 - 54.0 fl Final     MPV   Date Value Ref Range Status   09/10/2019 7.9 6.0 - 12.0 fL Final     Platelets   Date Value Ref Range Status   09/16/2019 413 150 - 450 x10E3/uL Final   09/10/2019 347 140 - 450 10*3/mm3 Final     Neutrophil Rel %   Date Value Ref Range Status   09/16/2019 74 Not Estab. % Final     Neutrophil %   Date Value Ref Range Status   09/10/2019 73.7 42.7 - 76.0 % Final     Lymphocyte Rel %   Date Value Ref Range Status   09/16/2019 20 Not Estab. % Final     Lymphocyte %   Date Value Ref Range Status   09/10/2019 16.2 (L) 19.6 - 45.3 % Final     Monocyte Rel %   Date Value Ref Range Status   09/16/2019 5 Not Estab. % Final     Monocyte %   Date Value Ref Range Status   09/10/2019 6.8 5.0 - 12.0 % Final     Eosinophil Rel %   Date Value Ref Range Status   09/16/2019 1 Not Estab. % Final     Eosinophil %   Date Value Ref Range Status   09/10/2019 2.7 0.3 - 6.2 % Final     Basophil Rel %   Date Value Ref Range Status   09/16/2019 0 Not Estab. % Final     Basophil %   Date Value Ref Range Status   09/10/2019 0.6 0.0 - 1.5 % Final     Neutrophils Absolute   Date Value Ref Range Status   09/16/2019 10.2 (H) 1.4 - 7.0 x10E3/uL Final     Neutrophils, Absolute   Date Value Ref Range Status   09/10/2019 7.10 (H) 1.70 - 7.00 10*3/mm3 Final     Lymphocytes Absolute   Date Value Ref Range Status   09/16/2019 2.7 0.7 - 3.1 x10E3/uL Final     Lymphocytes, Absolute   Date Value Ref Range Status   09/10/2019 1.60 0.70 - 3.10 10*3/mm3 Final     Monocytes Absolute   Date Value Ref Range Status   09/16/2019 0.7 0.1 - 0.9 x10E3/uL Final     Monocytes, Absolute   Date Value Ref Range Status   09/10/2019 0.70 0.10 - 0.90 10*3/mm3 Final      Eosinophils Absolute   Date Value Ref Range Status   09/16/2019 0.2 0.0 - 0.4 x10E3/uL Final     Eosinophils, Absolute   Date Value Ref Range Status   09/10/2019 0.30 0.00 - 0.40 10*3/mm3 Final     Basophils Absolute   Date Value Ref Range Status   09/16/2019 0.0 0.0 - 0.2 x10E3/uL Final     Basophils, Absolute   Date Value Ref Range Status   09/10/2019 0.10 0.00 - 0.20 10*3/mm3 Final     nRBC   Date Value Ref Range Status   09/10/2019 0.1 0.0 - 0.2 /100 WBC Final       Lab Results   Component Value Date    GLUCOSE 94 09/10/2019    BUN 16 09/16/2019    CREATININE 1.22 (H) 09/16/2019    EGFRIFNONA 48 (L) 09/16/2019    EGFRIFAFRI 55 (L) 09/16/2019    BCR 13 09/16/2019    K 3.7 09/16/2019    CO2 23 09/16/2019    CALCIUM 9.5 09/16/2019    PROTENTOTREF 6.7 08/13/2019    ALBUMIN 3.10 (L) 09/10/2019    LABIL2 1.1 (L) 08/13/2019    AST 15 09/10/2019    ALT 11 (L) 09/10/2019         Assessment/Plan     There are no diagnoses linked to this encounter.    1. Large anterior mediastinal mass suspect bronchogenic carcinoma or other  2. History of peptic ulcer ulcer disease with recurrent GI bleed  3. History of anemia  4. History of laryngeal cancer status post radiation therapy 1999   5. History of right kidney cancer status post right nephrectomy in 2014    Discussion:    She has a large mass in the mediastinum suspect primary bronchogenic malignancy.  Patient needs tissue biopsy to confirm.  She has an appointment to see Dr. Lewis on 10/3/2019.  Patient will be interested in chemotherapy once this is confirmed therefore Dr. Lewis would also place a port.  Will complete her staging with a PET CT scan.  I have also ordered a CBC, CMP and CEA.  I will see her in the office in 1 week to review the results and to make further recommendations    Thank you very much allowing me participate in the care of Ms. Navas.  I will keep you updated on on her progress.      I have reviewed labs results, imaging, vitals, and  medications with the patient today.  Will follow up in 1 week  with me.    I counselled Madalyn of the risks of continuing to use tobacco and cessation.    During this visit, I spent 3-10 minutes counseling the patient regarding tobacco cessation.     Patient verbalized understanding and is in agreement of the above plan.        Part of this document was scribed by Wendi Grewal RN, BSN.        Much of the above report is an electronic transcription/translation of the spoken language to printed text using Dragon Software. As such, the subtleties and finesse of the spoken language may permit erroneous, or at times, nonsensical words or phrases to be inadvertently transcribed; thus changes may be made at a later date to rectify these errors.

## 2019-10-03 PROBLEM — R91.8 LUNG MASS: Status: ACTIVE | Noted: 2019-01-01

## 2019-10-03 NOTE — OUTREACH NOTE
Care Plan Note      Responses   Lifestyle Goals  Routine follow-up with doctor(s), Medication management, Other (See Comment) [decrease anxiety]   Barriers  -- [anxiety]   Self Management  Stress Management Techniques, Medication Adherence   Suggested Appointments  -- [make appt with GI]   AWV Materials  Send Materials   Care Gaps Addressed  Colon Cancer Screening, Mammogram   Colon Cancer Screening Type  Colonoscopy   Colonoscopy Status  Up to Date (< 10 yrs)   Mammogram Status  Up to Date   Other Patient Education/Resources   24/7 [facility] Nurse Call Line, Advanced Care Planning   24/7 Nurse Call Line Education Method  Verbal   ACP Education Method  Send Materials   Does patient have depression diagnosis?  No   Advanced Directives:  Send Materials   Ed Visits past 12 months:  None   Hospitalizations past 12 months  2 or 3   Discharged From:  [facility]   Discharged to:  home   Admit Date:  09/09/19   Discharge Date:  09/10/19   Discharge destination:  Home   Medication Adherence  Medications understood   Goal Progress  Making Progress Toward Goal(s)   Readiness Scale  9   Confidence Scale  9   Health Literacy  Good        Pt discharged from [facility] on 9/10/19, admitted with anemia, GI bleed. Pt has seen cardiologist and PCP post discharge. Pt saw medical oncologist yesterday and has appointment with thoracic surgeon today. RN-CC outreach call made to pt. Pt reports she has not followed up with GI since hospital discharge, states she has not called their office to schedule. Pt states she also was discharged from [facility] on 9/27/19 due to GI bleed. Pt reports MD Flores is her GI MD. Since discharge pt was found to have lung mass and has been seen at [facility]. Pt states she is supposed to have lung biopsy and port placement within the next couple days. RN-CC offered to call pts GI MD and notify them she needs follow up appointment and inform them of her recent lung findings, pt agreed and  "asked that RN-CC also notify them she was in [facility] on 9/27/19. Pt denies any abdominal pain, blood in stool, or fatigue. Pt states she is having normal BM's. Pt reports she has not had her hemoglobin checked since she was in the hospital. Labs were ordered per medical oncologist at last visit. RN-CC offered to call CC and inquire about when pt should get labs drawn, pt asked RN-CC to call. Pt states she feels that her hemoglobin is good right now, states \"I can tell it's good by the way I feel\". Pt reports her only complaint at this time is anxiety. Pt states she takes prescription medications for anxiety but they don't help much. Education provided on relaxation exercises to try when she feels anxiety coming on. No other questions or concerns per pt. [facility] 24 hour nurse line number given. Will mail materials on AWV and AD to discuss at later date. Advised pt to call RN-CC with any needs. RN-CC to call GI MD and medical oncologist office. Next outreach scheduled.     Venkat Monahan RN  Community Care Coordinator    10/3/2019, 11:35 AM    "

## 2019-10-03 NOTE — OUTREACH NOTE
Care Coordination Assessment    Documented/Reviewed By:  Venkat Monahan RN Date/time:  10/3/2019 11:19 AM   Assessment completed with:  patient  Enrolled in care management program:  Yes  Living arrangement:  children (Comment: daughter)  Support system:  children  Type of residence:  private residence  Home care services:  No  Equipment used at home:  cane  Communication device:  Yes  Bed or wheelchair confined:  No  Medication adherence problem:  No  History of fall(s) in last 6 months:  Yes  Difficulty keeping appointments:  No  Family aware of the patient's advance care planning wishes:  No

## 2019-10-03 NOTE — OUTREACH NOTE
Care Coordination Note    Called [facility], left message with nurse line asking about pts lab orders, when labs are due. Requested CC staff call pt back to notify her.    Venkat Monahan RN  Community Care Coordinator    10/3/2019, 11:36 AM

## 2019-10-03 NOTE — H&P (VIEW-ONLY)
Thoracic surgery consult  Reason for consult: Left hilar lung mass  Requesting physician: Dr. rivas [facility] onc[facility]    Chief complaint mass detected on chest radiography associated with GI bleed    Subjective   I have reviewed the hospital record and reviewed the accompanying physician notes.  I have discussed the case with the following physicians: Dr. Rivas we discussed her large left mediastinal mass likely primary lung cancer.    H  istory of Present Illness  This patient presented with persistent cough 40 pound weight loss and evidence of GI blood loss.  She underwent chest radiography which identified a large left hilar mass with extension into the mediastinum and ascending the mediastinum.  There is also associated left supraclavicular lymphadenopathy noted.  Patient had not noticed a neck mass.  She had a prior history of laryngeal cancer.  Her voice is hoarse but she states that this is not new.  Her energy level is been somewhat decreased.  She is not having headaches.  She has chronic low back pain and right hip pain.  The right hip pain is somewhat worse recently.  She has a PET scan pending.  I personally examined the patient CT scan and reviewed the radiology report and I concur with it.    Review of Systems  All systems have been reviewed with the patient.  The results are scanned into the chart.  Pertinent positives include weight loss anxiety arthritic pain in her neck legs and back bowel changes including diarrhea and bloating.  She notes dry mouth chronic hoarseness and easy bruising.  She has been on anticoagulation but this is been held.  She has a prior history of a stroke for which she takes Eliquis and aspirin.  All other systems are negative.    Past Medical History:   Diagnosis Date   • Abnormal mammogram    • Acute bronchitis    • Acute non-infective otitis externa of right ear     Unspecified.    • Anemia, unspecified     Impression: recheck   • Angular cheilitis    • Bloody stool      Impression: recommend GI referral.   • Breast mass, left     Impression: abcess on ultrasound. Needs follow up diagnostic mammo   • Cancer of kidney (CMS/HCC)    • Cellulitis of right upper extremity     Impression: at previous IV site. start atbx, heat.   • Cerebral ischemia     Other specified transient cerebral ischemias   • Cerebrovascular accident, old     Impression: followed by neurology. Has appt in Feb   • Cervical cancer screening     Impression: s/p hysterectomy   • Cervical disc disorder with myelopathy     Impression: currently undergoing accupuncture which works well.   • Closed fracture of bone     Foot.    • Cough     Impression: likely reactive   • Cramps, muscle, general     Impression: check labs   • Degeneration of cervical intervertebral disc     Impression: try increasing requip due to RLS   • Degenerative disc disease, lumbar    • Diaphragmatic hernia    • Edema     Impression: dependent. BP noted. Baseline labs ok. on hctz, try increase to 50 mg qd   • Extrapyramidal and movement disorders in diseases classified elsewhere    • Fever presenting with conditions classified elsewhere    • Folliculitis     Impression: right chin. Try bactroban Follow-up in 2 weeks for reevaluation, sooner for concerns.   • Fracture of right hip, closed, with nonunion, subsequent encounter     Impression: June 2015 Dr Carrera. Will get records   • Fracture, intertrochanteric, right femur (CMS/HCC)    • Fusion of lumbar spine     Story: and 12/2013   • Gastritis with hemorrhage     Unspecified chronicity, unspecified gastritis type. Impression: GI bleed noted on camera evaluation, followed by GI   • GERD (gastroesophageal reflux disease)    • H/O total hysterectomy with bilateral salpingo-oophorectomy (BSO)    • Hip pain, left     Impression: will re-xray   • History of Nissen fundoplication    • History of tobacco use    • Hx of cholecystectomy     Open.   • Hypokalemia     Impression: continue potassium, recheck in  2 weeks   • Initial Medicare annual wellness visit    • Internal derangement of shoulder, right     Impression: MRI showed labral tears, surger scheduled 11/7/17 by Dr David   • Iron deficiency anemia, unspecified     Impression: recommended venofer. Pt would like to discuss with specialist. GI AVMs cause chronic blood loss. Followed by GI   • Irritable bowel syndrome without diarrhea     Impression: constipation predominant   • Knee pain    • Laryngeal cancer (CMS/[facility])    • Leg pain     Impression: 10 day history   • Lumbar disc disorder with myelopathy     Impression: failed fentanyl, go back to methadone. improved with epidurals Pt would like referral to Dr Abdi for opinion.   • Malaise and fatigue    • Malignant neoplasm of thyroid gland (CMS/[facility])    • Medicare annual wellness visit, subsequent     With abnormal findings   • Menopausal syndrome    • Mixed hyperlipidemia     Story: Stable, Compliant with meds because Is getting adequate diet and exercise Goals developed at last visit were met because Follow up in months Care management needs are self addressed. Would benefit from care management Self-Management abilities addressed and patient is capable of managing his/her own disease   • Multiple pathological fractures     Sequela. Impression: dexa, calcium, mag normal. Recc ortho eval   • Neuralgia    • Neuropathy     Impression: try foltx   • Occlusion of left carotid artery     Impression: nonsurgicsl. Seen by vascular   • Orthostatic hypotension    • Osteoporosis     Impression: recheck dexa   • Other specified abdominal hernia without obstruction or gangrene     Impression: most likely. Findings discussed. All questions answered. Reassurance, education. Consider CT if changes. Pt not interested in surgical repair   • Other specified persistent mood disorders (CMS/HCC)     Story: anxiety with panic attack. Impression: worsening. Try zoloft. Follow-up in one month, sooner for concerns. Watch for serotonin sx  given concurrent cymbalta   • Overweight (BMI 25.0-29.9)    • Pain of both hip joints     Impression: refer to Dr Marcano   • Pelvic fracture (CMS/HCC)    • Polyarthralgia     Story: fibromyalgia   • Positive depression screening    • S/P lumbar spine operation    • Screening for alcoholism    • Screening for osteoporosis    • Seroma, post-traumatic (CMS/HCC)     Impression: likely at site of nephrectomy. Findings discussed. All questions answered. Empiric antibiotics. Follow-up for routine health maintenance as directed. Keep appt with urologist   • Sinusitis    • Tobacco use    • Tremor     Story: unclear if intermittent tremor central from petit mal or focal seizure vs local nerve irritation. Findings discussed. All questions answered. Differential diagnosis discussed Impression: left sided. Try increasing requip to 4 mg tid. Continue gabapentin. May need neurology eval for treatment options. Will get EEgprior to eval for petit mal    • Unsteady gait    • UTI (urinary tract infection)    • Visit for screening mammogram       Social History     Socioeconomic History   • Marital status:      Spouse name: Not on file   • Number of children: Not on file   • Years of education: Not on file   • Highest education level: Not on file   Tobacco Use   • Smoking status: Former Smoker     Packs/day: 1.00     Years: 44.00     Pack years: 44.00     Types: Cigarettes     Last attempt to quit: 9/30/2019   • Smokeless tobacco: Never Used   Substance and Sexual Activity   • Alcohol use: No     Frequency: Never   • Drug use: No   • Sexual activity: Defer    family history includes Cancer in an other family member; Diabetes in an other family member; Lung cancer (age of onset: 62) in her sister; Lung cancer (age of onset: 75) in her mother; Throat cancer (age of onset: 76) in her father.   Past Surgical History:   Procedure Laterality Date   • COLONOSCOPY N/A 9/10/2019    Procedure: COLONOSCOPY;  Surgeon: Nain Rosales MD;   Location: [facility] ENDOSCOPY;  Service: Gastroenterology   • ENDOSCOPY N/A 9/10/2019    Procedure: ESOPHAGOGASTRODUODENOSCOPY with biopsy X1;  Surgeon: Nain Rosales MD;  Location: [facility] ENDOSCOPY;  Service: Gastroenterology   • HIP SURGERY Right     ORIF right hip   • NEPHRECTOMY  07/09/2013    Right, [facility].   • SHOULDER SURGERY Right 11/07/2017    arthroplasty   • TOTAL ABDOMINAL HYSTERECTOMY WITH SALPINGO OOPHORECTOMY         Objective      Vital Signs  Temp:  [97.4 °F (36.3 °C)] 97.4 °F (36.3 °C)  Heart Rate:  [105] 105  BP: (141)/(90) 141/90    [unfilled]    Physical Exam   Constitutional: She is oriented to person, place, and time. She appears well-developed and well-nourished. No distress.   Patient sitting up comfortably conversant.  Voice is very hoarse.  High-pitched.   HENT:   Head: Normocephalic and atraumatic.   Right Ear: External ear normal.   Left Ear: External ear normal.   Nose: Nose normal.   Mouth/Throat: Oropharynx is clear and moist. No oropharyngeal exudate.   Eyes: Conjunctivae and EOM are normal. Left eye exhibits no discharge.   Neck: Normal range of motion. Neck supple. No JVD present. No tracheal deviation present. No thyromegaly present.   Firm left supraclavicular lymphadenopathy in the scalene lymph node region medially.  Amenable to biopsy.   Cardiovascular: Normal rate and regular rhythm. Exam reveals no friction rub.   No murmur heard.  Pulmonary/Chest: Effort normal.   Wheezing and rhonchi noted on the left.  Clear on the right.  Symmetrical chest wall expansion.  No rib tenderness no spine tenderness.   Abdominal: Soft. Bowel sounds are normal. She exhibits no distension. There is no tenderness. There is no guarding.   Musculoskeletal: Normal range of motion. She exhibits no edema, tenderness or deformity.   Neurological: She is alert and oriented to person, place, and time. No cranial nerve deficit. Coordination normal.   Skin: Skin is warm and dry. Capillary  refill takes less than 2 seconds. No rash noted. She is not diaphoretic. No erythema. No pallor.   Psychiatric: She has a normal mood and affect. Her behavior is normal. Judgment and thought content normal.   Vitals reviewed.       Results Review:  I have personally reviewed the following radiographs and reviewed the radiology reports.  My independent finds are see above    Lab Results:  Lab Results (last 24 hours)     ** No results found for the last 24 hours. **           Meds    Current Outpatient Medications:   •  Calcium Carbonate-Vitamin D (CALCIUM 500 + D) 500-125 MG-UNIT tablet, Take 1 tablet by mouth Daily., Disp: , Rfl:   •  cyanocobalamin (VITAMIN B-12) 500 MCG tablet, Take 2 tablets by mouth Daily., Disp: , Rfl:   •  DULoxetine (CYMBALTA) 60 MG capsule, Take 1 capsule by mouth 2 (Two) Times a Day., Disp: , Rfl:   •  ferrous sulfate 325 (65 FE) MG tablet, Take 1 tablet by mouth 2 (Two) Times a Day., Disp: , Rfl:   •  gabapentin (NEURONTIN) 800 MG tablet, TAKE ONE TABLET BY MOUTH FOUR TIMES A DAY, Disp: 120 tablet, Rfl: 12  •  hydrochlorothiazide (HYDRODIURIL) 50 MG tablet, TAKE ONE TABLET BY MOUTH ONCE DAILY, Disp: 30 tablet, Rfl: 5  •  linaclotide (LINZESS) 290 MCG capsule capsule, Take 1 capsule by mouth Daily., Disp: , Rfl:   •  methadone (DOLOPHINE) 10 MG tablet, Take 1 tablet by mouth Every 8 (Eight) Hours As Needed for Moderate Pain  or Severe Pain ,, Disp: 90 tablet, Rfl: 0  •  nystatin-triamcinolone (MYCOLOG II) 126183-4.1 UNIT/GM-% cream, , Disp: , Rfl:   •  oxyCODONE-acetaminophen (PERCOCET)  MG per tablet, Take 1 tablet by mouth 4 (Four) Times a Day As Needed for Moderate Pain ., Disp: 120 tablet, Rfl: 0  •  pantoprazole (PROTONIX) 40 MG EC tablet, Take 1 tablet by mouth 2 (Two) Times a Day for 30 days., Disp: 60 tablet, Rfl: 0  •  potassium chloride (KLOR-CON) 20 MEQ CR tablet, Take 1 tablet by mouth 2 (Two) Times a Day., Disp: , Rfl:   •  PROAIR  (90 Base) MCG/ACT inhaler, INHALE  2 PUFFS EVERY 6 HOURS AS NEEDED FOR SHORTNESS OF AIR, Disp: 8.5 g, Rfl: 5  •  rOPINIRole (REQUIP) 4 MG tablet, Take 1 tablet by mouth 3 (Three) Times a Day As Needed., Disp: , Rfl:   •  rosuvastatin (CRESTOR) 10 MG tablet, Take 1 tablet by mouth Daily., Disp: , Rfl:   •  sertraline (ZOLOFT) 100 MG tablet, Take 2 tablets by mouth every night at bedtime., Disp: 60 tablet, Rfl: 12  •  sucralfate (CARAFATE) 1 g tablet, Take 1 tablet by mouth 4 (Four) Times a Day Before Meals & at Bedtime for 30 days., Disp: 120 tablet, Rfl: 0  •  aspirin 325 MG tablet, Take 1 tablet by mouth Daily., Disp: , Rfl:   •  clopidogrel (PLAVIX) 75 MG tablet, TAKE ONE TABLET BY MOUTH ONCE DAILY, Disp: 30 tablet, Rfl: 5  •  ELIQUIS 5 MG tablet tablet, TAKE ONE TABLET BY MOUTH TWO TIMES A DAY, Disp: 60 tablet, Rfl: 5  •  raloxifene (EVISTA) 60 MG tablet, TAKE ONE TABLET BY MOUTH ONCE DAILY, Disp: 30 tablet, Rfl: 5    Note is made of the fact that the patient takes both Plavix and Eliquis.  However she has stopped taking this    Assessment/Plan     #1 likely lung cancer in the left hilum and left supraclavicular region  2.  Anticoagulation now stopped #3 antiplatelet therapy now stopped #4 history of stroke now at her baseline state #5 history of GI bleed we will check laboratory preoperatively.  Plan left scalene lymph node biopsy Medpor placement for presumed diagnosis of lung cancer.  Patient appears to have locally advanced lung cancer.  Initiation of therapy as soon as possible as indicated.  I reviewed all of the patient's recent films.  I discussed her case with medical oncology and we concur with the plan.    Dougie Lewis MD  [facility]  10/03/19  1:52 PM

## 2019-10-03 NOTE — PROGRESS NOTES
Thoracic surgery consult  Reason for consult: Left hilar lung mass  Requesting physician: Dr. rivas medical oncology    Chief complaint mass detected on chest radiography associated with GI bleed    Subjective   I have reviewed the hospital record and reviewed the accompanying physician notes.  I have discussed the case with the following physicians: Dr. Rivas we discussed her large left mediastinal mass likely primary lung cancer.    H  istory of Present Illness  This patient presented with persistent cough 40 pound weight loss and evidence of GI blood loss.  She underwent chest radiography which identified a large left hilar mass with extension into the mediastinum and ascending the mediastinum.  There is also associated left supraclavicular lymphadenopathy noted.  Patient had not noticed a neck mass.  She had a prior history of laryngeal cancer.  Her voice is hoarse but she states that this is not new.  Her energy level is been somewhat decreased.  She is not having headaches.  She has chronic low back pain and right hip pain.  The right hip pain is somewhat worse recently.  She has a PET scan pending.  I personally examined the patient CT scan and reviewed the radiology report and I concur with it.    Review of Systems  All systems have been reviewed with the patient.  The results are scanned into the chart.  Pertinent positives include weight loss anxiety arthritic pain in her neck legs and back bowel changes including diarrhea and bloating.  She notes dry mouth chronic hoarseness and easy bruising.  She has been on anticoagulation but this is been held.  She has a prior history of a stroke for which she takes Eliquis and aspirin.  All other systems are negative.    Past Medical History:   Diagnosis Date   • Abnormal mammogram    • Acute bronchitis    • Acute non-infective otitis externa of right ear     Unspecified.    • Anemia, unspecified     Impression: recheck   • Angular cheilitis    • Bloody stool      Impression: recommend GI referral.   • Breast mass, left     Impression: abcess on ultrasound. Needs follow up diagnostic mammo   • Cancer of kidney (CMS/HCC)    • Cellulitis of right upper extremity     Impression: at previous IV site. start atbx, heat.   • Cerebral ischemia     Other specified transient cerebral ischemias   • Cerebrovascular accident, old     Impression: followed by neurology. Has appt in Feb   • Cervical cancer screening     Impression: s/p hysterectomy   • Cervical disc disorder with myelopathy     Impression: currently undergoing accupuncture which works well.   • Closed fracture of bone     Foot.    • Cough     Impression: likely reactive   • Cramps, muscle, general     Impression: check labs   • Degeneration of cervical intervertebral disc     Impression: try increasing requip due to RLS   • Degenerative disc disease, lumbar    • Diaphragmatic hernia    • Edema     Impression: dependent. BP noted. Baseline labs ok. on hctz, try increase to 50 mg qd   • Extrapyramidal and movement disorders in diseases classified elsewhere    • Fever presenting with conditions classified elsewhere    • Folliculitis     Impression: right chin. Try bactroban Follow-up in 2 weeks for reevaluation, sooner for concerns.   • Fracture of right hip, closed, with nonunion, subsequent encounter     Impression: June 2015 Dr Carrera. Will get records   • Fracture, intertrochanteric, right femur (CMS/HCC)    • Fusion of lumbar spine     Story: and 12/2013   • Gastritis with hemorrhage     Unspecified chronicity, unspecified gastritis type. Impression: GI bleed noted on camera evaluation, followed by GI   • GERD (gastroesophageal reflux disease)    • H/O total hysterectomy with bilateral salpingo-oophorectomy (BSO)    • Hip pain, left     Impression: will re-xray   • History of Nissen fundoplication    • History of tobacco use    • Hx of cholecystectomy     Open.   • Hypokalemia     Impression: continue potassium, recheck in  2 weeks   • Initial Medicare annual wellness visit    • Internal derangement of shoulder, right     Impression: MRI showed labral tears, surger scheduled 11/7/17 by Dr David   • Iron deficiency anemia, unspecified     Impression: recommended venofer. Pt would like to discuss with specialist. GI AVMs cause chronic blood loss. Followed by GI   • Irritable bowel syndrome without diarrhea     Impression: constipation predominant   • Knee pain    • Laryngeal cancer (CMS/[facility])    • Leg pain     Impression: 10 day history   • Lumbar disc disorder with myelopathy     Impression: failed fentanyl, go back to methadone. improved with epidurals Pt would like referral to Dr Abdi for opinion.   • Malaise and fatigue    • Malignant neoplasm of thyroid gland (CMS/[facility])    • Medicare annual wellness visit, subsequent     With abnormal findings   • Menopausal syndrome    • Mixed hyperlipidemia     Story: Stable, Compliant with meds because Is getting adequate diet and exercise Goals developed at last visit were met because Follow up in months Care management needs are self addressed. Would benefit from care management Self-Management abilities addressed and patient is capable of managing his/her own disease   • Multiple pathological fractures     Sequela. Impression: dexa, calcium, mag normal. Recc ortho eval   • Neuralgia    • Neuropathy     Impression: try foltx   • Occlusion of left carotid artery     Impression: nonsurgicsl. Seen by vascular   • Orthostatic hypotension    • Osteoporosis     Impression: recheck dexa   • Other specified abdominal hernia without obstruction or gangrene     Impression: most likely. Findings discussed. All questions answered. Reassurance, education. Consider CT if changes. Pt not interested in surgical repair   • Other specified persistent mood disorders (CMS/HCC)     Story: anxiety with panic attack. Impression: worsening. Try zoloft. Follow-up in one month, sooner for concerns. Watch for serotonin sx  given concurrent cymbalta   • Overweight (BMI 25.0-29.9)    • Pain of both hip joints     Impression: refer to Dr aMrcano   • Pelvic fracture (CMS/HCC)    • Polyarthralgia     Story: fibromyalgia   • Positive depression screening    • S/P lumbar spine operation    • Screening for alcoholism    • Screening for osteoporosis    • Seroma, post-traumatic (CMS/HCC)     Impression: likely at site of nephrectomy. Findings discussed. All questions answered. Empiric antibiotics. Follow-up for routine health maintenance as directed. Keep appt with urologist   • Sinusitis    • Tobacco use    • Tremor     Story: unclear if intermittent tremor central from petit mal or focal seizure vs local nerve irritation. Findings discussed. All questions answered. Differential diagnosis discussed Impression: left sided. Try increasing requip to 4 mg tid. Continue gabapentin. May need neurology eval for treatment options. Will get EEgprior to eval for petit mal    • Unsteady gait    • UTI (urinary tract infection)    • Visit for screening mammogram       Social History     Socioeconomic History   • Marital status:      Spouse name: Not on file   • Number of children: Not on file   • Years of education: Not on file   • Highest education level: Not on file   Tobacco Use   • Smoking status: Former Smoker     Packs/day: 1.00     Years: 44.00     Pack years: 44.00     Types: Cigarettes     Last attempt to quit: 9/30/2019   • Smokeless tobacco: Never Used   Substance and Sexual Activity   • Alcohol use: No     Frequency: Never   • Drug use: No   • Sexual activity: Defer    family history includes Cancer in an other family member; Diabetes in an other family member; Lung cancer (age of onset: 62) in her sister; Lung cancer (age of onset: 75) in her mother; Throat cancer (age of onset: 76) in her father.   Past Surgical History:   Procedure Laterality Date   • COLONOSCOPY N/A 9/10/2019    Procedure: COLONOSCOPY;  Surgeon: Nain Rosales MD;   Location: [facility] ENDOSCOPY;  Service: Gastroenterology   • ENDOSCOPY N/A 9/10/2019    Procedure: ESOPHAGOGASTRODUODENOSCOPY with biopsy X1;  Surgeon: Nain Rosales MD;  Location: [facility] ENDOSCOPY;  Service: Gastroenterology   • HIP SURGERY Right     ORIF right hip   • NEPHRECTOMY  07/09/2013    Right, [facility].   • SHOULDER SURGERY Right 11/07/2017    arthroplasty   • TOTAL ABDOMINAL HYSTERECTOMY WITH SALPINGO OOPHORECTOMY         Objective      Vital Signs  Temp:  [97.4 °F (36.3 °C)] 97.4 °F (36.3 °C)  Heart Rate:  [105] 105  BP: (141)/(90) 141/90    [unfilled]    Physical Exam   Constitutional: She is oriented to person, place, and time. She appears well-developed and well-nourished. No distress.   Patient sitting up comfortably conversant.  Voice is very hoarse.  High-pitched.   HENT:   Head: Normocephalic and atraumatic.   Right Ear: External ear normal.   Left Ear: External ear normal.   Nose: Nose normal.   Mouth/Throat: Oropharynx is clear and moist. No oropharyngeal exudate.   Eyes: Conjunctivae and EOM are normal. Left eye exhibits no discharge.   Neck: Normal range of motion. Neck supple. No JVD present. No tracheal deviation present. No thyromegaly present.   Firm left supraclavicular lymphadenopathy in the scalene lymph node region medially.  Amenable to biopsy.   Cardiovascular: Normal rate and regular rhythm. Exam reveals no friction rub.   No murmur heard.  Pulmonary/Chest: Effort normal.   Wheezing and rhonchi noted on the left.  Clear on the right.  Symmetrical chest wall expansion.  No rib tenderness no spine tenderness.   Abdominal: Soft. Bowel sounds are normal. She exhibits no distension. There is no tenderness. There is no guarding.   Musculoskeletal: Normal range of motion. She exhibits no edema, tenderness or deformity.   Neurological: She is alert and oriented to person, place, and time. No cranial nerve deficit. Coordination normal.   Skin: Skin is warm and dry. Capillary  refill takes less than 2 seconds. No rash noted. She is not diaphoretic. No erythema. No pallor.   Psychiatric: She has a normal mood and affect. Her behavior is normal. Judgment and thought content normal.   Vitals reviewed.       Results Review:  I have personally reviewed the following radiographs and reviewed the radiology reports.  My independent finds are see above    Lab Results:  Lab Results (last 24 hours)     ** No results found for the last 24 hours. **           Meds    Current Outpatient Medications:   •  Calcium Carbonate-Vitamin D (CALCIUM 500 + D) 500-125 MG-UNIT tablet, Take 1 tablet by mouth Daily., Disp: , Rfl:   •  cyanocobalamin (VITAMIN B-12) 500 MCG tablet, Take 2 tablets by mouth Daily., Disp: , Rfl:   •  DULoxetine (CYMBALTA) 60 MG capsule, Take 1 capsule by mouth 2 (Two) Times a Day., Disp: , Rfl:   •  ferrous sulfate 325 (65 FE) MG tablet, Take 1 tablet by mouth 2 (Two) Times a Day., Disp: , Rfl:   •  gabapentin (NEURONTIN) 800 MG tablet, TAKE ONE TABLET BY MOUTH FOUR TIMES A DAY, Disp: 120 tablet, Rfl: 12  •  hydrochlorothiazide (HYDRODIURIL) 50 MG tablet, TAKE ONE TABLET BY MOUTH ONCE DAILY, Disp: 30 tablet, Rfl: 5  •  linaclotide (LINZESS) 290 MCG capsule capsule, Take 1 capsule by mouth Daily., Disp: , Rfl:   •  methadone (DOLOPHINE) 10 MG tablet, Take 1 tablet by mouth Every 8 (Eight) Hours As Needed for Moderate Pain  or Severe Pain ,, Disp: 90 tablet, Rfl: 0  •  nystatin-triamcinolone (MYCOLOG II) 003092-6.1 UNIT/GM-% cream, , Disp: , Rfl:   •  oxyCODONE-acetaminophen (PERCOCET)  MG per tablet, Take 1 tablet by mouth 4 (Four) Times a Day As Needed for Moderate Pain ., Disp: 120 tablet, Rfl: 0  •  pantoprazole (PROTONIX) 40 MG EC tablet, Take 1 tablet by mouth 2 (Two) Times a Day for 30 days., Disp: 60 tablet, Rfl: 0  •  potassium chloride (KLOR-CON) 20 MEQ CR tablet, Take 1 tablet by mouth 2 (Two) Times a Day., Disp: , Rfl:   •  PROAIR  (90 Base) MCG/ACT inhaler, INHALE  2 PUFFS EVERY 6 HOURS AS NEEDED FOR SHORTNESS OF AIR, Disp: 8.5 g, Rfl: 5  •  rOPINIRole (REQUIP) 4 MG tablet, Take 1 tablet by mouth 3 (Three) Times a Day As Needed., Disp: , Rfl:   •  rosuvastatin (CRESTOR) 10 MG tablet, Take 1 tablet by mouth Daily., Disp: , Rfl:   •  sertraline (ZOLOFT) 100 MG tablet, Take 2 tablets by mouth every night at bedtime., Disp: 60 tablet, Rfl: 12  •  sucralfate (CARAFATE) 1 g tablet, Take 1 tablet by mouth 4 (Four) Times a Day Before Meals & at Bedtime for 30 days., Disp: 120 tablet, Rfl: 0  •  aspirin 325 MG tablet, Take 1 tablet by mouth Daily., Disp: , Rfl:   •  clopidogrel (PLAVIX) 75 MG tablet, TAKE ONE TABLET BY MOUTH ONCE DAILY, Disp: 30 tablet, Rfl: 5  •  ELIQUIS 5 MG tablet tablet, TAKE ONE TABLET BY MOUTH TWO TIMES A DAY, Disp: 60 tablet, Rfl: 5  •  raloxifene (EVISTA) 60 MG tablet, TAKE ONE TABLET BY MOUTH ONCE DAILY, Disp: 30 tablet, Rfl: 5    Note is made of the fact that the patient takes both Plavix and Eliquis.  However she has stopped taking this    Assessment/Plan     #1 likely lung cancer in the left hilum and left supraclavicular region  2.  Anticoagulation now stopped #3 antiplatelet therapy now stopped #4 history of stroke now at her baseline state #5 history of GI bleed we will check laboratory preoperatively.  Plan left scalene lymph node biopsy Medpor placement for presumed diagnosis of lung cancer.  Patient appears to have locally advanced lung cancer.  Initiation of therapy as soon as possible as indicated.  I reviewed all of the patient's recent films.  I discussed her case with medical oncology and we concur with the plan.    Dougie Lewis MD  [facility]  10/03/19  1:52 PM

## 2019-10-04 NOTE — OP NOTE
INSERTION VENOUS ACCESS DEVICE, CERVICAL LYMPH NODE BIOPSY/EXCISION, BRONCHOSCOPY  Procedure Report    Patient Name:  Madalyn Navas  YOB: 1955    Date of Surgery:  10/4/2019     Indications: Large left hilar lung mass with scalene lymph node enlargement    Pre-op Diagnosis:   Lung mass [R91.8]       Post-Op Diagnosis Codes:     * Lung mass [R91.8] malignancy left upper lobe with metastatic disease to scalene lymph node    Procedure/CPT® Codes:      Procedure(s):  Insertion of venous access device fluoroscopy  CERVICAL LYMPH NODE BIOPSY/EXCISION-scalene lymph node biopsy  BRONCHOSCOPY    Staff:  Surgeon(s):  Dougie Lewis MD    Assistant: Luz Montanez RN    Anesthesia: General    Estimated Blood Loss: minimal    Implants:    Implant Name Type Inv. Item Serial No.  Lot No. LRB No. Used   POWERPORT MRI CATH/POLY INTERMED 1L KATHY/H 8F CLR - [id number] Implant POWERPORT MRI CATH/POLY INTERMED 1L KATHY/H 8F CLR  [employer] [id number] Right 1       Specimen:          Specimens     ID Source Type Tests Collected By Collected At Frozen?      A Lymph Node Tissue · TISSUE PATHOLOGY EXAM   Dougie Lewis MD 10/4/19 0808 Yes     Comment: SCALENE LYMPH NODE    B Lymph Node Tissue · TISSUE PATHOLOGY EXAM   Dougie Lewis MD 10/4/19 0811      Comment: SCALENE LYMPH NODE              Findings: Malignancy replacing scalene lymph node.  Endobronchial obstruction of anterior segment of left upper lobe bronchus and partial obstruction of lingular bronchus by tumor good port position and function  Complications: None    Description of Procedure: The patient was intubated with a single-lumen endotracheal tube.  In order timeout was taken all were in agreement we then proceeded.  Fiberoptic bronchoscopy was performed as a diagnostic procedure to assess the extent of endobronchial disease.  No endobronchial lesions were seen on the right side the mucosa was normal throughout on the right  side.  On the left side the left upper lobe anterior segmental bronchus was occluded by submucosal tumor similarly the lingular bronchus was partially occluded by submucosal tumor.  The apical posterior branch of the left upper lobe was patent.  The lower lobe bronchi were unremarkable.  I chose to not biopsy the airway to avoid bleeding.  Patient has a history of anticoagulant use.    Left scalene lymph node biopsy was performed through a small incision at the base of the left neck.  The platysma was divided the sternocleidomastoid muscle was split in the direction of its fibers the underlying strap muscle was retracted and the scalene lymph node was exposed.  Gentle dissection provided very good exposure of the lymph node.  A large biopsy was taken of the node taking care to not go deep and biopsy the area of the phrenic nerve.  Specimens were sent for frozen section and for permanent section.  Pathology returned poorly differentiated malignancy.  Hemostasis was obtained with local pressure and Surgicel.  Minimal use of cautery was used superficially but not near the phrenic nerve.  The wound was irrigated out with water.  The wound was closed in layers with absorbable suture.    Next a right subclavian med port placement was placed.  The right subclavian vein was cannulated in routine fashion a dilator and sheath were advanced over the guidewire.  The dilator and the guidewire were removed with the catheter was introduced the sheath sheath was peeled away and the catheter was tunneled to the Mediport pocket.  Under fluoroscopic guidance the catheter was adjusted so the tip resided in the superior vena cava.  The catheter aspirated and flushed nicely with heparinized saline.  The catheter was connected to the med port. The med port fit snugly in the med port pocket.  The wound was closed in layers around with absorbable suture.  Final fluoroscopy confirmed good placement of the catheter no pneumothorax and no  kinking of the catheter.    Sterile dressings were applied patient tolerated the procedure well.      Dougie Lewis MD     Date: 10/4/2019  Time: 8:39 AM

## 2019-10-04 NOTE — ANESTHESIA PROCEDURE NOTES
Airway  Urgency: elective    Date/Time: 10/4/2019 7:42 AM  Airway not difficult    General Information and Staff    Patient location during procedure: OR    Indications and Patient Condition  Indications for airway management: airway protection    Preoxygenated: yes  MILS maintained throughout  Mask difficulty assessment: 0 - not attempted    Final Airway Details  Final airway type: endotracheal airway      Successful airway: ETT  Cuffed: yes   Successful intubation technique: direct laryngoscopy  Endotracheal tube insertion site: oral  Blade: Kevin  Blade size: 3  ETT size (mm): 8.0  Cormack-Lehane Classification: grade I - full view of glottis  Placement verified by: chest auscultation and capnometry   Cuff volume (mL): 6  Measured from: lips  ETT/EBT  to lips (cm): 20  Number of attempts at approach: 1  Assessment: lips, teeth, and gum same as pre-op and atraumatic intubation

## 2019-10-04 NOTE — DISCHARGE INSTRUCTIONS
Elevate head of bed 30 degrees  Hold Eliquis for 2 days to ensure there is no bleeding from the wounds.  Then you may resume Eliquis as previously ordered.  Follow-up with thoracic surgery next week follow-up with medical oncology as soon as possible

## 2019-10-04 NOTE — ANESTHESIA PREPROCEDURE EVALUATION
Anesthesia Evaluation     Patient summary reviewed and Nursing notes reviewed   NPO Solid Status: > 8 hours  NPO Liquid Status: > 8 hours           Airway   Mallampati: I  TM distance: >3 FB  Neck ROM: full  No difficulty expected  Dental - normal exam   (+) poor dentition    Pulmonary    (+) lung cancer, shortness of breath, decreased breath sounds,   Cardiovascular - normal exam    ECG reviewed  Rhythm: regular    (+) PVD, hyperlipidemia,  carotid artery disease      Neuro/Psych  (+) tremors,     GI/Hepatic/Renal/Endo    (+)  GERD, GI bleeding, renal disease,     Musculoskeletal (-) negative ROS    Abdominal  - normal exam    Bowel sounds: normal.   Substance History - negative use     OB/GYN          Other        ROS/Med Hx Other: Hoarseness X 4 years  Cough X 4 months                    Anesthesia Plan    ASA 3     general     intravenous induction   Anesthetic plan, all risks, benefits, and alternatives have been provided, discussed and informed consent has been obtained with: patient.  Use of blood products discussed with patient .    Anesthesia Evaluation     NPO Solid Status: > 8 hours  NPO Liquid Status: > 8 hours           Airway   Mallampati: I  TM distance: >3 FB  Neck ROM: full  No difficulty expected  Dental - normal exam     Pulmonary - normal exam   Cardiovascular - normal exam    (+) PVD, hyperlipidemia,  carotid artery disease      Neuro/Psych  GI/Hepatic/Renal/Endo    (+)  GERD, GI bleeding,     Musculoskeletal     Abdominal  - normal exam    Bowel sounds: normal.   Substance History      OB/GYN          Other                      Anesthesia Plan    ASA 3     MAC     intravenous induction   Anesthetic plan, all risks, benefits, and alternatives have been provided, discussed and informed consent has been obtained with: patient.

## 2019-10-04 NOTE — ANESTHESIA POSTPROCEDURE EVALUATION
Patient: Madalyn Navas    Procedure Summary     Date:  10/04/19 Room / Location:  [facility] OR 08 / [facility] MA[facility]    Anesthesia Start:  0735 Anesthesia Stop:  0847    Procedures:       INSERTION OF  RIGHT SUBCLAVIAN MEDIPORT  WITH FLUOROSCOPIC GUIDENCE (Right )      SCALENE LYMPH NODE BIOPSY (Left Neck)      DIAGNOSTIC BRONCHOSCOPY (N/A Bronchus) Diagnosis:       Lung mass      (Lung mass [R91.8])    Surgeon:  Dougie Lewis MD Provider:  Yvon Herrera MD    Anesthesia Type:  general ASA Status:  3          Anesthesia Type: general  Last vitals  BP   147/79 (10/04/19 0942)   Temp   97.4 °F (36.3 °C) (10/04/19 0942)   Pulse   78 (10/04/19 0942)   Resp   12 (10/04/19 0929)     SpO2   94 % (10/04/19 0942)     Post Anesthesia Care and Evaluation    Patient location during evaluation: PACU  Patient participation: complete - patient participated  Level of consciousness: awake  Pain scale: See nurse's notes for pain score.  Pain management: adequate  Airway patency: patent  Anesthetic complications: No anesthetic complications  PONV Status: none  Cardiovascular status: acceptable  Respiratory status: acceptable  Hydration status: acceptable    Comments: Patient seen and examined postoperatively; vital signs stable; SpO2 greater than or equal to 90%; cardiopulmonary status stable; nausea/vomiting adequately controlled; pain adequately controlled; no apparent anesthesia complications; patient discharged from anesthesia care when discharge criteria were met

## 2019-10-07 NOTE — TELEPHONE ENCOUNTER
Madalyn called and said she will have to cancel surgery because she just found out she has Cancer in her lung and had a nodule removed from her neck. She said it doesn't look good. I told her to hang in there and she does not know if she can have any treatment at this time. I will remove her from the schedule.

## 2019-10-09 NOTE — PROGRESS NOTES
Hematology/Oncology Outpatient Follow Up    PATIENT NAME:Madalyn Navas  :1955  MRN: [id number]  PRIMARY CARE PHYSICIAN: Hollis Adrian MD  REFERRING PHYSICIAN: Hollis Adrian,*    Chief Complaint   Patient presents with   • Follow-up     Large cell neuroendocrine lung cancer        HISTORY OF PRESENT ILLNESS:   This is a 64-year-old female who developed chest pain, shortness of breath and coughing spells.  She had  white sputum production.  She denies hemoptysis.  Patient has lost approximately 42 pounds over the past 3 months.  For the above symptoms, she had a CT scan of the chest done on 10/1/2019 these basically revealed a large mass centered in the left mediastinal at the AP window measuring 9.4 cm and encasing the pulmonary artery and the left bronchus.  There was pathologically enlarged left supraclavicular lymph nodes.  There may be evidence of postobstructive pneumonitis.  There was a mass in the liver that measures 2.3 cm on the right lobe suspicious for metastatic disease.  There was probable right adrenal adenoma.  There was also a 4.5 mm nodule in the right middle lobe of unclear etiology.  She has a history of laryngeal cancer for which she underwent radiation treatment in .  Patient also had a history of kidney cancer she status post right nephrectomy in .     She has been referred for the new mediastinal mass  · 10/4/2019: She underwent left scalene node biopsy and Mediport placement by Dr. Lewis.  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  · 10/8/2019-patient had a PET CT scan which showed a 3.7 cm left supraclavicular lymph node with an SUV of 8.1.  There is a 9.4 cm left anterior mediastinal mass with uptake with SUV of 12.3.  There may be some associated mediastinal and left hilar lymphadenopathy.  There is focal uptake within the right hepatic lobe with an SUV of 7.3.  There is also uptake in the right adrenal gland with an SUV of 0.8.   There is an uptake on T9 transverse process with SUV of 4 likely representing metastasis    Past Medical History:   Diagnosis Date   • Abnormal mammogram    • Acute bronchitis    • Acute non-infective otitis externa of right ear     Unspecified.    • Anemia, unspecified     Impression: recheck   • Angular cheilitis    • Bloody stool     Impression: recommend GI referral.   • Breast mass, left     Impression: abcess on ultrasound. Needs follow up diagnostic mammo   • Cancer of kidney (CMS/HCC)    • Cellulitis of right upper extremity     Impression: at previous IV site. start atbx, heat.   • Cerebral ischemia     Other specified transient cerebral ischemias   • Cerebrovascular accident, old     Impression: followed by neurology. Has appt in Feb   • Cervical cancer screening     Impression: s/p hysterectomy   • Cervical disc disorder with myelopathy     Impression: currently undergoing accupuncture which works well.   • Closed fracture of bone     Foot.    • Cough     Impression: likely reactive   • Cramps, muscle, general     Impression: check labs   • Degeneration of cervical intervertebral disc     Impression: try increasing requip due to RLS   • Degenerative disc disease, lumbar    • Diaphragmatic hernia    • Edema     Impression: dependent. BP noted. Baseline labs ok. on hctz, try increase to 50 mg qd   • Extrapyramidal and movement disorders in diseases classified elsewhere    • Fever presenting with conditions classified elsewhere    • Folliculitis     Impression: right chin. Try bactroban Follow-up in 2 weeks for reevaluation, sooner for concerns.   • Fracture of right hip, closed, with nonunion, subsequent encounter     Impression: June 2015 Dr Carrera. Will get records   • Fracture, intertrochanteric, right femur (CMS/HCC)    • Fusion of lumbar spine     Story: and 12/2013   • Gastritis with hemorrhage     Unspecified chronicity, unspecified gastritis type. Impression: GI bleed noted on camera evaluation,  followed by GI   • GERD (gastroesophageal reflux disease)    • H/O total hysterectomy with bilateral salpingo-oophorectomy (BSO)    • Hip pain, left     Impression: will re-xray   • History of Nissen fundoplication    • History of tobacco use    • Hx of cholecystectomy     Open.   • Hypokalemia     Impression: continue potassium, recheck in 2 weeks   • Initial Medicare annual wellness visit    • Internal derangement of shoulder, right     Impression: MRI showed labral tears, surger scheduled 11/7/17 by Dr David   • Iron deficiency anemia, unspecified     Impression: recommended venofer. Pt would like to discuss with specialist. GI AVMs cause chronic blood loss. Followed by GI   • Irritable bowel syndrome without diarrhea     Impression: constipation predominant   • Knee pain    • Laryngeal cancer (CMS/HCC)    • Leg pain     Impression: 10 day history   • Lumbar disc disorder with myelopathy     Impression: failed fentanyl, go back to methadone. improved with epidurals Pt would like referral to Dr Abdi for opinion.   • Malaise and fatigue    • Malignant neoplasm of thyroid gland (CMS/HCC)    • Medicare annual wellness visit, subsequent     With abnormal findings   • Menopausal syndrome    • Mixed hyperlipidemia     Story: Stable, Compliant with meds because Is getting adequate diet and exercise Goals developed at last visit were met because Follow up in months Care management needs are self addressed. Would benefit from care management Self-Management abilities addressed and patient is capable of managing his/her own disease   • Multiple pathological fractures     Sequela. Impression: dexa, calcium, mag normal. Recc ortho eval   • Neuralgia    • Neuropathy     Impression: try foltx   • Occlusion of left carotid artery     Impression: nonsurgicsl. Seen by vascular   • Orthostatic hypotension    • Osteoporosis     Impression: recheck dexa   • Other specified abdominal hernia without obstruction or gangrene      Impression: most likely. Findings discussed. All questions answered. Reassurance, education. Consider CT if changes. Pt not interested in surgical repair   • Other specified persistent mood disorders (CMS/HCC)     Story: anxiety with panic attack. Impression: worsening. Try zoloft. Follow-up in one month, sooner for concerns. Watch for serotonin sx given concurrent cymbalta   • Overweight (BMI 25.0-29.9)    • Pain of both hip joints     Impression: refer to Dr Marcano   • Pelvic fracture (CMS/HCC)    • Polyarthralgia     Story: fibromyalgia   • Positive depression screening    • S/P lumbar spine operation    • Screening for alcoholism    • Screening for osteoporosis    • Seroma, post-traumatic (CMS/HCC)     Impression: likely at site of nephrectomy. Findings discussed. All questions answered. Empiric antibiotics. Follow-up for routine health maintenance as directed. Keep appt with urologist   • Sinusitis    • Tobacco use    • Tremor     Story: unclear if intermittent tremor central from petit mal or focal seizure vs local nerve irritation. Findings discussed. All questions answered. Differential diagnosis discussed Impression: left sided. Try increasing requip to 4 mg tid. Continue gabapentin. May need neurology eval for treatment options. Will get EEgprior to eval for petit mal    • Unsteady gait    • UTI (urinary tract infection)    • Visit for screening mammogram        Past Surgical History:   Procedure Laterality Date   • APPENDECTOMY     • BACK SURGERY      lumber   • BRONCHOSCOPY N/A 10/4/2019    Procedure: DIAGNOSTIC BRONCHOSCOPY;  Surgeon: Dougie Lewis MD;  Location: [facility] MAIN OR;  Service: Cardiothoracic   • CERVICAL LYMPH NODE BIOPSY/EXCISION Left 10/4/2019    Procedure: SCALENE LYMPH NODE BIOPSY;  Surgeon: Dougie Lewis MD;  Location: [facility] MAIN OR;  Service: Cardiothoracic   • COLONOSCOPY N/A 9/10/2019    Procedure: COLONOSCOPY;  Surgeon: Nain Rosales MD;  Location: [facility] ENDOSCOPY;   Service: Gastroenterology   • ENDOSCOPY N/A 9/10/2019    Procedure: ESOPHAGOGASTRODUODENOSCOPY with biopsy X1;  Surgeon: Nain Rosales MD;  Location: [facility] ENDOSCOPY;  Service: Gastroenterology   • FRACTURE SURGERY Right     hip   • HERNIA REPAIR      chest   • HIP SURGERY Right     ORIF right hip   • NEPHRECTOMY  07/09/2013    Right, [facility].   • SHOULDER SURGERY Right 11/07/2017    arthroplasty   • TOTAL ABDOMINAL HYSTERECTOMY WITH SALPINGO OOPHORECTOMY     • VENOUS ACCESS DEVICE (PORT) INSERTION Right 10/4/2019    Procedure: INSERTION OF  RIGHT SUBCLAVIAN MEDIPORT  WITH FLUOROSCOPIC GUIDENCE;  Surgeon: Dougie Lewis MD;  Location: [facility] MAIN OR;  Service: Cardiothoracic         Current Outpatient Medications:   •  Calcium Carbonate-Vitamin D (CALCIUM 500 + D) 500-125 MG-UNIT tablet, Take 1 tablet by mouth Daily., Disp: , Rfl:   •  DULoxetine (CYMBALTA) 60 MG capsule, Take 1 capsule by mouth 2 (Two) Times a Day., Disp: , Rfl:   •  gabapentin (NEURONTIN) 800 MG tablet, TAKE ONE TABLET BY MOUTH FOUR TIMES A DAY, Disp: 120 tablet, Rfl: 12  •  hydrochlorothiazide (HYDRODIURIL) 50 MG tablet, TAKE ONE TABLET BY MOUTH ONCE DAILY, Disp: 30 tablet, Rfl: 5  •  linaclotide (LINZESS) 290 MCG capsule capsule, Take 1 capsule by mouth Daily., Disp: , Rfl:   •  methadone (DOLOPHINE) 10 MG tablet, Take 1 tablet by mouth Every 8 (Eight) Hours As Needed for Moderate Pain  or Severe Pain ,, Disp: 90 tablet, Rfl: 0  •  nystatin-triamcinolone (MYCOLOG II) 762485-9.1 UNIT/GM-% cream, , Disp: , Rfl:   •  oxyCODONE-acetaminophen (PERCOCET)  MG per tablet, Take 1 tablet by mouth 4 (Four) Times a Day As Needed for Moderate Pain ., Disp: 120 tablet, Rfl: 0  •  pantoprazole (PROTONIX) 40 MG EC tablet, Take 1 tablet by mouth 2 (Two) Times a Day for 30 days., Disp: 60 tablet, Rfl: 0  •  potassium chloride (KLOR-CON) 20 MEQ CR tablet, Take 1 tablet by mouth 2 (Two) Times a Day., Disp: , Rfl:   •  PROAIR   (90 Base) MCG/ACT inhaler, INHALE 2 PUFFS EVERY 6 HOURS AS NEEDED FOR SHORTNESS OF AIR, Disp: 8.5 g, Rfl: 5  •  rosuvastatin (CRESTOR) 10 MG tablet, Take 1 tablet by mouth Daily., Disp: , Rfl:   •  sertraline (ZOLOFT) 100 MG tablet, Take 2 tablets by mouth every night at bedtime., Disp: 60 tablet, Rfl: 12  •  sucralfate (CARAFATE) 1 g tablet, Take 1 tablet by mouth 4 (Four) Times a Day Before Meals & at Bedtime for 30 days., Disp: 120 tablet, Rfl: 0  •  cyanocobalamin (VITAMIN B-12) 500 MCG tablet, Take 2 tablets by mouth Daily., Disp: , Rfl:     Allergies   Allergen Reactions   • Meperidine Swelling     demerol       Family History   Problem Relation Age of Onset   • Cancer Other         Lung   • Diabetes Other    • Lung cancer Mother 75        Smoker   • Throat cancer Father 76        Smoker   • Lung cancer Sister 62        Smoker       Cancer-related family history includes Cancer in an other family member; Lung cancer (age of onset: 62) in her sister; Lung cancer (age of onset: 75) in her mother; Throat cancer (age of onset: 76) in her father.    Social History     Tobacco Use   • Smoking status: Former Smoker     Packs/day: 1.00     Years: 44.00     Pack years: 44.00     Types: Cigarettes     Last attempt to quit: 2019     Years since quittin.0   • Smokeless tobacco: Never Used   Substance Use Topics   • Alcohol use: No     Frequency: Never   • Drug use: No       I have reviewed the history of present illness, past medical history, family history, social history, lab results, all notes and other records since the patient was last seen on Visit date not found.    SUBJECTIVE:  The patient is here for a follow up appointment today to discuss biopsy and PET scan results.  The patient is accompanied by several family members for this appointment.  The patient's family states that she is eating better. The patient states that she is feeling better in the past two weeks then she has in the past 6 months.   "          REVIEW OF SYSTEMS:  Review of Systems   Constitutional: Negative for chills and fever.   HENT: Negative for ear pain, mouth sores, nosebleeds and sore throat.    Eyes: Negative for photophobia and visual disturbance.   Respiratory: Negative for wheezing and stridor.    Cardiovascular: Negative for chest pain and palpitations.   Gastrointestinal: Negative for abdominal pain, diarrhea, nausea and vomiting.   Endocrine: Negative for cold intolerance and heat intolerance.   Genitourinary: Negative for dysuria and hematuria.   Musculoskeletal: Negative for joint swelling and neck stiffness.   Skin: Negative for color change and rash.   Neurological: Negative for seizures and syncope.   Hematological: Negative for adenopathy.        No obvious bleeding   Psychiatric/Behavioral: Negative for agitation, confusion and hallucinations.       OBJECTIVE:    Vitals:    10/09/19 1350   BP: 156/88   Pulse: 98   Resp: 16   Temp: 98.3 °F (36.8 °C)   Weight: 74.9 kg (165 lb 3.2 oz)   Height: 160 cm (63\")   PainSc:   5   PainLoc: Back       ECOG  (1) Restricted in physically strenuous activity, ambulatory and able to do work of light nature    Physical Exam   Constitutional: She is oriented to person, place, and time. No distress.   HENT:   Head: Normocephalic and atraumatic.   Eyes: Conjunctivae and EOM are normal. Right eye exhibits no discharge. Left eye exhibits no discharge. No scleral icterus.   Neck: Normal range of motion. Neck supple. No thyromegaly present.   Cardiovascular: Normal rate, regular rhythm and normal heart sounds. Exam reveals no gallop and no friction rub.   Pulmonary/Chest: Effort normal. No stridor. No respiratory distress. She has no wheezes.   Supraclavicular wound from biopsy       Abdominal: Soft. Bowel sounds are normal. She exhibits no mass. There is no tenderness. There is no rebound and no guarding.   Musculoskeletal: Normal range of motion. She exhibits no tenderness.   Lymphadenopathy:     " She has no cervical adenopathy.   Neurological: She is alert and oriented to person, place, and time. She exhibits normal muscle tone.   Skin: Skin is warm. No rash noted. She is not diaphoretic. No erythema.   Psychiatric: She has a normal mood and affect. Her behavior is normal.   Nursing note and vitals reviewed.      RECENT LABS      WBC   Date Value Ref Range Status   10/09/2019 13.90 (H) 3.40 - 10.80 10*3/mm3 Final   09/16/2019 13.7 (H) 3.4 - 10.8 x10E3/uL Final     RBC   Date Value Ref Range Status   10/09/2019 4.29 3.77 - 5.28 10*6/mm3 Final   09/16/2019 3.06 (L) 3.77 - 5.28 x10E6/uL Final     Hemoglobin   Date Value Ref Range Status   10/09/2019 10.3 (L) 12.0 - 15.9 g/dL Final     Hematocrit   Date Value Ref Range Status   10/09/2019 33.5 (L) 34.0 - 46.6 % Final     MCV   Date Value Ref Range Status   10/09/2019 78.1 (L) 79.0 - 97.0 fL Final     MCH   Date Value Ref Range Status   10/09/2019 24.0 (L) 26.6 - 33.0 pg Final     MCHC   Date Value Ref Range Status   10/09/2019 30.7 (L) 31.5 - 35.7 g/dL Final     RDW   Date Value Ref Range Status   10/09/2019 19.7 (H) 12.3 - 15.4 % Final     RDW-SD   Date Value Ref Range Status   10/09/2019 54.8 (H) 37.0 - 54.0 fl Final     MPV   Date Value Ref Range Status   10/09/2019 9.9 6.0 - 12.0 fL Final     Platelets   Date Value Ref Range Status   10/09/2019 340 140 - 450 10*3/mm3 Final     Neutrophil %   Date Value Ref Range Status   10/09/2019 77.1 (H) 42.7 - 76.0 % Final     Lymphocyte %   Date Value Ref Range Status   10/09/2019 16.3 (L) 19.6 - 45.3 % Final     Monocyte %   Date Value Ref Range Status   10/09/2019 5.0 5.0 - 12.0 % Final     Eosinophil %   Date Value Ref Range Status   10/09/2019 1.3 0.3 - 6.2 % Final     Basophil %   Date Value Ref Range Status   10/09/2019 0.3 0.0 - 1.5 % Final     Neutrophils, Absolute   Date Value Ref Range Status   10/09/2019 10.73 (H) 1.70 - 7.00 10*3/mm3 Final     Lymphocytes, Absolute   Date Value Ref Range Status   10/09/2019  2.26 0.70 - 3.10 10*3/mm3 Final     Monocytes, Absolute   Date Value Ref Range Status   10/09/2019 0.69 0.10 - 0.90 10*3/mm3 Final     Eosinophils, Absolute   Date Value Ref Range Status   10/09/2019 0.18 0.00 - 0.40 10*3/mm3 Final     Basophils, Absolute   Date Value Ref Range Status   10/09/2019 0.04 0.00 - 0.20 10*3/mm3 Final     nRBC   Date Value Ref Range Status   09/10/2019 0.1 0.0 - 0.2 /100 WBC Final       Lab Results   Component Value Date    GLUCOSE 113 (H) 10/04/2019    BUN 10 10/04/2019    CREATININE 1.10 (H) 10/04/2019    EGFRIFNONA 50 (L) 10/04/2019    EGFRIFAFRI 55 (L) 09/16/2019    BCR 9.1 10/04/2019    K 3.1 (L) 10/04/2019    CO2 26.0 10/04/2019    CALCIUM 9.2 10/04/2019    PROTENTOTREF 6.7 08/13/2019    ALBUMIN 3.60 10/04/2019    LABIL2 1.1 (L) 08/13/2019    AST 19 10/04/2019    ALT 12 (L) 10/04/2019         Assessment/Plan     Mediastinal mass  - CBC & Differential  - CBC Auto Differential      ASSESSMENT:    1. Large neuroendocrine tumor of the lung clinical stage IV with metastases to the liver, right adrenal gland and transverse process of T9. Mass encases the Pulmonary vessels and Left bronchus  2. Status post Right Mediport placement  3. History of peptic ulcer ulcer disease with recurrent GI bleed  4. History of anemia  5. History of laryngeal cancer status post radiation therapy 1999   6. History of right kidney cancer status post right nephrectomy in 2014    Discussion:  Has a large mediastinal mass encasing the left pulmonary vessel and bronchus.  She also has evidence of metastatic disease to the liver, right adrenal gland and transverse process of T9.  Pathology is a large neuroendocrine tumor.  I did let her know that her disease is unresectable.  She is a candidate for palliative chemotherapy would also recommend palliative chest radiation to help relieve  vascular and bronchial encasement.  Recommended palliative chemotherapy with combination of carboplatinum and etoposide.  I have  discussed the side effects of chemotherapy to include but not limited to:    Chemotherapy side effects include, but not limited to, nausea, vomiting, bone marrow suppression, which can result in blood, platelet transfusion. There is also risk of permanent bone marrow destruction, which can cause myelodysplastic syndrome or leukemia years down the line. There is risk of infection which can result in hospitalization and even death. There is also risk of fatigue, asthenia, alopecia which could become permanent. Chemo will help to reduce risk of relapse of cancer, but does not eliminate risk completely.  Also like to administer her chemotherapy with the help of Neulasta for cytokine support.  Wound like to start chemo next week        PLAN:  Schedule chemo next week  Foundation one testing  MRI brain to complete her staging  Refer to radiation oncology    I have reviewed labs results, imaging, vitals, and medications with the patient today. Will follow up in 2 weeks  with me.     I counselled Madalyn of the risks of continuing to use tobacco and cessation.    During this visit, I spent 3-10 minutes counseling the patient regarding tobacco cessation.     Patient verbalized understanding and is in agreement of the above plan.    Part of this document was scribed by Wendi Grewal RN, BSN.        Much of the above report is an electronic transcription/translation of the spoken language to printed text using Dragon Software. As such, the subtleties and finesse of the spoken language may permit erroneous, or at times, nonsensical words or phrases to be inadvertently transcribed; thus changes may be made at a later date to rectify these errors.

## 2019-10-10 PROBLEM — C34.90 PRIMARY LUNG CANCER (HCC): Status: ACTIVE | Noted: 2019-01-01

## 2019-10-10 NOTE — PROGRESS NOTES
Patient is seen in postoperative follow-up after scalene lymph node biopsy and bronchoscopy.  Biopsies reveal large cell neuroendocrine carcinoma.  Plan follow-up in 2 weeks for suture removal  There is no evidence of wound infection the wounds are healing nicely.

## 2019-10-11 ENCOUNTER — OFFICE (AMBULATORY)
Dept: URBAN - METROPOLITAN AREA CLINIC 64 | Facility: CLINIC | Age: 64
End: 2019-10-11

## 2019-10-11 VITALS
DIASTOLIC BLOOD PRESSURE: 92 MMHG | HEART RATE: 96 BPM | SYSTOLIC BLOOD PRESSURE: 143 MMHG | WEIGHT: 159 LBS | HEIGHT: 65 IN

## 2019-10-11 DIAGNOSIS — K25.7 CHRONIC GASTRIC ULCER WITHOUT HEMORRHAGE OR PERFORATION: ICD-10-CM

## 2019-10-11 DIAGNOSIS — T40.2X5A ADVERSE EFFECT OF OTHER OPIOIDS, INITIAL ENCOUNTER: ICD-10-CM

## 2019-10-11 DIAGNOSIS — C34.90 MALIGNANT NEOPLASM OF UNSPECIFIED PART OF UNSPECIFIED BRONCH: ICD-10-CM

## 2019-10-11 DIAGNOSIS — K90.9 INTESTINAL MALABSORPTION, UNSPECIFIED: ICD-10-CM

## 2019-10-11 DIAGNOSIS — K55.20 ANGIODYSPLASIA OF COLON WITHOUT HEMORRHAGE: ICD-10-CM

## 2019-10-11 DIAGNOSIS — Z86.010 PERSONAL HISTORY OF COLONIC POLYPS: ICD-10-CM

## 2019-10-11 DIAGNOSIS — D50.0 IRON DEFICIENCY ANEMIA SECONDARY TO BLOOD LOSS (CHRONIC): ICD-10-CM

## 2019-10-11 DIAGNOSIS — K25.9 GASTRIC ULCER, UNSPECIFIED AS ACUTE OR CHRONIC, WITHOUT HEMO: ICD-10-CM

## 2019-10-11 DIAGNOSIS — K59.00 CONSTIPATION, UNSPECIFIED: ICD-10-CM

## 2019-10-11 DIAGNOSIS — K31.819 ANGIODYSPLASIA OF STOMACH AND DUODENUM WITHOUT BLEEDING: ICD-10-CM

## 2019-10-11 PROCEDURE — 99214 OFFICE O/P EST MOD 30 MIN: CPT | Performed by: INTERNAL MEDICINE

## 2019-10-11 NOTE — TELEPHONE ENCOUNTER
Call ms munson to tell that I made her chemo appointment for next week stated that she is not taking any  treatment

## 2019-10-14 NOTE — TELEPHONE ENCOUNTER
Case Management/ Note    Patient Name: Madalyn Navas  YOB: 1955  MRN #: [id number]    OSW called patient at the request of Dr. Rivas. Patient is alert and oriented to person, place and time. She states she does not want treatment for cancer and wants to be admitted to hospice. She wants to be admitted to [facility] and said she knows two nurses from there and would like to have them care for her. She said she has multiple medical problems and does not want to try treatment with stage 4 disease. She does want to see Dr. Tang tomorrow to assure she is fully informed about everything. She was encouraged to do this. Will need order from Dr. Rivas for hospice.     Electronically signed by:   Simona Key LCSW, OSW-C  10/14/19, 4:08 PM

## 2019-10-15 NOTE — PROGRESS NOTES
Radiation Oncology Consult Note    Name: Madalyn Navas  YOB: 1955  MRN #: [id number]  Date of Service: 10/15/2019  Requesting Provider: Bryanna Rivas MD  [address] [address]  Primary Care Provider: Hollis Adrian MD  I was asked to see the patient at the request of the referring provider noted below for advice and recommendations regarding this diagnosis and the role of radiation therapy.  RECORDS OBTAINED:  Records of the patients history including those obtained from the referring provider were reviewed and summarized in detail.      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 She declined systemic therapy and has opted for hospice.     REASON FOR CONSULTATION/CHIEF COMPLAINT:  Lung cancer     HISTORY OF PRESENT ILLNESS:    · She notes she initially presented because she was found to anemic and had an EGD which showed a stomach ulcer. As part of further workup of discomfort that she was having she was found to have an abnormal CXR. She had a subsequent CT scan.  · 10/1/19 CT scan of the chest revealed a large mass centered in the left mediastinal at the AP window measuring 9.4 cm and encasing the pulmonary artery and the left bronchus.  There was pathologically enlarged left supraclavicular lymph nodes.  There may be evidence of postobstructive pneumonitis.  There was a mass in the liver that measures 2.3 cm on the right lobe suspicious for metastatic disease.  There was probable right adrenal adenoma.  There was also a 4.5 mm nodule in the right middle lobe of unclear etiology.  · 10/4/2019: She underwent left scalene node biopsy and Mediport placement by Dr. Lewis.  Pathology revealed large cell neuroendocrine  tumor of the lung.  · 10/8/2019-patient had a PET CT scan which showed a 3.7 cm left supraclavicular lymph node with an SUV of 8.1.  There is a 9.4 cm left anterior mediastinal mass with uptake with SUV of 12.3.  There may be some associated mediastinal and left hilar lymphadenopathy.  There is focal uptake within the right hepatic lobe with an SUV of 7.3.  There is also uptake in the right adrenal gland with an SUV of 0.8.  There is an uptake on T9 transverse process with SUV of 4 likely representing metastasis  · Patient saw Dr. Rivas in medical oncology to discuss options however ultimately she has decided that she does not want any systemic therapy  · She presents today to discuss her options. She has some discomfort that seems to be managed with current medications. I discussed her scans with her. She stated her goals are to live a little longer but she does not want any chemotherapy. She notes significant weight loss and dysphagia. She has neck stiffness and fatigue.        The following portions of the patient's history were reviewed and updated as appropriate: allergies, current medications, past family history, past medical history, past social history, past surgical history and problem list. Reviewed with the patient and remain unchanged.    PAST MEDICAL HISTORY:  she  has a past medical history of Abnormal mammogram, Acute bronchitis, Acute non-infective otitis externa of right ear, Anemia, unspecified, Angular cheilitis, Anxiety, Bloody stool, Breast mass, left, Cancer of kidney (CMS/HCC), Cellulitis of right upper extremity, Cerebral ischemia, Cerebrovascular accident, old, Cervical cancer screening, Cervical disc disorder with myelopathy, Closed fracture of bone, COPD (chronic obstructive pulmonary disease) (CMS/HCC), Cough, Cramps, muscle, general, Degeneration of cervical intervertebral disc, Degenerative disc disease, lumbar, Diaphragmatic hernia, Edema, Extrapyramidal and movement disorders in  diseases classified elsewhere, Fever presenting with conditions classified elsewhere, Fibromyalgia, Folliculitis, Fracture of right hip, closed, with nonunion, subsequent encounter, Fracture, intertrochanteric, right femur (CMS/HCC), Fusion of lumbar spine, Gastritis with hemorrhage, GERD (gastroesophageal reflux disease), H/O total hysterectomy with bilateral salpingo-oophorectomy (BSO), Hip pain, left, History of Nissen fundoplication, History of tobacco use, cholecystectomy, Hypokalemia, Initial Medicare annual wellness visit, Internal derangement of shoulder, right, Iron deficiency anemia, unspecified, Irritable bowel syndrome without diarrhea, Knee pain, Laryngeal cancer (CMS/HCC), Leg pain, Lumbar disc disorder with myelopathy, Lung cancer (CMS/HCC), Malaise and fatigue, Malignant neoplasm of thyroid gland (CMS/HCC), Medicare annual wellness visit, subsequent, Menopausal syndrome, Mixed hyperlipidemia, Multiple pathological fractures, Neuralgia, Neuropathy, Occlusion of left carotid artery, Orthostatic hypotension, Osteoporosis, Other specified abdominal hernia without obstruction or gangrene, Other specified persistent mood disorders (CMS/HCC), Overweight (BMI 25.0-29.9), Pain of both hip joints, Pelvic fracture (CMS/HCC), Peptic ulceration, Polyarthralgia, Positive depression screening, S/P lumbar spine operation, Screening for alcoholism, Screening for osteoporosis, Seroma, post-traumatic (CMS/HCC), Sinusitis, Thyroid cancer (CMS/HCC), Tobacco use, Tremor, Unsteady gait, UTI (urinary tract infection), and Visit for screening mammogram.    MEDICATIONS:   Current Outpatient Medications:   •  Calcium Carbonate-Vitamin D (CALCIUM 500 + D) 500-125 MG-UNIT tablet, Take 1 tablet by mouth Daily., Disp: , Rfl:   •  cyanocobalamin (VITAMIN B-12) 500 MCG tablet, Take 2 tablets by mouth Daily., Disp: , Rfl:   •  DULoxetine (CYMBALTA) 60 MG capsule, Take 1 capsule by mouth 2 (Two) Times a Day., Disp: , Rfl:   •   gabapentin (NEURONTIN) 800 MG tablet, TAKE ONE TABLET BY MOUTH FOUR TIMES A DAY, Disp: 120 tablet, Rfl: 12  •  hydrochlorothiazide (HYDRODIURIL) 50 MG tablet, TAKE ONE TABLET BY MOUTH ONCE DAILY, Disp: 30 tablet, Rfl: 5  •  linaclotide (LINZESS) 290 MCG capsule capsule, Take 1 capsule by mouth Daily., Disp: , Rfl:   •  methadone (DOLOPHINE) 10 MG tablet, Take 1 tablet by mouth Every 8 (Eight) Hours As Needed for Moderate Pain  or Severe Pain ,, Disp: 90 tablet, Rfl: 0  •  nystatin-triamcinolone (MYCOLOG II) 836545-3.1 UNIT/GM-% cream, , Disp: , Rfl:   •  oxyCODONE-acetaminophen (PERCOCET)  MG per tablet, Take 1 tablet by mouth 4 (Four) Times a Day As Needed for Moderate Pain ., Disp: 120 tablet, Rfl: 0  •  potassium chloride (KLOR-CON) 20 MEQ CR tablet, Take 1 tablet by mouth 2 (Two) Times a Day., Disp: , Rfl:   •  PROAIR  (90 Base) MCG/ACT inhaler, INHALE 2 PUFFS EVERY 6 HOURS AS NEEDED FOR SHORTNESS OF AIR, Disp: 8.5 g, Rfl: 5  •  rosuvastatin (CRESTOR) 10 MG tablet, Take 1 tablet by mouth Daily., Disp: , Rfl:   •  sertraline (ZOLOFT) 100 MG tablet, Take 2 tablets by mouth every night at bedtime., Disp: 60 tablet, Rfl: 12    ALLERGIES:   Allergies   Allergen Reactions   • Meperidine Swelling     demerol       PAST SURGICAL HISTORY: she has a past surgical history that includes Shoulder surgery (Right, 11/07/2017); Hip surgery (Right); Nephrectomy (07/09/2013); Total abdominal hysterectomy w/ bilateral salpingoophorectomy; Colonoscopy (N/A, 9/10/2019); Esophagogastroduodenoscopy (N/A, 9/10/2019); Back surgery; Fracture surgery (Right); Hernia repair; Appendectomy; Venous Access Device (Port) (Right, 10/4/2019); Cervical Lymph Node Biopsy/Excision (Left, 10/4/2019); Bronchoscopy (N/A, 10/4/2019); and Cholecystectomy.    PREVIOUS RADIOTHERAPY OR CHEMOTHERAPY: Yes She received radiation therapy to the larynx in 1999 at [facility].     FAMILY HISTORY: her family history includes Cancer in an other family  member; Diabetes in an other family member; Lung cancer (age of onset: 62) in her sister; Lung cancer (age of onset: 75) in her mother; Throat cancer (age of onset: 76) in her father.    SOCIAL HISTORY: she  reports that she has been smoking cigarettes.  She has smoked for the past 44.00 years. She has never used smokeless tobacco. She reports that she does not drink alcohol or use drugs.    PAIN AND PAIN MANAGEMENT: controlled     NUTRITIONAL STATUS:  thin    KPS: 70:  Cares for self; unable to carry on nomal activity  ECOG: (2) Ambulatory and capable of self care, unable to carry out work activity, up and about > 50% or waking hours       Review of Systems:   Review of Systems   Constitutional: Positive for fatigue and unexpected weight change. Negative for activity change, appetite change, chills, diaphoresis and fever.        Lost 32 lbs in 3 months   HENT: Positive for voice change. Negative for congestion, dental problem, drooling, ear discharge, ear pain, facial swelling, hearing loss, mouth sores, nosebleeds, postnasal drip, rhinorrhea, sinus pressure, sinus pain, sneezing, sore throat, tinnitus and trouble swallowing.    Eyes: Negative for photophobia, pain, discharge, redness, itching and visual disturbance.   Respiratory: Positive for cough and shortness of breath. Negative for apnea, choking, chest tightness, wheezing and stridor.         Clear yellowish green sputum   Cardiovascular: Negative for chest pain, palpitations and leg swelling.   Gastrointestinal: Negative for abdominal distention, abdominal pain, anal bleeding, blood in stool, constipation, diarrhea, nausea, rectal pain and vomiting.   Endocrine: Positive for polydipsia. Negative for cold intolerance, heat intolerance, polyphagia and polyuria.   Genitourinary: Negative for difficulty urinating, dysuria, flank pain, frequency and hematuria.   Musculoskeletal: Positive for arthralgias, back pain, myalgias, neck pain and neck stiffness.  "Negative for gait problem and joint swelling.   Skin: Negative for color change, pallor, rash and wound.   Allergic/Immunologic: Negative for environmental allergies, food allergies and immunocompromised state.   Neurological: Negative for dizziness, tremors, seizures, syncope, facial asymmetry, speech difficulty, weakness, light-headedness, numbness and headaches.   Hematological: Negative for adenopathy. Does not bruise/bleed easily.   Psychiatric/Behavioral: Negative for agitation, behavioral problems, confusion, decreased concentration, dysphoric mood, hallucinations, self-injury, sleep disturbance and suicidal ideas. The patient is nervous/anxious. The patient is not hyperactive.           Objective     Vitals:  Vitals:    10/15/19 1435   BP: 150/81   Pulse: 105   Temp: 98.3 °F (36.8 °C)   TempSrc: Oral   SpO2: 96%   Weight: 72.9 kg (160 lb 12.8 oz)   Height: 160 cm (63\")   PainSc:   6   PainLoc: Back         PHYSICAL EXAM:  GENERAL: in no apparent distress, sitting comfortably in room. hoarse    HEENT: normocephalic, atraumatic. Pupils are equal, round, reactive to light. Sclera anicteric. Conjunctiva not injected. Oropharynx without erythema, ulcerations or thrush.   NECK: Supple  LYMPHATIC: L supraclavicular adenopathy  CARDIOVASCULAR: S1 & S2 detected; no murmurs, rubs or gallops.  CHEST: wheezes bilaterally.  ABDOMEN: bowel sounds present. Abdomen is soft, nontender, nondistended.   MUSCULOSKELETAL: no tenderness to palpation along the spine or scapulae. Normal range of motion.  EXTREMITIES: no clubbing, cyanosis,+ edema.  SKIN: no erythema, rashes, ulcerations noted.   NEUROLOGIC: cranial nerves II-XII grossly intact bilaterally. No focal neurologic deficits.  PSYCHIATRIC:  alert, aware, and appropriate.    PERTINENT IMAGING/PATHOLOGY/LABS (Medical Decision Making):     COORDINATION OF CARE: A copy of this note is sent to the referring provider.    PATHOLOGY (Reviewed):   Case Report   Surgical Pathology " "Report                         Case: [id number]                                   Authorizing Provider:  Dougie Lewis MD      Collected:           10/04/2019 08:08 AM           Ordering Location:     [facility] MAIN  Received:            10/04/2019 08:19 AM                                  OR                                                                            Pathologist:           Randall Park MD                                                              Specimens:   1) - Lymph Node                                                                                      2) - Lymph Node                                                                            Clinical Information    SCALENE LYMPH NODE   Final Diagnosis   Specimen #1 (Lymph node, scalene, excision):    Positive for malignancy, metastatic large cell neuroendocrine carcinoma    See comment     Specimen #2 (Lymph node, scalene, excision):    Positive for malignancy, metastatic large cell neuroendocrine carcinoma    See comment     JPR/sms       Electronically signed by Randall Park MD on 10/7/2019 at 1559   Comment    Immunohistochemistry was performed utilizing appropriate controls and the tumor is positive for p63, CD56 and synaptophysin and is negative for napsin A, TTF-1, chromogranin, and CK5/6. The findings are most consistent with large cell neuroendocrine carcinoma. The morphology of the tumor cells is not consistent with small cell carcinoma. Further clinical workup is required.    Intraoperative Consultation    1. FSDx: High grade malignant neoplasm.   Gross Description    Specimen #1: Received designated \"Scalene lymph node\" is an ovoid pink tissue portion measuring 1.4 x 0.9 x 0.4 cm. It is entirely frozen and subsequently submitted in one cassette.      Specimen #2 is received labeled \"Scalene lymph node.\" Received in formalin are two fragments of yellow-tan soft tissue the largest of which is firm and measures 1.1 " cm. The specimen is serially sectioned and submitted in its entirety in screen cassette 2.      IRINA/JPR/[employer]           IMAGING (Reviewed):   NM PET SKULL BASE TO MID THIGH-Date of Exam: 10/8/2019 12:12 PM     Indication: lung and mediastinal mass; J98.59-Other diseases of  mediastinum, not elsewhere classified; R91.1-Solitary pulmonary nodule.     Comparison Exams: CT chest from 10/01/2019     Technique: Whole body PET/CT imaging was performed with positron  emission tomography (PET with concurrently acquired computed tomography  (CT) for attenuation correction and anatomical localization); field of  view imaged was the skull base to midthigh. Images were obtained after  one hour of equilibrium.     Blood glucose level: 91 mg/dL.  FDG dosage: 16.26 mCi F-18.     Automated exposure control and iterative reconstruction methods were  used.     FINDINGS:  Neck:     There is a 3.7 x 1.6 cm FDG avid left supraclavicular lymph node with  SUV max 8.1, most compatible with malignancy. Some adjacent gas locules  are present, likely secondary to recent biopsy. Physiologic FDG avidity  is noted within the visualized brain parenchyma and orbits.     Chest:     A 9.4 cm left anterior mediastinal mass has significant FDG avidity with  SUV max 12.3. There may be some associated mediastinal and left hilar  lymphadenopathy, though evaluation is difficult given the large size of  the mediastinal mass. No abnormally enlarged or FDG avid right hilar or  axillary lymph nodes are identified. There is a consolidation within the  lingula, likely representing a postobstructive pneumonia. No discrete  pulmonary nodule is identified. A right subclavian port has its tip at  the cavoatrial junction.     Abdomen/pelvis:     There is focal FDG avidity within the anterior right hepatic lobe in the  area of the previously identified hypodensity on CT chest, with SUV max  7.3, likely representing metastasis. Additionally, there is FDG avidity  within  a right adrenal nodule as identified on recent CT chest with SUV  max 0.8, suspicious for metastasis as well. No abnormally enlarged or  FDG avid lymph nodes are identified. Physiologic FDG avidity is noted  within the solid organs and bowel.     Bones:     There is an FDG avid lucent lesion within the left T9 transverse process  with SUV max 4.0, likely representing metastasis.     IMPRESSION:  1.Large left anterior mediastinal mass has significant FDG avidity, most  compatible with primary malignancy.  2.FDG avid left supraclavicular lymphadenopathy. It appears this lymph  node was recently biopsied. Recommend correlation with pathology  results.  3.FDG avid lesion within right hepatic lobe, likely representing  metastasis.  4.FDG avid right adrenal nodule, suspicious for metastasis.  5.FDG avid lucent lesion involving left T9 transverse process, likely  representing an osseous metastasis.    CT CHEST W CONTRAST-Date of Exam: 10/1/2019 10:51 AM     Indication: Neoplasm: chest, lung, suspected; R93.89-Abnormal findings  on diagnostic imaging of other specified body structures. Smoker. Cough.  History of laryngeal cancer.     Comparison: Chest radiograph [facility] 09/26/2019     Technique: Serial and axial CT images of the chest were obtained  following the uneventful intravenous administration of 50 cc Isovue-370  contrast. Reconstructions in the coronal and sagittal planes were also  performed.  Automated exposure control and iterative reconstruction  methods were used.     FINDINGS:     Hilum and Mediastinum: There is enlarged supraclavicular lymph node on  the left on image #13 measuring 3.2 x 3.1 cm. There is a large lobular  mass centered in the AP window, surrounding the right pulmonary artery  the left upper lobe bronchus majority of the left mainstem bronchus. On  image #43 the large mass measures 9.27 m anterior posterior by 6.6 cm  transverse by 9.47 m craniocaudal, reference image #41 and  the coronal  reconstructions. No pericardial effusion.     Lung Parenchyma and Pleura: There is consolidation near completely  involving the lingula may be postobstructive or due to tumor. It  measures about 7.4 x 3.2 cm image #55. There is septal thickening in the  left upper lobe. The left upper lobe bronchus is not well aerated. There  is mild diffuse bronchial wall thickening. There is a nodule along the  fissure on the right on image #61 which appears to be a calcified  granuloma. There is a nodule in the right middle lobe on image #64  measuring about 5 mm. It is noncalcified. No significant pleural  effusion.     Upper Abdomen: There is a mass in the right adrenal gland. It measures  about 1.8 x 1 cm. On the CT lumbar spine it had density of -11  Hounsfield units is most likely a benign adenoma. There is a cyst of the  superior pole of the right kidney. There are changes of left  nephrectomy. There is low-density in the right anterior hepatic lobe on  image #91. This measures about 2.3 cm. This is suspicious for metastatic  disease. Patient status post cholecystectomy. The bile duct is dilated  to by 1.4 cm.     Soft tissues: There is a wide necked fat-containing midline upper  abdominal wall hernia measuring about 4.4 x 1.8 cm.     Osseous structures: No aggressive focal lytic or sclerotic osseous  lesions.     IMPRESSION:  1.There is a large mass centered in the left mediastinum at the AP  window measuring at least 9.4 cm in size encasing the pulmonary artery  and left bronchus. There are pathologically enlarged left  supraclavicular lymph nodes. There is airspace opacity in the lingula  that could be seen with tumor involvement or postobstructive pneumonia.  2.Low-density focus in the right anterior hepatic lobe not well  characterized. Malignancy cannot be excluded. This could be due to  metastatic disease or less likely a benign lesion. Dedicated liver MRI  with and without contrast is  recommended.  3.Probable adenoma right adrenal gland. This could be better evaluated  on the liver MRI.  4.5 mm nodule in the right middle lobe not well characterized.  5.Status post right nephrectomy.      LABS (Reviewed):  Hematology WBC   Date Value Ref Range Status   10/09/2019 13.90 (H) 3.40 - 10.80 10*3/mm3 Final   09/16/2019 13.7 (H) 3.4 - 10.8 x10E3/uL Final     RBC   Date Value Ref Range Status   10/09/2019 4.29 3.77 - 5.28 10*6/mm3 Final   09/16/2019 3.06 (L) 3.77 - 5.28 x10E6/uL Final     Hemoglobin   Date Value Ref Range Status   10/09/2019 10.3 (L) 12.0 - 15.9 g/dL Final     Hematocrit   Date Value Ref Range Status   10/09/2019 33.5 (L) 34.0 - 46.6 % Final     Platelets   Date Value Ref Range Status   10/09/2019 340 140 - 450 10*3/mm3 Final      Chemistry   Lab Results   Component Value Date    GLUCOSE 113 (H) 10/04/2019    BUN 10 10/04/2019    CREATININE 1.10 (H) 10/04/2019    EGFRIFNONA 50 (L) 10/04/2019    EGFRIFAFRI 55 (L) 09/16/2019    BCR 9.1 10/04/2019    K 3.1 (L) 10/04/2019    CO2 26.0 10/04/2019    CALCIUM 9.2 10/04/2019    PROTENTOTREF 6.7 08/13/2019    ALBUMIN 3.60 10/04/2019    LABIL2 1.1 (L) 08/13/2019    AST 19 10/04/2019    ALT 12 (L) 10/04/2019           ASSESSMENT AND PLAN:63 yo F with 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 She declined systemic therapy and has opted for hospice.   · Due to concern for compression on carotid artery, pulmonary artery, and bronchus I recommend palliative radiation therapy to both sites 30Gy in 10 fractions. Goal of treatment would be to slow tumor from closing off these vessels and airway.   · Benefits and risks of therapy were discussed with the patient and she would like to proceed with treatment. She underwent radiation simulation on 10/17/19.        1.  Primary malignant neoplasm of left lung (CMS/HCC)    2. Malignant neoplasm of thyroid gland (CMS/HCC)    3. Laryngeal cancer (CMS/HCC)         No orders of the defined types were placed in this encounter.      This assessment comes from my review of the imaging, pathology, physician notes and other pertinent information as mentioned.      TIME SPENT WITH PATIENT:   I spent time counseling and coordination of care, including review of imaging and pathology; indications, goals, logistics, alternatives and risks - both common and rare - for my recommendations as well as surveillance and potential outcomes.         CC: Bryanna Rivas,* Hollis Adrian MD Parag Sevak Ramesh, MD  10/15/2019  3:40 PM

## 2019-10-16 NOTE — TELEPHONE ENCOUNTER
She is checking on the form that needed a signature and we were to fax it to them after her appointment regarding genetic testing.

## 2019-10-22 NOTE — PROGRESS NOTES
Subjective   Madalyn Navas is a 64 y.o. female.     Chief Complaint   Patient presents with   • Pain   Recently diagnosed with metastatic cancer    Pain   This is a chronic problem. The current episode started more than 1 year ago. The problem occurs constantly. The problem has been unchanged. Pertinent negatives include no abdominal pain, chest pain, fatigue, fever, joint swelling, nausea, neck pain, numbness, rash, swollen glands, vomiting or weakness. The symptoms are aggravated by exertion and walking. Treatments tried: gabapentin, methadone, percocet. The treatment provided significant relief.        The following portions of the patient's history were reviewed and updated as appropriate: allergies, current medications, past family history, past medical history, past social history, past surgical history and problem list.    Allergies:  Allergies   Allergen Reactions   • Meperidine Swelling     demerol       Social History:  Social History     Socioeconomic History   • Marital status:      Spouse name: Not on file   • Number of children: Not on file   • Years of education: Not on file   • Highest education level: Not on file   Tobacco Use   • Smoking status: Current Every Day Smoker     Years: 44.00     Types: Cigarettes   • Smokeless tobacco: Never Used   • Tobacco comment: 2-3 cigarettes a day   Substance and Sexual Activity   • Alcohol use: No     Frequency: Never   • Drug use: No   • Sexual activity: Defer       Family History:  Family History   Problem Relation Age of Onset   • Cancer Other         Lung   • Diabetes Other    • Lung cancer Mother 75        Smoker   • Throat cancer Father 76        Smoker   • Lung cancer Sister 62        Smoker       Past Medical History :  Patient Active Problem List   Diagnosis   • Abnormal mammogram   • Anemia   • Angular cheilitis   • Breast mass, left   • Cerebral infarction due to unspecified occlusion or stenosis of unspecified carotid artery (CMS/HCC)   •  Spinal stenosis of cervical region   • Degenerative joint disease of right acromioclavicular joint   • Cellulitis of right upper extremity   • Diaphragmatic hernia   • Edema   • Extrapyramidal and movement disorders in diseases classified elsewhere   • Fusion of lumbar spine   • Gastritis with hemorrhage   • Gastroesophageal reflux disease   • H/O total hysterectomy with bilateral salpingo-oophorectomy (BSO)   • History of Nissen fundoplication   • Hyperlipidemia   • Hypokalemia   • Malaise and fatigue   • Irritable bowel syndrome without diarrhea   • Laryngeal cancer (CMS/HCC)   • Malignant neoplasm of thyroid gland (CMS/HCC)   • Menopausal syndrome   • Multiple pathological fractures   • Neuralgia   • Neuropathy   • Occlusion of left carotid artery   • Osteoarthritis   • Other cervical disc degeneration, unspecified cervical region   • Iron deficiency anemia   • Other postprocedural states   • Other specified persistent mood disorders (CMS/HCC)   • Multiple joint pain   • Tremor   • Unsteady gait   • Pain of left upper extremity   • Flat back syndrome   • Lumbar stenosis with neurogenic claudication   • Vascular disease   • Acute GI bleeding   • Abnormal CXR   • Lung mass   • Primary lung cancer (CMS/HCC)       Medication List:    Current Outpatient Medications:   •  albuterol sulfate HFA (PROAIR HFA) 108 (90 Base) MCG/ACT inhaler, Inhale 2 puffs Every 6 (Six) Hours As Needed for Wheezing., Disp: 8.5 g, Rfl: 5  •  DULoxetine (CYMBALTA) 60 MG capsule, Take 1 capsule by mouth 2 (Two) Times a Day., Disp: 30 capsule, Rfl: 5  •  gabapentin (NEURONTIN) 800 MG tablet, TAKE ONE TABLET BY MOUTH FOUR TIMES A DAY, Disp: 120 tablet, Rfl: 12  •  hydroCHLOROthiazide (HYDRODIURIL) 50 MG tablet, Take 1 tablet by mouth Daily., Disp: 30 tablet, Rfl: 5  •  linaclotide (LINZESS) 290 MCG capsule capsule, Take 1 capsule by mouth Daily., Disp: 30 capsule, Rfl: 5  •  methadone (DOLOPHINE) 10 MG tablet, Take 1 tablet by mouth Every 8  (Eight) Hours As Needed for Moderate Pain  or Severe Pain ,, Disp: 90 tablet, Rfl: 0  •  oxyCODONE-acetaminophen (PERCOCET)  MG per tablet, Take 1 tablet by mouth 4 (Four) Times a Day As Needed for Moderate Pain ., Disp: 120 tablet, Rfl: 0  •  potassium chloride (KLOR-CON) 20 MEQ CR tablet, Take 1 tablet by mouth 2 (Two) Times a Day., Disp: , Rfl:   •  sertraline (ZOLOFT) 100 MG tablet, Take 2 tablets by mouth every night at bedtime., Disp: 60 tablet, Rfl: 12    Past Surgical History:  Past Surgical History:   Procedure Laterality Date   • APPENDECTOMY     • BACK SURGERY      lumber   • BRONCHOSCOPY N/A 10/4/2019    Procedure: DIAGNOSTIC BRONCHOSCOPY;  Surgeon: Dougie Lewis MD;  Location: [facility] OR;  Service: Cardiothoracic   • CERVICAL LYMPH NODE BIOPSY/EXCISION Left 10/4/2019    Procedure: SCALENE LYMPH NODE BIOPSY;  Surgeon: Dougie Lewis MD;  Location: [facility] OR;  Service: Cardiothoracic   • CHOLECYSTECTOMY     • COLONOSCOPY N/A 9/10/2019    Procedure: COLONOSCOPY;  Surgeon: Nain Rosales MD;  Location: [facility] ENDOSCOPY;  Service: Gastroenterology   • ENDOSCOPY N/A 9/10/2019    Procedure: ESOPHAGOGASTRODUODENOSCOPY with biopsy X1;  Surgeon: Nain Rosales MD;  Location: [facility] ENDOSCOPY;  Service: Gastroenterology   • FRACTURE SURGERY Right     hip   • HERNIA REPAIR      chest   • HIP SURGERY Right     ORIF right hip   • NEPHRECTOMY  07/09/2013    Right, [facility].   • SHOULDER SURGERY Right 11/07/2017    arthroplasty   • TOTAL ABDOMINAL HYSTERECTOMY WITH SALPINGO OOPHORECTOMY     • VENOUS ACCESS DEVICE (PORT) INSERTION Right 10/4/2019    Procedure: INSERTION OF  RIGHT SUBCLAVIAN MEDIPORT  WITH FLUOROSCOPIC GUIDENCE;  Surgeon: Dougie Lewis MD;  Location: [facility] OR;  Service: Cardiothoracic       Review of Systems:  Review of Systems   Constitutional: Negative for activity change, appetite change, fatigue and fever.   HENT: Negative for ear pain, swollen  "glands and voice change.    Eyes: Negative for visual disturbance.   Respiratory: Negative for shortness of breath and wheezing.    Cardiovascular: Negative for chest pain and leg swelling.   Gastrointestinal: Negative for abdominal pain, blood in stool, constipation, diarrhea, nausea and vomiting.   Endocrine: Negative for polydipsia and polyuria.   Genitourinary: Negative for dysuria, frequency and hematuria.   Musculoskeletal: Negative for joint swelling, neck pain and neck stiffness.   Skin: Negative for rash and bruise.   Neurological: Negative for weakness, numbness and headache.   Psychiatric/Behavioral: Negative for suicidal ideas and depressed mood.       Physical Exam:  Vital Signs:  Visit Vitals  /58   Pulse 72   Temp 98.4 °F (36.9 °C)   Resp 18   Ht 157.5 cm (62\")   Wt 73.3 kg (161 lb 9.6 oz)   SpO2 95%   BMI 29.56 kg/m²       Physical Exam   Constitutional: She is oriented to person, place, and time. She appears well-developed and well-nourished.   HENT:   Head: Normocephalic and atraumatic.   Left Ear: External ear normal.   Nose: Nose normal.   Mouth/Throat: Oropharynx is clear and moist.   Eyes: EOM are normal. Pupils are equal, round, and reactive to light.   Neck: Normal range of motion. Neck supple.   Cardiovascular: Normal rate, regular rhythm and normal heart sounds.   Pulmonary/Chest: Effort normal.   Abdominal: Soft. Bowel sounds are normal.   Musculoskeletal: Normal range of motion.   Neurological: She is alert and oriented to person, place, and time.   Skin: Skin is warm and dry.   Psychiatric: She has a normal mood and affect. Her behavior is normal. Judgment and thought content normal.       Assessment and Plan:  Problem List Items Addressed This Visit     Spinal stenosis of cervical region - Primary    Degenerative joint disease of right acromioclavicular joint    Edema    Overview     dependent. BP noted. Baseline labs ok. on hctz, try increase to 50 mg qd         Relevant " Medications    hydroCHLOROthiazide (HYDRODIURIL) 50 MG tablet    Irritable bowel syndrome without diarrhea    Overview     constipation predominant         Other specified persistent mood disorders (CMS/HCC)    Overview     worsening. Try zoloft. Follow-up in one month, sooner for concerns. Watch for serotonin sx given concurrent cymbalta  anxiety with panic attack         Relevant Medications    DULoxetine (CYMBALTA) 60 MG capsule    sertraline (ZOLOFT) 100 MG tablet      Other Visit Diagnoses     Anxiety        Relevant Medications    sertraline (ZOLOFT) 100 MG tablet    Constipation, unspecified constipation type        Relevant Medications    linaclotide (LINZESS) 290 MCG capsule capsule    Cough        Relevant Medications    albuterol sulfate HFA (PROAIR HFA) 108 (90 Base) MCG/ACT inhaler      Findings discussed. All questions answered.  Medication and medication adverse effects discussed.  Drug education given and explained to patient. Patient verbalized understanding.  Pt in hospice, will follow expectantly    An After Visit Summary and PPPS were given to the patient.

## 2019-11-04 NOTE — TELEPHONE ENCOUNTER
Patient called stating she felt like she had a knot in her throat that was making it hard to swallow.  I spoke with Dr. Tang and he says that the know feeling is normal and should subside in a little bit.  He did order Viscous Lidocaine 2% to help and instructed patient to use boost and ensure to supplement feedings because liquid goes down a little easier.  I notified the Hospice Nurse Chiara at [phone] and she stated she would get the medication to her.  I also called Patient with the instructions and she verbalized understanding.

## 2019-11-08 NOTE — TELEPHONE ENCOUNTER
spoke with pts daughter Pina Meneses she scheduled her mom's PET scan. I transferred to nurse triage she has questions re: oxygen, shower chair, etc.

## 2019-11-26 PROBLEM — C34.92: Status: ACTIVE | Noted: 2019-01-01

## 2019-11-26 NOTE — PROGRESS NOTES
Patient is seen in follow-up of her lung cancer.  She is received radiation therapy.  She is declined chemotherapy.  She is on hospice.  Her Mediport is not being accessed.  The patient describes discomfort at the port site without any signs or symptoms of infection.  She would like to have it removed.  She is certain that she does not want to receive chemotherapy.  I have confirmed this with the patient.  She is completed palliative radiation therapy and is feeling better.  Had a large left upper lobe mass involving the mediastinum and significant supraclavicular lymphadenopathy.  Currently the patient is not having any fevers chills or night sweats.  She continues to be hoarse.  She has a history of laryngeal cancer.  She does continue to smoke.  In view of her terminal status no effort is been made to get her to quit smoking.  Review of systems no active head neck or chest pain.  Physical examination she is well-appearing no distress she walks with a cane.  Sclerae anicteric neck is supple no residual supraclavicular lymphadenopathy is present.  Symmetrical chest expansion.  Mild wheezing present bilaterally.  Cardiac exam regular rate and rhythm no JVD abdomen nondistended nontender extremities free of cyanosis clubbing or edema mentation normal.    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  Mediport removal for comfort purposes.

## 2019-12-02 NOTE — DISCHARGE INSTRUCTIONS
Elevate head of bed 30 degrees.  Leave outer bandage on for 3 days.  Leave Steri-Strips in place for 3 weeks.  Return to clinic in 3 weeks for suture removal.  Last Dose of Pain Med at 2:11pm

## 2019-12-02 NOTE — ANESTHESIA POSTPROCEDURE EVALUATION
Patient: Madalyn Navas    Procedure Summary     Date:  12/02/19 Room / Location:  [facility] OR 10 / [facility] MAIN OR    Anesthesia Start:  1248 Anesthesia Stop:  1328    Procedure:  REMOVAL VENOUS ACCESS DEVICE (Right Chest) Diagnosis:       Primary malignant neoplasm of left lung (CMS/HCC)      (Primary malignant neoplasm of left lung (CMS/HCC) [C34.92])    Surgeon:  Dougie Lewis MD Provider:  Wade Land MD    Anesthesia Type:  MAC ASA Status:  3          Anesthesia Type: MAC  Last vitals  BP   127/73 (12/02/19 1443)   Temp   97.5 °F (36.4 °C) (12/02/19 1443)   Pulse   78 (12/02/19 1443)   Resp   16 (12/02/19 1443)     SpO2   93 % (12/02/19 1443)     Post Anesthesia Care and Evaluation    Patient location during evaluation: PACU  Pain management: adequate  Airway patency: patent  Anesthetic complications: No anesthetic complications  PONV Status: none  Cardiovascular status: acceptable  Respiratory status: acceptable  Hydration status: acceptable

## 2019-12-02 NOTE — OP NOTE
REMOVAL VENOUS ACCESS DEVICE  Procedure Report    Patient Name:  Madalyn Navas  YOB: 1955    Date of Surgery:  12/2/2019     Indications: Patient seeking palliative care not chemotherapy.  Desires Mediport removal.    Pre-op Diagnosis:   Primary malignant neoplasm of left lung (CMS/HCC) [C34.92]       Post-Op Diagnosis Codes:     * Primary malignant neoplasm of left lung (CMS/HCC) [C34.92]    Procedure/CPT® Codes:      Procedure(s):  REMOVAL VENOUS ACCESS DEVICE    Staff:  Surgeon(s):  Dougie Lewis MD         Anesthesia: Monitored Anesthesia Care    Estimated Blood Loss: minimal    Implants:    Implant Name Type Inv. Item Serial No.  Lot No. LRB No. Used   venous access device     . Right 1       Specimen:          None        Findings: Mediport completely removed    Complications: None    Description of Procedure: The right subclavian area was prepped and draped in a standard sterile fashion.  Or antibiotics have been administered on time.  1% local lidocaine was infiltrated over the scar in the right prepectoral area.  The previous incision was reopened.  The mid port was dissected out the capsule was incised the mid port was removed.  The catheter tract was sutured closed with Vicryl suture.  The capsule was excised with cautery.  The wound was closed in multiple layers with absorbable suture.  Sterile dressings were applied.      Dougie Lewis MD     Date: 12/2/2019  Time: 1:23 PM

## 2019-12-02 NOTE — INTERVAL H&P NOTE
All systems reviewed.  NO positives other than above  H&P reviewed. The patient was examined and there are no changes to the H&P.

## 2019-12-02 NOTE — ANESTHESIA PREPROCEDURE EVALUATION
Anesthesia Evaluation     Patient summary reviewed and Nursing notes reviewed   NPO Solid Status: > 8 hours  NPO Liquid Status: > 8 hours           Airway   Mallampati: II  TM distance: >3 FB  Neck ROM: full  No difficulty expected  Dental - normal exam   (+) upper dentures    Pulmonary - normal exam   (+) lung cancer, COPD moderate,   Cardiovascular - normal exam  Exercise tolerance: unable to assess    ECG reviewed    (+) hyperlipidemia,       Neuro/Psych  (+) tremors, psychiatric history,     GI/Hepatic/Renal/Endo    (+)  GERD, PUD, GI bleeding , renal disease CRI,     Musculoskeletal     Abdominal  - normal exam   Substance History - negative use     OB/GYN negative ob/gyn ROS         Other   arthritis,    history of cancer remission                  Anesthesia Plan    ASA 3     MAC     intravenous induction     Anesthetic plan, all risks, benefits, and alternatives have been provided, discussed and informed consent has been obtained with: patient.

## 2019-12-06 NOTE — PROGRESS NOTES
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 She declined systemic therapy and has opted for hospice. She completed palliative radiation therapy to both sites 30Gy in 10 fractions on 11/1/19. Now with L leg pain affecting ambulation and a lucent area on L tibia concerning for metastasis.     RADIATION TREATMENT COURSE:      REASON FOR VISIT: Pain in leg and swelling.    HISTORY OF PRESENT ILLNESS: The patient is a 64 y.o. year old female who noted that she was having worsening swelling in her L leg. She recently had her port removed and around that same time noted that she had worsening with ambulation. Pain can get severe radiating from her L knee to her lower leg. It is somewhat improved with pain medication. She notes before she was able to move around without difficulty. Pain is worse with standing and walking. She also notes pain in her lower back that can sometimes catch her by surprise. She denies and strength/sensation changes in her legs, no urinary/bowel incontinence.    The following portions of the patient's history were reviewed and updated as appropriate: allergies, current medications, past family history, past medical history, past social history, past surgical history and problem list. Reviewed with the patient and remain unchanged.    PAST MEDICAL HISTORY:  she  has a past medical history of Abnormal mammogram, Acute bronchitis, Acute non-infective otitis externa of right ear, Anemia,  unspecified, Angular cheilitis, Anxiety, Bloody stool, Breast mass, left, Cancer of kidney (CMS/HCC), Cellulitis of right upper extremity, Cerebral ischemia, Cerebrovascular accident, old, Cervical cancer screening, Cervical disc disorder with myelopathy, Closed fracture of bone, COPD (chronic obstructive pulmonary disease) (CMS/HCC), Cough, Cramps, muscle, general, Degeneration of cervical intervertebral disc, Degenerative disc disease, lumbar, Diaphragmatic hernia, Edema, Extrapyramidal and movement disorders in diseases classified elsewhere, Fever presenting with conditions classified elsewhere, Fibromyalgia, Folliculitis, Fracture of right hip, closed, with nonunion, subsequent encounter, Fracture, intertrochanteric, right femur (CMS/HCC), Fusion of lumbar spine, Gastritis with hemorrhage, GERD (gastroesophageal reflux disease), H/O total hysterectomy with bilateral salpingo-oophorectomy (BSO), Hip pain, left, History of Nissen fundoplication, History of tobacco use, cholecystectomy, Hypokalemia, Initial Medicare annual wellness visit, Internal derangement of shoulder, right, Iron deficiency anemia, unspecified, Irritable bowel syndrome without diarrhea, Knee pain, Laryngeal cancer (CMS/HCC), Leg pain, Lumbar disc disorder with myelopathy, Lung cancer (CMS/HCC), Malaise and fatigue, Malignant neoplasm of thyroid gland (CMS/[facility]), Medicare annual wellness visit, subsequent, Menopausal syndrome, Mixed hyperlipidemia, Multiple pathological fractures, Neuralgia, Neuropathy, Occlusion of left carotid artery, Orthostatic hypotension, Osteoporosis, Other specified abdominal hernia without obstruction or gangrene, Other specified persistent mood disorders (CMS/[facility]), Overweight (BMI 25.0-29.9), Pain of both hip joints, Pelvic fracture (CMS/[facility]), Peptic ulceration, Polyarthralgia, Positive depression screening, S/P lumbar spine operation, Screening for alcoholism, Screening for osteoporosis, Seroma, post-traumatic  (CMS/HCC), Sinusitis, Thyroid cancer (CMS/HCC), Tobacco use, Tremor, Unsteady gait, UTI (urinary tract infection), and Visit for screening mammogram.  MEDICATIONS:   Current Outpatient Medications:   •  DULoxetine (CYMBALTA) 60 MG capsule, Take 1 capsule by mouth 2 (Two) Times a Day., Disp: 30 capsule, Rfl: 5  •  gabapentin (NEURONTIN) 300 MG capsule, Take 300 mg by mouth 4 (Four) Times a Day., Disp: , Rfl:   •  hydroCHLOROthiazide (HYDRODIURIL) 50 MG tablet, Take 1 tablet by mouth Daily., Disp: 30 tablet, Rfl: 5  •  linaclotide (LINZESS) 290 MCG capsule capsule, Take 1 capsule by mouth Daily., Disp: 30 capsule, Rfl: 5  •  methadone (DOLOPHINE) 10 MG tablet, Take 1 tablet by mouth Every 8 (Eight) Hours As Needed for Moderate Pain  or Severe Pain , (Patient taking differently: Take 10 mg by mouth Every 6 (Six) Hours As Needed , ), Disp: 90 tablet, Rfl: 0  •  oxyCODONE-acetaminophen (PERCOCET)  MG per tablet, Take 1 tablet by mouth 4 (Four) Times a Day As Needed for Moderate Pain . (Patient taking differently: Take 1.5 tablets by mouth 4 (Four) Times a Day As Needed for Moderate Pain .), Disp: 120 tablet, Rfl: 0  •  potassium chloride (K-DUR,KLOR-CON) 20 MEQ CR tablet, Take 20 mEq by mouth 2 (Two) Times a Day., Disp: , Rfl:   •  sertraline (ZOLOFT) 100 MG tablet, Take 2 tablets by mouth every night at bedtime., Disp: 60 tablet, Rfl: 12  •  albuterol sulfate HFA (PROAIR HFA) 108 (90 Base) MCG/ACT inhaler, Inhale 2 puffs Every 6 (Six) Hours As Needed for Wheezing., Disp: 8.5 g, Rfl: 5  ALLERGIES:   Allergies   Allergen Reactions   • Meperidine Swelling     demerol     PAST SURGICAL HISTORY: she has a past surgical history that includes Shoulder surgery (Right, 11/07/2017); Hip surgery (Right); Nephrectomy (07/09/2013); Total abdominal hysterectomy w/ bilateral salpingoophorectomy; Colonoscopy (N/A, 9/10/2019); Esophagogastroduodenoscopy (N/A, 9/10/2019); Back surgery; Hernia repair; Appendectomy; Venous Access  "Device (Port) (Right, 10/4/2019); Cervical Lymph Node Biopsy/Excision (Left, 10/4/2019); Bronchoscopy (N/A, 10/4/2019); Cholecystectomy; and Venous Access Device (Port) Removal (Right, 12/2/2019).  PREVIOUS RADIOTHERAPY OR CHEMOTHERAPY: Yes  FAMILY HISTORY: her family history includes Cancer in an other family member; Diabetes in an other family member; Lung cancer (age of onset: 62) in her sister; Lung cancer (age of onset: 75) in her mother; Throat cancer (age of onset: 76) in her father.  SOCIAL HISTORY: she  reports that she has been smoking electronic cigarette. She has smoked for the past 44.00 years. She has never used smokeless tobacco. She reports that she does not drink alcohol or use drugs.  PAIN AND PAIN MANAGEMENT:   Vitals:    12/06/19 1138   BP: 136/82   Pulse: 96   Temp: 97.6 °F (36.4 °C)   TempSrc: Oral   SpO2: 99%   Weight: 72.5 kg (159 lb 12.8 oz)   Height: 160 cm (63\")   PainSc:   8   PainLoc: Leg  Comment: left from knee down     NUTRITIONAL STATUS:    Otherwise no issues  KPS: 70:  Cares for self; unable to carry on nomal activity  ECOG: (3) Capable of limited self-care, confined to bed or chair > 50% of waking hours       Review of Systems:   Review of Systems   Constitutional: Negative.  Negative for activity change, appetite change, chills, diaphoresis, fatigue, fever and unexpected weight change.   HENT: Negative for congestion, dental problem, drooling, ear discharge, ear pain, facial swelling, hearing loss, mouth sores, nosebleeds, postnasal drip, rhinorrhea, sinus pressure, sinus pain, sneezing, sore throat, tinnitus, trouble swallowing and voice change.    Eyes: Negative.  Negative for photophobia, pain, discharge, redness, itching and visual disturbance.   Respiratory: Negative.  Negative for apnea, cough, choking, chest tightness, shortness of breath, wheezing and stridor.    Cardiovascular: Negative.  Negative for chest pain, palpitations and leg swelling.   Gastrointestinal: " "Negative.  Negative for abdominal distention, abdominal pain, anal bleeding, blood in stool, constipation, diarrhea, nausea, rectal pain and vomiting.   Endocrine: Negative.  Negative for cold intolerance, heat intolerance, polydipsia, polyphagia and polyuria.   Genitourinary: Negative for difficulty urinating, dysuria, enuresis, flank pain, frequency and hematuria.   Musculoskeletal: Positive for arthralgias, back pain, joint swelling and myalgias. Negative for gait problem, neck pain and neck stiffness.        Pain in left leg and back   Skin: Negative.  Negative for color change, pallor, rash and wound.   Allergic/Immunologic: Negative.  Negative for environmental allergies, food allergies and immunocompromised state.   Neurological: Negative.  Negative for dizziness, tremors, seizures, syncope, facial asymmetry, speech difficulty, weakness, light-headedness, numbness and headaches.   Hematological: Negative.  Negative for adenopathy. Does not bruise/bleed easily.   Psychiatric/Behavioral: Negative.  Negative for agitation, behavioral problems, confusion, decreased concentration, dysphoric mood, hallucinations, self-injury, sleep disturbance and suicidal ideas. The patient is not nervous/anxious and is not hyperactive.      Objective     Vitals:  Vitals:    12/06/19 1138   BP: 136/82   Pulse: 96   Temp: 97.6 °F (36.4 °C)   TempSrc: Oral   SpO2: 99%   Weight: 72.5 kg (159 lb 12.8 oz)   Height: 160 cm (63\")   PainSc:   8   PainLoc: Leg  Comment: left from knee down       PHYSICAL EXAM:  GENERAL: in no apparent distress, sitting comfortably in room.    HEENT: normocephalic, atraumatic.  NECK: Supple with no masses.  CARDIOVASCULAR: S1 & S2 detected; no murmurs, rubs or gallops.  CHEST: clear to auscultation bilaterally; no wheezes, crackles or rubs. Work of breathing normal.  ABDOMEN: bowel sounds present. Abdomen is soft, nontender, nondistended.   MUSCULOSKELETAL: no tenderness to palpation along the spine or " scapulae.   EXTREMITIES: Bilateral lower extremity edema L>R  SKIN: no erythema, rashes, ulcerations noted.   NEUROLOGIC: . No focal neurologic deficits.  PSYCHIATRIC:  alert, aware, and appropriate.      PERTINENT IMAGING/PATHOLOGY/LABS (Medical Decision Making):     COORDINATION OF CARE: A copy of this note is sent to the referring provider.    PATHOLOGY (Reviewed):   No new pathology    IMAGING (Reviewed):   DATE OF EXAM:  12/2/2019 3:33 PM     PROCEDURE:  XR TIBIA FIBULA 2 VW LEFT-     INDICATIONS:  SEVERE PAIN WITH AMBULATION; R52-Pain, unspecified, pain and swelling  from knee to ankle with ambulation for one to 2 weeks. History of lung  cancer and bone cancer and liver cancer.     COMPARISON:  No Comparisons Available     TECHNIQUE:   Two radiographic views of the left tibia and fibula were obtained.     FINDINGS:  No acute fracture or dislocation. Along the posterior aspect of the  proximal tibial shaft there is a fairly subtle lucency measuring 3 cm in  maximum craniocaudal dimension which on the lateral view demonstrates  mild cortical thickening and periosteal reaction. In the mid tibial  shaft there is a questionable very subtle lucent area measuring  approximately 8 mm. No pathologic fracture. Findings are concerning for  osseous metastasis given his history. Multiple myeloma could have a  similar appearance in the appropriate clinical setting. A focal soft  tissue abnormality is not demonstrated. There is mild medial  patellofemoral joint space narrowing with patellar and tibial spine  spurring      IMPRESSION:     1. Lucent lesion with periosteal reaction in the proximal tibial shaft  posteriorly concerning for osseous metastasis or multiple myeloma.  Metastatic disease is favored given his clinical history. No pathologic  fracture. Questionable subtle subcentimeter lucent areas noted in the  mid tibial shaft. Whole body bone scan would be better able to evaluate  for potential osseous metastases if  it has not been performed elsewhere.  There are no prior imaging studies of this area at this institution.  2. Mild degenerative change in the knee.         LABS (Reviewed):  Hematology WBC   Date Value Ref Range Status   11/29/2019 8.40 3.40 - 10.80 10*3/mm3 Final   09/16/2019 13.7 (H) 3.4 - 10.8 x10E3/uL Final     RBC   Date Value Ref Range Status   11/29/2019 4.78 3.77 - 5.28 10*6/mm3 Final   09/16/2019 3.06 (L) 3.77 - 5.28 x10E6/uL Final     Hemoglobin   Date Value Ref Range Status   11/29/2019 11.2 (L) 12.0 - 15.9 g/dL Final     Hematocrit   Date Value Ref Range Status   11/29/2019 34.8 34.0 - 46.6 % Final     Platelets   Date Value Ref Range Status   11/29/2019 281 140 - 450 10*3/mm3 Final      Chemistry   Lab Results   Component Value Date    GLUCOSE 83 11/29/2019    BUN 9 11/29/2019    CREATININE 0.91 11/29/2019    EGFRIFNONA 63 11/29/2019    EGFRIFAFRI 55 (L) 09/16/2019    BCR 9.9 11/29/2019    K 3.8 11/29/2019    CO2 28.0 11/29/2019    CALCIUM 9.7 11/29/2019    PROTENTOTREF 6.7 08/13/2019    ALBUMIN 3.60 10/04/2019    LABIL2 1.1 (L) 08/13/2019    AST 19 10/04/2019    ALT 12 (L) 10/04/2019         Assessment/Plan     ASSESSMENT AND PLAN:   65 yo F with 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 She declined systemic therapy and has opted for hospice. She completed palliative radiation therapy to both sites 30Gy in 10 fractions on 11/1/19. She now presents with worsening leg swelling and leg pain.  · Given history of recent procedure with immobilization, malignancy, and worsening edema L>R, I am concerned that she may have a DVT. I recommend a doppler.  Addendum will be ordered by hospice.   · For back pain and edema bilaterally I recommend a CT abdomen and pelvis to evaluate for bone lesions in spine as well as to  evaluate adenopathy in pelvis as a cause of bilateral lower extremity edema. Addendum: This was not recommended per hospice MD. Will request if thoracic spine and lumbar x-rays can be performed.   · Given that her leg pain is consistent with lucent area concerning for metastasis on recent x-ray and her ambulation has decreased as a result of the pain, I recommend palliative external beam radiation therapy to the tibial lesion 20Gy in 5 fractions. I discussed benefits and risks of therapy and patient would like to proceed with treatment.   · She will be out of town and will be back the week of Dec 15th. She would like any tests to be done after she returns.     1. Primary malignant neoplasm of left lung (CMS/HCC)         This assessment comes from my review of the imaging, pathology, physician notes and other pertinent information as mentioned.      TIME SPENT WITH PATIENT:   I spent greater than 50 minutes in face-to-face time with the patient and 45 minutes of that time were spent in counseling and coordination of care, including review of imaging and pathology; indications, goals, logistics, alternatives and risks - both common and rare - for my recommendations as well as surveillance and potential outcomes.       Smith Christensen MD  12/6/2019  4:14 PM

## 2019-12-10 NOTE — TELEPHONE ENCOUNTER
Left message for Chiara the hospice nurse to call me back to discuss whether or not dr Tang needs to order the tests or if the hospice MD needs to order the tests.    Chiara number is [phone].

## 2019-12-10 NOTE — TELEPHONE ENCOUNTER
Spoke with Chiara the Hospice nurse and she states that the Hospice MD agrees with the doppler of the left leg to check for a blood clot and the 5 treatments of  Radiation to the bone mets in the leg.  Chiara says the Hospice MD will order the doppler.  As far as the CT of abdomen and Chest he did not agree with.  The Hospice MD will not order the CT of the abdomen and pelvis.  Patient is in [place] right now and will return on Friday.  Chiara will then touch base with me on Friday or Monday.                                               Bea Grayson RN

## 2019-12-12 PROBLEM — I10 ESSENTIAL HYPERTENSION: Status: ACTIVE | Noted: 2019-01-01

## 2019-12-12 PROBLEM — E78.2 MIXED HYPERLIPIDEMIA: Status: ACTIVE | Noted: 2019-01-01

## 2019-12-18 NOTE — TELEPHONE ENCOUNTER
Spoke with patient and confirmed the appt for her CT/SIM at 1130 on Monday 12-.  Patient verbalized understanding.

## 2020-01-01 ENCOUNTER — HOSPITAL ENCOUNTER (OUTPATIENT)
Dept: RADIATION ONCOLOGY | Facility: HOSPITAL | Age: 65
Discharge: HOME | End: 2020-03-02

## 2020-01-01 ENCOUNTER — HOSPITAL ENCOUNTER (OUTPATIENT)
Dept: RADIATION ONCOLOGY | Facility: HOSPITAL | Age: 65
Discharge: HOME | End: 2020-03-03

## 2020-01-01 ENCOUNTER — RECURRING PLAN (OUTPATIENT)
Dept: ONCOLOGY | Facility: HOSPITAL | Age: 65
End: 2020-01-01

## 2020-01-01 ENCOUNTER — OFFICE VISIT (OUTPATIENT)
Dept: RADIATION ONCOLOGY | Facility: HOSPITAL | Age: 65
End: 2020-01-01

## 2020-01-01 ENCOUNTER — HOSPITAL ENCOUNTER (OUTPATIENT)
Dept: RADIATION ONCOLOGY | Facility: HOSPITAL | Age: 65
Setting detail: RADIATION/ONCOLOGY SERIES
End: 2020-01-01

## 2020-01-01 ENCOUNTER — TELEPHONE (OUTPATIENT)
Dept: CARDIOLOGY | Facility: CLINIC | Age: 65
End: 2020-01-01

## 2020-01-01 ENCOUNTER — HOSPITAL ENCOUNTER (OUTPATIENT)
Dept: RADIATION ONCOLOGY | Facility: HOSPITAL | Age: 65
Discharge: HOME | End: 2020-02-27

## 2020-01-01 ENCOUNTER — HOSPITAL ENCOUNTER (OUTPATIENT)
Dept: RADIATION ONCOLOGY | Facility: HOSPITAL | Age: 65
Discharge: HOME | End: 2020-02-19

## 2020-01-01 ENCOUNTER — TELEPHONE (OUTPATIENT)
Dept: RADIATION ONCOLOGY | Facility: HOSPITAL | Age: 65
End: 2020-01-01

## 2020-01-01 ENCOUNTER — HOSPITAL ENCOUNTER (OUTPATIENT)
Dept: RADIATION ONCOLOGY | Facility: HOSPITAL | Age: 65
Discharge: HOME | End: 2020-02-26

## 2020-01-01 ENCOUNTER — HOSPITAL ENCOUNTER (OUTPATIENT)
Dept: PET IMAGING | Facility: HOSPITAL | Age: 65
Discharge: HOME | End: 2020-01-30
Admitting: RADIOLOGY

## 2020-01-01 ENCOUNTER — HOSPITAL ENCOUNTER (OUTPATIENT)
Dept: RADIATION ONCOLOGY | Facility: HOSPITAL | Age: 65
Discharge: HOME | End: 2020-02-24

## 2020-01-01 ENCOUNTER — TELEPHONE (OUTPATIENT)
Dept: ONCOLOGY | Facility: CLINIC | Age: 65
End: 2020-01-01

## 2020-01-01 ENCOUNTER — HOSPITAL ENCOUNTER (OUTPATIENT)
Dept: RADIATION ONCOLOGY | Facility: HOSPITAL | Age: 65
Discharge: HOME | End: 2020-02-25

## 2020-01-01 ENCOUNTER — HOSPITAL ENCOUNTER (OUTPATIENT)
Dept: RADIATION ONCOLOGY | Facility: HOSPITAL | Age: 65
Discharge: HOME | End: 2020-02-28

## 2020-01-01 ENCOUNTER — HOSPITAL ENCOUNTER (OUTPATIENT)
Dept: RADIATION ONCOLOGY | Facility: HOSPITAL | Age: 65
Discharge: HOME | End: 2020-02-21

## 2020-01-01 ENCOUNTER — HOSPITAL ENCOUNTER (OUTPATIENT)
Dept: RADIATION ONCOLOGY | Facility: HOSPITAL | Age: 65
Discharge: HOME | End: 2020-02-17

## 2020-01-01 ENCOUNTER — HOSPITAL ENCOUNTER (OUTPATIENT)
Dept: RADIATION ONCOLOGY | Facility: HOSPITAL | Age: 65
Discharge: HOME | End: 2020-02-20

## 2020-01-01 VITALS
SYSTOLIC BLOOD PRESSURE: 90 MMHG | HEART RATE: 89 BPM | TEMPERATURE: 98 F | DIASTOLIC BLOOD PRESSURE: 59 MMHG | WEIGHT: 150.4 LBS | BODY MASS INDEX: 26.65 KG/M2 | OXYGEN SATURATION: 95 % | HEIGHT: 63 IN

## 2020-01-01 DIAGNOSIS — C34.92 PRIMARY MALIGNANT NEOPLASM OF LEFT LUNG (HCC): Primary | ICD-10-CM

## 2020-01-01 DIAGNOSIS — C34.92 PRIMARY MALIGNANT NEOPLASM OF LEFT LUNG (HCC): ICD-10-CM

## 2020-01-01 LAB
BH CV STRESS COMMENTS STAGE 1: NORMAL
BH CV STRESS DOSE REGADENOSON STAGE 1: 0.4
BH CV STRESS DURATION MIN STAGE 1: 0
BH CV STRESS DURATION SEC STAGE 1: 10
BH CV STRESS PROTOCOL 1: NORMAL
BH CV STRESS RECOVERY BP: NORMAL MMHG
BH CV STRESS RECOVERY HR: 94 BPM
BH CV STRESS STAGE 1: 1
CREAT BLDA-MCNC: 0.9 MG/DL (ref 0.6–1.3)
LV EF NUC BP: 74 %
MAXIMAL PREDICTED HEART RATE: 159 BPM
STRESS BASELINE BP: NORMAL MMHG
STRESS BASELINE HR: 74 BPM
STRESS TARGET HR: 135 BPM

## 2020-01-01 PROCEDURE — 77334 RADIATION TREATMENT AID(S): CPT | Performed by: RADIOLOGY

## 2020-01-01 PROCEDURE — G0463 HOSPITAL OUTPT CLINIC VISIT: HCPCS | Performed by: RADIOLOGY

## 2020-01-01 PROCEDURE — 77412 RADIATION TX DELIVERY LVL 3: CPT | Performed by: RADIOLOGY

## 2020-01-01 PROCEDURE — 77387 GUIDANCE FOR RADJ TX DLVR: CPT | Performed by: RADIOLOGY

## 2020-01-01 PROCEDURE — 77336 RADIATION PHYSICS CONSULT: CPT | Performed by: RADIOLOGY

## 2020-01-01 PROCEDURE — 77295 3-D RADIOTHERAPY PLAN: CPT | Performed by: RADIOLOGY

## 2020-01-01 PROCEDURE — 71260 CT THORAX DX C+: CPT

## 2020-01-01 PROCEDURE — 77280 THER RAD SIMULAJ FIELD SMPL: CPT | Performed by: RADIOLOGY

## 2020-01-01 PROCEDURE — 77290 THER RAD SIMULAJ FIELD CPLX: CPT | Performed by: RADIOLOGY

## 2020-01-01 PROCEDURE — 78452 HT MUSCLE IMAGE SPECT MULT: CPT | Performed by: INTERNAL MEDICINE

## 2020-01-01 PROCEDURE — 82565 ASSAY OF CREATININE: CPT

## 2020-01-01 PROCEDURE — 77300 RADIATION THERAPY DOSE PLAN: CPT | Performed by: RADIOLOGY

## 2020-01-01 PROCEDURE — 93018 CV STRESS TEST I&R ONLY: CPT | Performed by: INTERNAL MEDICINE

## 2020-01-01 PROCEDURE — 0 IOPAMIDOL PER 1 ML: Performed by: RADIOLOGY

## 2020-01-01 RX ADMIN — IOPAMIDOL 100 ML: 755 INJECTION, SOLUTION INTRAVENOUS at 12:20

## 2020-01-24 NOTE — PROGRESS NOTES
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 She declined systemic therapy and has opted for hospice. She completed palliative radiation therapy to both sites 30Gy in 10 fractions on 11/1/19. She then presented with L leg pain affecting ambulation and a lucent area on L tibia concerning for metastasis for which she completed palliative radiation therapy 20Gy in 5 fractions on 12/30/19.    1. Primary malignant neoplasm of left lung (CMS/HCC)        REASON FOR VISIT: Routine scheduled follow-up     RADIATION TREATMENT COURSE:        HISTORY OF PRESENT ILLNESS:   She notes after her treatment to the leg she has had an improvement in pain to the point she has been able to move around much more. She also notes pain in her R clavicle and L side of her chest. She notes pain can get severe to the point where she has to stop and catch her breath. She denies any changes in strength, sensation, or difficulties breathing.     The following portions of the patient's history were reviewed and updated as appropriate: allergies, current medications, past family history, past medical history, past social history, past surgical history and problem list. Reviewed with the patient and remain unchanged.    PAST MEDICAL HISTORY:  she  has a past medical history of Abnormal mammogram, Acute bronchitis, Acute non-infective otitis externa of right ear, Anemia, unspecified, Angular cheilitis, Anxiety, Bloody stool, Breast mass, left, Cancer of kidney (CMS/HCC), Cellulitis of right upper extremity, Cerebral ischemia, Cerebrovascular accident, old, Cervical cancer  screening, Cervical disc disorder with myelopathy, Closed fracture of bone, COPD (chronic obstructive pulmonary disease) (CMS/HCC), Cough, Cramps, muscle, general, Degeneration of cervical intervertebral disc, Degenerative disc disease, lumbar, Diaphragmatic hernia, Edema, Extrapyramidal and movement disorders in diseases classified elsewhere, Fever presenting with conditions classified elsewhere, Fibromyalgia, Folliculitis, Fracture of right hip, closed, with nonunion, subsequent encounter, Fracture, intertrochanteric, right femur (CMS/HCC), Fusion of lumbar spine, Gastritis with hemorrhage, GERD (gastroesophageal reflux disease), H/O total hysterectomy with bilateral salpingo-oophorectomy (BSO), Hip pain, left, History of Nissen fundoplication, History of tobacco use, cholecystectomy, Hypokalemia, Initial Medicare annual wellness visit, Internal derangement of shoulder, right, Iron deficiency anemia, unspecified, Irritable bowel syndrome without diarrhea, Knee pain, Laryngeal cancer (CMS/HCC), Leg pain, Lumbar disc disorder with myelopathy, Lung cancer (CMS/HCC), Malaise and fatigue, Malignant neoplasm of thyroid gland (CMS/HCC), Medicare annual wellness visit, subsequent, Menopausal syndrome, Mixed hyperlipidemia, Multiple pathological fractures, Neuralgia, Neuropathy, Occlusion of left carotid artery, Orthostatic hypotension, Osteoporosis, Other specified abdominal hernia without obstruction or gangrene, Other specified persistent mood disorders (CMS/HCC), Overweight (BMI 25.0-29.9), Pain of both hip joints, Pelvic fracture (CMS/HCC), Peptic ulceration, Polyarthralgia, Positive depression screening, S/P lumbar spine operation, Screening for alcoholism, Screening for osteoporosis, Seroma, post-traumatic (CMS/HCC), Sinusitis, Thyroid cancer (CMS/HCC), Tobacco use, Tremor, Unsteady gait, UTI (urinary tract infection), and Visit for screening mammogram.  MEDICATIONS:   Current Outpatient Medications:   •  albuterol  sulfate HFA (PROAIR HFA) 108 (90 Base) MCG/ACT inhaler, Inhale 2 puffs Every 6 (Six) Hours As Needed for Wheezing., Disp: 8.5 g, Rfl: 5  •  DULoxetine (CYMBALTA) 60 MG capsule, Take 1 capsule by mouth 2 (Two) Times a Day., Disp: 30 capsule, Rfl: 5  •  gabapentin (NEURONTIN) 300 MG capsule, Take 300 mg by mouth 4 (Four) Times a Day., Disp: , Rfl:   •  hydroCHLOROthiazide (HYDRODIURIL) 50 MG tablet, Take 1 tablet by mouth Daily., Disp: 30 tablet, Rfl: 5  •  linaclotide (LINZESS) 290 MCG capsule capsule, Take 1 capsule by mouth Daily., Disp: 30 capsule, Rfl: 5  •  methadone (DOLOPHINE) 10 MG tablet, Take 1 tablet by mouth Every 8 (Eight) Hours As Needed for Moderate Pain  or Severe Pain , (Patient taking differently: Take 10 mg by mouth Every 6 (Six) Hours As Needed , ), Disp: 90 tablet, Rfl: 0  •  oxyCODONE-acetaminophen (PERCOCET)  MG per tablet, Take 1 tablet by mouth 4 (Four) Times a Day As Needed for Moderate Pain . (Patient taking differently: Take 1.5 tablets by mouth 4 (Four) Times a Day As Needed for Moderate Pain .), Disp: 120 tablet, Rfl: 0  •  potassium chloride (K-DUR,KLOR-CON) 20 MEQ CR tablet, Take 20 mEq by mouth 2 (Two) Times a Day., Disp: , Rfl:   •  sertraline (ZOLOFT) 100 MG tablet, Take 2 tablets by mouth every night at bedtime., Disp: 60 tablet, Rfl: 12  ALLERGIES:   Allergies   Allergen Reactions   • Meperidine Swelling     demerol     PAST SURGICAL HISTORY: she has a past surgical history that includes Shoulder surgery (Right, 11/07/2017); Hip surgery (Right); Nephrectomy (07/09/2013); Total abdominal hysterectomy w/ bilateral salpingoophorectomy; Colonoscopy (N/A, 9/10/2019); Esophagogastroduodenoscopy (N/A, 9/10/2019); Back surgery; Hernia repair; Appendectomy; Venous Access Device (Port) (Right, 10/4/2019); Cervical Lymph Node Biopsy/Excision (Left, 10/4/2019); Bronchoscopy (N/A, 10/4/2019); Cholecystectomy; Venous Access Device (Port) Removal (Right, 12/2/2019); Echo - Converted  "(2019); and Cardiovascular stress test (2019).  PREVIOUS RADIOTHERAPY OR CHEMOTHERAPY: Yes  FAMILY HISTORY: her family history includes Cancer in an other family member; Diabetes in an other family member; Lung cancer (age of onset: 62) in her sister; Lung cancer (age of onset: 75) in her mother; Throat cancer (age of onset: 76) in her father.  SOCIAL HISTORY: she  reports that she has been smoking electronic cigarette. She has smoked for the past 44.00 years. She has never used smokeless tobacco. She reports that she does not drink alcohol or use drugs.  PAIN AND PAIN MANAGEMENT:   Vitals:    01/24/20 1038   BP: 90/59   Pulse: 89   Temp: 98 °F (36.7 °C)   TempSrc: Oral   SpO2: 95%   Weight: 68.2 kg (150 lb 6.4 oz)   Height: 160 cm (63\")   PainSc:   7   PainLoc: Shoulder  Comment: right     NUTRITIONAL STATUS:    Otherwise no issues  KPS: 80:  Normal activity with effort; some signs or symptoms  ECOG: (2) Ambulatory and capable of self care, unable to carry out work activity, up and about > 50% or waking hours       Review of Systems:   Review of Systems   Constitutional: Positive for fatigue.   HENT: Positive for rhinorrhea, sinus pressure and sinus pain.    Eyes: Negative.    Respiratory: Negative.    Cardiovascular: Negative.    Gastrointestinal: Negative.    Endocrine: Negative.    Genitourinary: Negative.    Musculoskeletal: Negative.    Skin: Negative.    Allergic/Immunologic: Negative.    Neurological: Positive for headaches.   Hematological: Negative.    Psychiatric/Behavioral: Negative.    Patient complaining of a knot on the right shoulder and pain on the left side pain.  Knot on the shoulder has been there for 3 weeks.       Otherwise negative as below.     General: No fevers, chills, weight change, or drenching night sweats. Skin: No rashes or jaundice.  HEENT: No change in vision or hearing, no headaches.  Neck: No dysphagia or masses.  Heme/Lymph: No easy bruising or bleeding.  Respiratory System: No " "shortness of breath or cough.  Cardiovascular: No chest pain, palpitations, or dyspnea on exertion.  +/- Pacemaker. GI: No nausea, vomiting, diarrhea, melena, or hematochezia.  : No dysuria or hematuria.  Endocrine: No heat or cold intolerance. Musculoskeletal: No myalgias or arthralgias.  Neuro: No weakness, numbness, syncope, or seizures. Psych: No mood changes or depression. Ext: Denies swelling.        Objective     Vitals:  Vitals:    01/24/20 1038   BP: 90/59   Pulse: 89   Temp: 98 °F (36.7 °C)   TempSrc: Oral   SpO2: 95%   Weight: 68.2 kg (150 lb 6.4 oz)   Height: 160 cm (63\")   PainSc:   7   PainLoc: Shoulder  Comment: right       PHYSICAL EXAM:  GENERAL: in no apparent distress, sitting comfortably in room.    HEENT: normocephalic, atraumatic.  NECK: Supple with no masses.  LYMPHATIC: no cervical, supraclavicular  adenopathy appreciated bilaterally.   CARDIOVASCULAR: S1 & S2 detected; no murmurs, rubs or gallops.  CHEST: clear to auscultation bilaterally; no wheezes, crackles or rubs. Work of breathing normal.  ABDOMEN:  Abdomen is , nondistended.   MUSCULOSKELETAL: some tenderness to percussion over L chest wall and R clavicle  EXTREMITIES: no clubbing, cyanosis, edema.  SKIN: no erythema, rashes, ulcerations noted.   NEUROLOGIC:No focal neurologic deficits.  PSYCHIATRIC:  alert, aware, and appropriate.      PERTINENT IMAGING/PATHOLOGY/LABS (Medical Decision Making):     COORDINATION OF CARE: A copy of this note is sent to the referring provider.    PATHOLOGY (Reviewed): no new path    IMAGING (Reviewed): no new imaging    LABS (Reviewed):  Hematology WBC   Date Value Ref Range Status   11/29/2019 8.40 3.40 - 10.80 10*3/mm3 Final   09/16/2019 13.7 (H) 3.4 - 10.8 x10E3/uL Final     RBC   Date Value Ref Range Status   11/29/2019 4.78 3.77 - 5.28 10*6/mm3 Final   09/16/2019 3.06 (L) 3.77 - 5.28 x10E6/uL Final     Hemoglobin   Date Value Ref Range Status   11/29/2019 11.2 (L) 12.0 - 15.9 g/dL Final "     Hematocrit   Date Value Ref Range Status   11/29/2019 34.8 34.0 - 46.6 % Final     Platelets   Date Value Ref Range Status   11/29/2019 281 140 - 450 10*3/mm3 Final      Chemistry   Lab Results   Component Value Date    GLUCOSE 83 11/29/2019    BUN 9 11/29/2019    CREATININE 0.91 11/29/2019    EGFRIFNONA 63 11/29/2019    EGFRIFAFRI 55 (L) 09/16/2019    BCR 9.9 11/29/2019    K 3.8 11/29/2019    CO2 28.0 11/29/2019    CALCIUM 9.7 11/29/2019    PROTENTOTREF 6.7 08/13/2019    ALBUMIN 3.60 10/04/2019    LABIL2 1.1 (L) 08/13/2019    AST 19 10/04/2019    ALT 12 (L) 10/04/2019         Assessment/Plan     ASSESSMENT AND PLAN:  · 63 yo F with 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 She declined systemic therapy and has opted for hospice. She completed palliative radiation therapy to both sites 30Gy in 10 fractions on 11/1/19. She then presented with L leg pain affecting ambulation and a lucent area on L tibia concerning for metastasis for which she completed palliative radiation therapy 20Gy in 5 fractions on 12/30/19. Now with pain over her R clavicle and L chest wall.  · I suspect metastatic disease. Given that after prior treatments she had an improvement in pain and continues to be active, I recommend evaluation with a CT chest to better evaluate these areas. If CT shows signs of metastatic disease in these areas then she would likely benefit from palliative RT 30Gy in 10 fractions.     1. Primary malignant neoplasm of left lung (CMS/HCC)         This assessment comes from my review of the imaging, pathology, physician notes and other pertinent information as mentioned.      TIME SPENT WITH PATIENT:   I spent greater than 25 minutes in face-to-face time with the patient and 20 minutes of that time were spent in counseling and  coordination of care, including review of imaging and pathology; indications, goals, logistics, alternatives and risks - both common and rare - for my recommendations as well as surveillance and potential outcomes.    Smith Christensen MD  1/24/2020  10:41 AM

## 2020-01-24 NOTE — TELEPHONE ENCOUNTER
I called Chiara the Hospice nurse and requested a CT of the chest.  If not a CT, could they order a AP and Lateral chest x ray on the left side and a x ray of the right clavicle.  Patient has a nodule on the right clavicle and is having pain in the left side near the ribcage suspicious for more cancer.   Chiara said she would get back to me.

## 2020-01-27 NOTE — TELEPHONE ENCOUNTER
Chiara notified me today that it is ok to order CT of chest.  Patient is taking a high dose of dilaudid and they would like her to come down some on it.    I notified Dr Tang.

## 2020-02-04 NOTE — TELEPHONE ENCOUNTER
Cristina Ferreira is requesting patients results from her CT scan of the chest done within the last week.  Please fax to [phone].

## 2020-02-05 NOTE — TELEPHONE ENCOUNTER
I faxed the results.  I also notified Chiara at [facility] that I faxed the results.

## 2022-04-12 NOTE — ANESTHESIA POSTPROCEDURE EVALUATION
Patient: Madalyn Navas    Procedure Summary     Date:  09/10/19 Room / Location:  [facility] ENDOSCOPY 1 / [facility]COPY    Anesthesia Start:  1229 Anesthesia Stop:  1257    Procedures:       COLONOSCOPY (N/A )      ESOPHAGOGASTRODUODENOSCOPY with biopsy X1 (N/A ) Diagnosis:       Acute GI bleeding      Anemia, unspecified type      (Acute GI bleeding [K92.2])      (Anemia, unspecified type [D64.9])    Surgeon:  Nain Rosales MD Provider:  Srinath Fuller MD    Anesthesia Type:  MAC ASA Status:  3          Anesthesia Type: MAC  Last vitals  BP   124/64 (09/10/19 1320)   Temp   98 °F (36.7 °C) (09/10/19 1143)   Pulse   71 (09/10/19 1320)   Resp   11 (09/10/19 1320)     SpO2   99 % (09/10/19 1320)     Post Anesthesia Care and Evaluation    Patient location during evaluation: PACU  Patient participation: complete - patient participated  Level of consciousness: awake  Pain scale: See nurse's notes for pain score.  Pain management: adequate  Airway patency: patent  Anesthetic complications: No anesthetic complications  PONV Status: none  Cardiovascular status: acceptable  Respiratory status: acceptable  Hydration status: acceptable    Comments: Patient seen and examined postoperatively; vital signs stable; SpO2 greater than or equal to 90%; cardiopulmonary status stable; nausea/vomiting adequately controlled; pain adequately controlled; no apparent anesthesia complications; patient discharged from anesthesia care when discharge criteria were met      
no
